# Patient Record
Sex: MALE | Race: WHITE | NOT HISPANIC OR LATINO | Employment: OTHER | URBAN - METROPOLITAN AREA
[De-identification: names, ages, dates, MRNs, and addresses within clinical notes are randomized per-mention and may not be internally consistent; named-entity substitution may affect disease eponyms.]

---

## 2017-08-16 ENCOUNTER — TRANSCRIBE ORDERS (OUTPATIENT)
Dept: ADMINISTRATIVE | Facility: HOSPITAL | Age: 67
End: 2017-08-16

## 2017-08-16 ENCOUNTER — HOSPITAL ENCOUNTER (OUTPATIENT)
Dept: RADIOLOGY | Facility: HOSPITAL | Age: 67
Discharge: HOME | End: 2017-08-16
Payer: MEDICARE

## 2017-08-16 DIAGNOSIS — M54.50 CHRONIC LOW BACK PAIN WITHOUT SCIATICA, UNSPECIFIED BACK PAIN LATERALITY: ICD-10-CM

## 2017-08-16 DIAGNOSIS — G89.29 CHRONIC LOW BACK PAIN WITHOUT SCIATICA, UNSPECIFIED BACK PAIN LATERALITY: Primary | ICD-10-CM

## 2017-08-16 DIAGNOSIS — M54.50 CHRONIC LOW BACK PAIN WITHOUT SCIATICA, UNSPECIFIED BACK PAIN LATERALITY: Primary | ICD-10-CM

## 2017-08-16 DIAGNOSIS — G89.29 CHRONIC LOW BACK PAIN WITHOUT SCIATICA, UNSPECIFIED BACK PAIN LATERALITY: ICD-10-CM

## 2017-08-16 PROCEDURE — 72110 X-RAY EXAM L-2 SPINE 4/>VWS: CPT

## 2018-10-17 ENCOUNTER — HOSPITAL ENCOUNTER (OUTPATIENT)
Facility: HOSPITAL | Age: 68
Setting detail: OBSERVATION
LOS: 1 days | Discharge: HOME | End: 2018-10-19
Attending: EMERGENCY MEDICINE | Admitting: INTERNAL MEDICINE
Payer: MEDICARE

## 2018-10-17 ENCOUNTER — APPOINTMENT (EMERGENCY)
Dept: RADIOLOGY | Facility: HOSPITAL | Age: 68
End: 2018-10-17
Payer: MEDICARE

## 2018-10-17 DIAGNOSIS — I48.92 ATRIAL FLUTTER (HCC): Primary | ICD-10-CM

## 2018-10-17 DIAGNOSIS — I48.3 TYPICAL ATRIAL FLUTTER (HCC): ICD-10-CM

## 2018-10-17 PROBLEM — I16.0 HYPERTENSIVE URGENCY: Status: ACTIVE | Noted: 2018-10-17

## 2018-10-17 LAB
ALBUMIN SERPL BCP-MCNC: 4.5 G/DL (ref 3.5–5)
ALP SERPL-CCNC: 77 U/L (ref 46–116)
ALT SERPL W P-5'-P-CCNC: 36 U/L (ref 12–78)
ANION GAP SERPL CALCULATED.3IONS-SCNC: 13 MMOL/L (ref 4–13)
APTT PPP: 29 SECONDS (ref 24–36)
AST SERPL W P-5'-P-CCNC: 20 U/L (ref 5–45)
BASOPHILS # BLD AUTO: 0.06 THOUSANDS/ΜL (ref 0–0.1)
BASOPHILS NFR BLD AUTO: 1 % (ref 0–1)
BILIRUB SERPL-MCNC: 0.6 MG/DL (ref 0.2–1)
BUN SERPL-MCNC: 20 MG/DL (ref 5–25)
CALCIUM SERPL-MCNC: 9.6 MG/DL (ref 8.3–10.1)
CHLORIDE SERPL-SCNC: 103 MMOL/L (ref 100–108)
CO2 SERPL-SCNC: 25 MMOL/L (ref 21–32)
CREAT SERPL-MCNC: 1.06 MG/DL (ref 0.6–1.3)
EOSINOPHIL # BLD AUTO: 0.28 THOUSAND/ΜL (ref 0–0.61)
EOSINOPHIL NFR BLD AUTO: 4 % (ref 0–6)
ERYTHROCYTE [DISTWIDTH] IN BLOOD BY AUTOMATED COUNT: 12.1 % (ref 11.6–15.1)
GFR SERPL CREATININE-BSD FRML MDRD: 72 ML/MIN/1.73SQ M
GLUCOSE SERPL-MCNC: 110 MG/DL (ref 65–140)
HCT VFR BLD AUTO: 51.6 % (ref 36.5–49.3)
HGB BLD-MCNC: 17.7 G/DL (ref 12–17)
IMM GRANULOCYTES # BLD AUTO: 0.02 THOUSAND/UL (ref 0–0.2)
IMM GRANULOCYTES NFR BLD AUTO: 0 % (ref 0–2)
INR PPP: 1.02 (ref 0.86–1.17)
LYMPHOCYTES # BLD AUTO: 2.74 THOUSANDS/ΜL (ref 0.6–4.47)
LYMPHOCYTES NFR BLD AUTO: 36 % (ref 14–44)
MAGNESIUM SERPL-MCNC: 2.2 MG/DL (ref 1.6–2.6)
MCH RBC QN AUTO: 30.7 PG (ref 26.8–34.3)
MCHC RBC AUTO-ENTMCNC: 34.3 G/DL (ref 31.4–37.4)
MCV RBC AUTO: 90 FL (ref 82–98)
MONOCYTES # BLD AUTO: 0.66 THOUSAND/ΜL (ref 0.17–1.22)
MONOCYTES NFR BLD AUTO: 9 % (ref 4–12)
NEUTROPHILS # BLD AUTO: 3.88 THOUSANDS/ΜL (ref 1.85–7.62)
NEUTS SEG NFR BLD AUTO: 50 % (ref 43–75)
NRBC BLD AUTO-RTO: 0 /100 WBCS
NT-PROBNP SERPL-MCNC: 319 PG/ML
PLATELET # BLD AUTO: 196 THOUSANDS/UL (ref 149–390)
PLATELET # BLD AUTO: 209 THOUSANDS/UL (ref 149–390)
PMV BLD AUTO: 9.1 FL (ref 8.9–12.7)
PMV BLD AUTO: 9.3 FL (ref 8.9–12.7)
POTASSIUM SERPL-SCNC: 4 MMOL/L (ref 3.5–5.3)
PROT SERPL-MCNC: 7.7 G/DL (ref 6.4–8.2)
PROTHROMBIN TIME: 13.1 SECONDS (ref 11.8–14.2)
RBC # BLD AUTO: 5.76 MILLION/UL (ref 3.88–5.62)
SODIUM SERPL-SCNC: 141 MMOL/L (ref 136–145)
TROPONIN I SERPL-MCNC: <0.02 NG/ML
TROPONIN I SERPL-MCNC: <0.02 NG/ML
TSH SERPL DL<=0.05 MIU/L-ACNC: 3.04 UIU/ML (ref 0.36–3.74)
WBC # BLD AUTO: 7.64 THOUSAND/UL (ref 4.31–10.16)

## 2018-10-17 PROCEDURE — 85049 AUTOMATED PLATELET COUNT: CPT | Performed by: PHYSICIAN ASSISTANT

## 2018-10-17 PROCEDURE — 85610 PROTHROMBIN TIME: CPT | Performed by: EMERGENCY MEDICINE

## 2018-10-17 PROCEDURE — 36415 COLL VENOUS BLD VENIPUNCTURE: CPT | Performed by: EMERGENCY MEDICINE

## 2018-10-17 PROCEDURE — 85025 COMPLETE CBC W/AUTO DIFF WBC: CPT | Performed by: EMERGENCY MEDICINE

## 2018-10-17 PROCEDURE — 93005 ELECTROCARDIOGRAM TRACING: CPT

## 2018-10-17 PROCEDURE — 80053 COMPREHEN METABOLIC PANEL: CPT | Performed by: EMERGENCY MEDICINE

## 2018-10-17 PROCEDURE — 71045 X-RAY EXAM CHEST 1 VIEW: CPT

## 2018-10-17 PROCEDURE — 83735 ASSAY OF MAGNESIUM: CPT | Performed by: EMERGENCY MEDICINE

## 2018-10-17 PROCEDURE — 99285 EMERGENCY DEPT VISIT HI MDM: CPT

## 2018-10-17 PROCEDURE — 83880 ASSAY OF NATRIURETIC PEPTIDE: CPT | Performed by: EMERGENCY MEDICINE

## 2018-10-17 PROCEDURE — 84443 ASSAY THYROID STIM HORMONE: CPT | Performed by: PHYSICIAN ASSISTANT

## 2018-10-17 PROCEDURE — 96374 THER/PROPH/DIAG INJ IV PUSH: CPT

## 2018-10-17 PROCEDURE — 85730 THROMBOPLASTIN TIME PARTIAL: CPT | Performed by: EMERGENCY MEDICINE

## 2018-10-17 PROCEDURE — 84484 ASSAY OF TROPONIN QUANT: CPT | Performed by: PHYSICIAN ASSISTANT

## 2018-10-17 PROCEDURE — 84484 ASSAY OF TROPONIN QUANT: CPT | Performed by: EMERGENCY MEDICINE

## 2018-10-17 PROCEDURE — 99220 PR INITIAL OBSERVATION CARE/DAY 70 MINUTES: CPT | Performed by: PHYSICIAN ASSISTANT

## 2018-10-17 RX ORDER — DILTIAZEM HYDROCHLORIDE 5 MG/ML
15 INJECTION INTRAVENOUS EVERY 6 HOURS PRN
Status: DISCONTINUED | OUTPATIENT
Start: 2018-10-17 | End: 2018-10-17

## 2018-10-17 RX ORDER — DILTIAZEM HYDROCHLORIDE 5 MG/ML
20 INJECTION INTRAVENOUS ONCE
Status: COMPLETED | OUTPATIENT
Start: 2018-10-17 | End: 2018-10-17

## 2018-10-17 RX ORDER — ECHINACEA 400 MG
1300 CAPSULE ORAL 2 TIMES DAILY
COMMUNITY
End: 2020-01-16 | Stop reason: HOSPADM

## 2018-10-17 RX ORDER — ONDANSETRON 2 MG/ML
4 INJECTION INTRAMUSCULAR; INTRAVENOUS EVERY 6 HOURS PRN
Status: DISCONTINUED | OUTPATIENT
Start: 2018-10-17 | End: 2018-10-19 | Stop reason: HOSPADM

## 2018-10-17 RX ORDER — ACETAMINOPHEN 325 MG/1
650 TABLET ORAL EVERY 6 HOURS PRN
Status: DISCONTINUED | OUTPATIENT
Start: 2018-10-17 | End: 2018-10-19 | Stop reason: HOSPADM

## 2018-10-17 RX ORDER — ASPIRIN 81 MG/1
162 TABLET, CHEWABLE ORAL DAILY
Status: DISCONTINUED | OUTPATIENT
Start: 2018-10-18 | End: 2018-10-18

## 2018-10-17 RX ORDER — TADALAFIL 20 MG/1
20 TABLET ORAL EVERY 24 HOURS
COMMUNITY
End: 2018-10-19 | Stop reason: HOSPADM

## 2018-10-17 RX ADMIN — DILTIAZEM HYDROCHLORIDE 20 MG: 5 INJECTION INTRAVENOUS at 18:40

## 2018-10-17 RX ADMIN — METOPROLOL TARTRATE 25 MG: 25 TABLET, FILM COATED ORAL at 20:53

## 2018-10-17 RX ADMIN — DILTIAZEM HYDROCHLORIDE 15 MG/HR: 5 INJECTION INTRAVENOUS at 22:30

## 2018-10-17 RX ADMIN — DILTIAZEM HYDROCHLORIDE 15 MG: 5 INJECTION INTRAVENOUS at 20:52

## 2018-10-17 NOTE — ED PROVIDER NOTES
History  Chief Complaint   Patient presents with    Abnormal ECG     pt sent by doctor for abnormal EKG- Pt current , /114  Pt denies any signs and symptoms  EKG given by pt shows 5-6 beat run of Vtach     61year-old gentleman comes in for abnormal EKG  Patient was going for his annual checkup at his [de-identified] office he had no complaints they were doing a routine EKG when they found his heart rate to be in the 150s to 160s  And he was also hypertension at 190/114  Patient was brought in via EMS patient denies any chest pain shortness of breath nausea vomiting fever chills he states that he does not feel like his heart is racing and has never experienced any palpitations  History provided by:  Patient   used: No    Rapid Heart Rate   Palpitations quality:  Fast  Onset quality:  Sudden  Timing:  Constant  Progression:  Unchanged  Chronicity:  New  Context: not anxiety, not dehydration and not stimulant use    Ineffective treatments:  None tried  Associated symptoms: no back pain, no chest pain, no chest pressure, no cough, no dizziness, no lower extremity edema, no nausea, no syncope and no weakness    Risk factors: no diabetes mellitus, no hx of thyroid disease and no OTC sinus medications        Prior to Admission Medications   Prescriptions Last Dose Informant Patient Reported? Taking? Flaxseed, Linseed, (FLAXSEED OIL) 1000 MG CAPS   Yes No   Sig: Take 1,300 mg by mouth 2 (two) times a day   Testosterone Cypionate 200 MG/ML KIT   Yes Yes   Sig: Inject 200 mg/mL into a muscle every 30 (thirty) days   aspirin 81 MG tablet   Yes No   Sig: Take 81 mg by mouth 2 (two) times a day   tadalafil (CIALIS) 20 MG tablet   Yes No   Si mg every 24 hours      Facility-Administered Medications: None       History reviewed  No pertinent past medical history  History reviewed  No pertinent surgical history  History reviewed  No pertinent family history    I have reviewed and agree with the history as documented  Social History   Substance Use Topics    Smoking status: Former Smoker    Smokeless tobacco: Never Used    Alcohol use No        Review of Systems   Constitutional: Negative for activity change and appetite change  HENT: Negative for congestion and facial swelling  Eyes: Negative for discharge and redness  Respiratory: Negative for cough and wheezing  Cardiovascular: Positive for palpitations  Negative for chest pain, leg swelling and syncope  Gastrointestinal: Negative for abdominal distention, abdominal pain, blood in stool and nausea  Endocrine: Negative for cold intolerance and polydipsia  Genitourinary: Negative for difficulty urinating and hematuria  Musculoskeletal: Negative for arthralgias, back pain and gait problem  Skin: Negative for color change and rash  Allergic/Immunologic: Negative for food allergies and immunocompromised state  Neurological: Negative for dizziness, seizures, weakness and headaches  Hematological: Negative for adenopathy  Does not bruise/bleed easily  Psychiatric/Behavioral: Negative for agitation, confusion and decreased concentration  All other systems reviewed and are negative  Physical Exam  Physical Exam   Constitutional: He is oriented to person, place, and time  He appears well-developed and well-nourished  Non-toxic appearance  HENT:   Head: Normocephalic and atraumatic  Right Ear: Tympanic membrane normal    Left Ear: Tympanic membrane normal    Nose: Nose normal    Mouth/Throat: Oropharynx is clear and moist    Eyes: Pupils are equal, round, and reactive to light  Conjunctivae, EOM and lids are normal    Neck: Trachea normal and normal range of motion  Neck supple  No JVD present  Carotid bruit is not present  Cardiovascular: Normal heart sounds and intact distal pulses  An irregularly irregular rhythm present  No extrasystoles are present  Tachycardia present      Pulmonary/Chest: Effort normal  He has no decreased breath sounds  He has no wheezes  He has no rhonchi  He has no rales  He exhibits no tenderness and no deformity  Abdominal: Soft  Bowel sounds are normal  There is no tenderness  There is no rebound, no guarding and no CVA tenderness  Musculoskeletal:        Right shoulder: He exhibits normal range of motion, no tenderness, no swelling and no deformity  Cervical back: Normal  He exhibits normal range of motion, no tenderness, no bony tenderness and no deformity  Lymphadenopathy:     He has no cervical adenopathy  He has no axillary adenopathy  Neurological: He is alert and oriented to person, place, and time  He has normal strength and normal reflexes  No cranial nerve deficit or sensory deficit  He displays a negative Romberg sign  Skin: Skin is warm and dry  Psychiatric: He has a normal mood and affect  His speech is normal and behavior is normal  Judgment and thought content normal  Cognition and memory are normal    Nursing note and vitals reviewed        Vital Signs  ED Triage Vitals   Temperature Pulse Respirations Blood Pressure SpO2   10/17/18 1827 10/17/18 1827 10/17/18 1827 10/17/18 1827 10/17/18 1827   99 °F (37 2 °C) (!) 150 18 (!) 190/114 99 %      Temp Source Heart Rate Source Patient Position - Orthostatic VS BP Location FiO2 (%)   10/17/18 2050 10/17/18 1827 10/17/18 1827 10/17/18 1827 --   Oral Monitor Sitting Right arm       Pain Score       10/17/18 1827       No Pain           Vitals:    10/17/18 1845 10/17/18 2017 10/17/18 2050 10/17/18 2215   BP: (!) 178/90 (!) 163/112 (!) 195/83 158/82   Pulse: 101 (!) 120 (!) 122 102   Patient Position - Orthostatic VS:  Lying Sitting Sitting       Visual Acuity      ED Medications  Medications   ondansetron (ZOFRAN) injection 4 mg (not administered)   enoxaparin (LOVENOX) subcutaneous injection 40 mg (not administered)   acetaminophen (TYLENOL) tablet 650 mg (not administered)   metoprolol tartrate (LOPRESSOR) tablet 25 mg (25 mg Oral Given 10/17/18 2053)   aspirin chewable tablet 162 mg (not administered)   diltiazem (CARDIZEM) 125 mg in sodium chloride 0 9 % 125 mL infusion (15 mg/hr Intravenous New Bag 10/17/18 2230)   diltiazem (CARDIZEM) injection 20 mg (20 mg Intravenous Given 10/17/18 1840)       Diagnostic Studies  Results Reviewed     Procedure Component Value Units Date/Time    TSH, 3rd generation [[id number]]  (Normal) Collected:  10/17/18 1842    Lab Status:  Final result Specimen:  Blood from Arm, Right Updated:  10/17/18 2115     TSH 3RD GENERATON 3 037 uIU/mL     Narrative:         Patients undergoing fluorescein dye angiography may retain small amounts of fluorescein in the body for 48-72 hours post procedure  Samples containing fluorescein can produce falsely depressed TSH values  If the patient had this procedure,a specimen should be resubmitted post fluorescein clearance      Magnesium [[id number]]  (Normal) Collected:  10/17/18 1842    Lab Status:  Final result Specimen:  Blood from Arm, Right Updated:  10/17/18 1929     Magnesium 2 2 mg/dL     B-type natriuretic peptide [[id number]]  (Abnormal) Collected:  10/17/18 1842    Lab Status:  Final result Specimen:  Blood from Arm, Right Updated:  10/17/18 1929     NT-proBNP 319 (H) pg/mL     Comprehensive metabolic panel [[id number]] Collected:  10/17/18 1842    Lab Status:  Final result Specimen:  Blood from Arm, Right Updated:  10/17/18 1922     Sodium 141 mmol/L      Potassium 4 0 mmol/L      Chloride 103 mmol/L      CO2 25 mmol/L      ANION GAP 13 mmol/L      BUN 20 mg/dL      Creatinine 1 06 mg/dL      Glucose 110 mg/dL      Calcium 9 6 mg/dL      AST 20 U/L      ALT 36 U/L      Alkaline Phosphatase 77 U/L      Total Protein 7 7 g/dL      Albumin 4 5 g/dL      Total Bilirubin 0 60 mg/dL      eGFR 72 ml/min/1 73sq m     Narrative:         National Kidney Disease Education Program recommendations are as follows:  GFR calculation is accurate only with a steady state creatinine  Chronic Kidney disease less than 60 ml/min/1 73 sq  meters  Kidney failure less than 15 ml/min/1 73 sq  meters      Troponin I [[id number]]  (Normal) Collected:  10/17/18 1842    Lab Status:  Final result Specimen:  Blood from Arm, Right Updated:  10/17/18 1912     Troponin I <0 02 ng/mL     Protime-INR [[id number]]  (Normal) Collected:  10/17/18 1842    Lab Status:  Final result Specimen:  Blood from Arm, Right Updated:  10/17/18 1905     Protime 13 1 seconds      INR 1 02    APTT [[id number]]  (Normal) Collected:  10/17/18 1842    Lab Status:  Final result Specimen:  Blood from Arm, Right Updated:  10/17/18 1905     PTT 29 seconds     CBC and differential [[id number]]  (Abnormal) Collected:  10/17/18 1842    Lab Status:  Final result Specimen:  Blood from Arm, Right Updated:  10/17/18 1848     WBC 7 64 Thousand/uL      RBC 5 76 (H) Million/uL      Hemoglobin 17 7 (H) g/dL      Hematocrit 51 6 (H) %      MCV 90 fL      MCH 30 7 pg      MCHC 34 3 g/dL      RDW 12 1 %      MPV 9 1 fL      Platelets 248 Thousands/uL      nRBC 0 /100 WBCs      Neutrophils Relative 50 %      Immat GRANS % 0 %      Lymphocytes Relative 36 %      Monocytes Relative 9 %      Eosinophils Relative 4 %      Basophils Relative 1 %      Neutrophils Absolute 3 88 Thousands/µL      Immature Grans Absolute 0 02 Thousand/uL      Lymphocytes Absolute 2 74 Thousands/µL      Monocytes Absolute 0 66 Thousand/µL      Eosinophils Absolute 0 28 Thousand/µL      Basophils Absolute 0 06 Thousands/µL                  XR chest 1 view portable   ED Interpretation by Julien Mccrary DO (10/17 1931)                 Procedures  ECG 12 Lead Documentation  Date/Time: 10/17/2018 6:34 PM  Performed by: Ada Alarcon by: Dimitris VU     Patient location:  ED  Rate:     ECG rate:  124  Rhythm:     Rhythm: atrial flutter      ECG 12 Lead Documentation  Date/Time: 10/17/2018 11:27 PM  Performed by: Ada Alarcon by: DIANN QUIROZ     Rate:     ECG rate:  100  Rhythm:     Rhythm: atrial flutter             Phone Contacts  ED Phone Contact    ED Course                               MDM  Number of Diagnoses or Management Options  Atrial flutter ([facility] ): new and requires workup     Amount and/or Complexity of Data Reviewed  Clinical lab tests: ordered and reviewed  Tests in the radiology section of CPT®: ordered and reviewed  Tests in the medicine section of CPT®: ordered and reviewed  Review and summarize past medical records: yes  Discuss the patient with other providers: yes  Independent visualization of images, tracings, or specimens: yes    Risk of Complications, Morbidity, and/or Mortality  General comments: Patient remains in the asymptomatic discussed diagnosis with him he understands the need for admission to be seen cardiology  Patient Progress  Patient progress: stable    CritCare Time    Disposition  Final diagnoses:   Atrial flutter ([facility] )     Time reflects when diagnosis was documented in both MDM as applicable and the Disposition within this note     Time User Action Codes Description Comment    10/17/2018  7:40 PM Aristeo VU Add [I48 92] Atrial flutter ([facility] )     10/17/2018  8:15 PM Candelaria Eleonora Add [I48 3] Typical atrial flutter ([facility] )     10/17/2018  8:15 PM Candelaria Crews Modify [I48 3] Typical atrial flutter [facility])       ED Disposition     ED Disposition Condition Comment    Admit  Case was discussed with and the patient's admission status was agreed to be Admission Status: inpatient status to the service of Dr Mychal Tai           Follow-up Information    None         Current Discharge Medication List      CONTINUE these medications which have NOT CHANGED    Details   Testosterone Cypionate 200 MG/ML KIT Inject 200 mg/mL into a muscle every 30 (thirty) days      aspirin 81 MG tablet Take 81 mg by mouth 2 (two) times a day      Flaxseed, Linseed, (FLAXSEED OIL) 1000 MG CAPS Take 1,300 mg by mouth 2 (two) times a day      tadalafil (CIALIS) 20 MG tablet 20 mg every 24 hours           No discharge procedures on file      ED Provider  Electronically Signed by           Arely Hagen DO  10/17/18 5276

## 2018-10-18 ENCOUNTER — APPOINTMENT (OUTPATIENT)
Dept: NON INVASIVE DIAGNOSTICS | Facility: HOSPITAL | Age: 68
End: 2018-10-18
Payer: MEDICARE

## 2018-10-18 LAB
ANION GAP SERPL CALCULATED.3IONS-SCNC: 12 MMOL/L (ref 4–13)
ATRIAL RATE: 147 BPM
ATRIAL RATE: 312 BPM
BUN SERPL-MCNC: 17 MG/DL (ref 5–25)
CALCIUM SERPL-MCNC: 8.9 MG/DL (ref 8.3–10.1)
CHLORIDE SERPL-SCNC: 104 MMOL/L (ref 100–108)
CO2 SERPL-SCNC: 24 MMOL/L (ref 21–32)
CREAT SERPL-MCNC: 1.1 MG/DL (ref 0.6–1.3)
GFR SERPL CREATININE-BSD FRML MDRD: 69 ML/MIN/1.73SQ M
GLUCOSE P FAST SERPL-MCNC: 117 MG/DL (ref 65–99)
GLUCOSE SERPL-MCNC: 117 MG/DL (ref 65–140)
MAGNESIUM SERPL-MCNC: 2.1 MG/DL (ref 1.6–2.6)
P AXIS: -79 DEGREES
P AXIS: 255 DEGREES
POTASSIUM SERPL-SCNC: 4.2 MMOL/L (ref 3.5–5.3)
QRS AXIS: 82 DEGREES
QRS AXIS: 83 DEGREES
QRSD INTERVAL: 144 MS
QRSD INTERVAL: 146 MS
QT INTERVAL: 316 MS
QT INTERVAL: 370 MS
QTC INTERVAL: 453 MS
QTC INTERVAL: 477 MS
SODIUM SERPL-SCNC: 140 MMOL/L (ref 136–145)
T WAVE AXIS: 0 DEGREES
T WAVE AXIS: 21 DEGREES
TROPONIN I SERPL-MCNC: <0.02 NG/ML
VENTRICULAR RATE: 100 BPM
VENTRICULAR RATE: 124 BPM

## 2018-10-18 PROCEDURE — 80048 BASIC METABOLIC PNL TOTAL CA: CPT | Performed by: PHYSICIAN ASSISTANT

## 2018-10-18 PROCEDURE — 99214 OFFICE O/P EST MOD 30 MIN: CPT | Performed by: INTERNAL MEDICINE

## 2018-10-18 PROCEDURE — 83735 ASSAY OF MAGNESIUM: CPT | Performed by: PHYSICIAN ASSISTANT

## 2018-10-18 PROCEDURE — 93306 TTE W/DOPPLER COMPLETE: CPT

## 2018-10-18 PROCEDURE — 93010 ELECTROCARDIOGRAM REPORT: CPT | Performed by: INTERNAL MEDICINE

## 2018-10-18 PROCEDURE — 99225 PR SBSQ OBSERVATION CARE/DAY 25 MINUTES: CPT | Performed by: INTERNAL MEDICINE

## 2018-10-18 PROCEDURE — 84484 ASSAY OF TROPONIN QUANT: CPT | Performed by: PHYSICIAN ASSISTANT

## 2018-10-18 PROCEDURE — 93306 TTE W/DOPPLER COMPLETE: CPT | Performed by: INTERNAL MEDICINE

## 2018-10-18 RX ORDER — DILTIAZEM HYDROCHLORIDE 120 MG/1
120 CAPSULE, COATED, EXTENDED RELEASE ORAL DAILY
Status: DISCONTINUED | OUTPATIENT
Start: 2018-10-18 | End: 2018-10-19

## 2018-10-18 RX ORDER — METOPROLOL SUCCINATE 50 MG/1
50 TABLET, EXTENDED RELEASE ORAL DAILY
Status: DISCONTINUED | OUTPATIENT
Start: 2018-10-19 | End: 2018-10-19

## 2018-10-18 RX ORDER — DILTIAZEM HYDROCHLORIDE 5 MG/ML
15 INJECTION INTRAVENOUS EVERY 6 HOURS PRN
Status: DISCONTINUED | OUTPATIENT
Start: 2018-10-18 | End: 2018-10-19 | Stop reason: HOSPADM

## 2018-10-18 RX ORDER — METOPROLOL TARTRATE 50 MG/1
50 TABLET, FILM COATED ORAL EVERY 12 HOURS SCHEDULED
Status: DISCONTINUED | OUTPATIENT
Start: 2018-10-18 | End: 2018-10-18

## 2018-10-18 RX ORDER — METOPROLOL SUCCINATE 25 MG/1
25 TABLET, EXTENDED RELEASE ORAL ONCE
Status: COMPLETED | OUTPATIENT
Start: 2018-10-18 | End: 2018-10-18

## 2018-10-18 RX ADMIN — APIXABAN 5 MG: 5 TABLET, FILM COATED ORAL at 18:27

## 2018-10-18 RX ADMIN — DILTIAZEM HYDROCHLORIDE 15 MG: 5 INJECTION INTRAVENOUS at 18:27

## 2018-10-18 RX ADMIN — ENOXAPARIN SODIUM 40 MG: 40 INJECTION SUBCUTANEOUS at 09:54

## 2018-10-18 RX ADMIN — ASPIRIN 81 MG 162 MG: 81 TABLET ORAL at 08:00

## 2018-10-18 RX ADMIN — METOPROLOL TARTRATE 25 MG: 25 TABLET, FILM COATED ORAL at 08:00

## 2018-10-18 RX ADMIN — METOPROLOL TARTRATE 25 MG: 25 TABLET ORAL at 09:54

## 2018-10-18 RX ADMIN — METOPROLOL SUCCINATE 25 MG: 25 TABLET, EXTENDED RELEASE ORAL at 11:33

## 2018-10-18 RX ADMIN — DILTIAZEM HYDROCHLORIDE 120 MG: 120 CAPSULE, COATED, EXTENDED RELEASE ORAL at 15:24

## 2018-10-18 NOTE — PROGRESS NOTES
[address] Internal Medicine Progress Note  Patient: Rashard Crockett 76 y o  male   MRN: [id number]  PCP: Jose Ramon Parker  Unit/Bed#: -01 Encounter: [id number]  Date Of Visit: 10/18/18    Assessment:    Principal Problem:    Typical atrial flutter ([address] )  Active Problems:    Hypertensive urgency      Plan:    · Typical Atrial Flutter -   · Rate / Rhythm Control -   · Cardizem drip - wean as tolerated  · Increase metoprolol to 50 mg bid  · Evaluation by cardiology for possible cardioversion  · Anticoagulation - to be determined based on cardiology plan of care  · Troponins negative, TSH negative  · Echocardiogram   · Cardiology consultation - determination for medical rate control vs  Cardioversion vs  ablation  · Elevated Blood Pressure - doing better this morning, but has been on cardizem drip overnight  Monitor blood pressures  VTE Pharmacologic Prophylaxis:   Pharmacologic: Enoxaparin (Lovenox)  Mechanical VTE Prophylaxis in Place: Yes    Patient Centered Rounds: I have performed bedside rounds with nursing staff today  Discussions with Specialists or Other Care Team Provider: Janice Gonzalez with cardiology team     Education and Discussions with Family / Patient: Patient only  Time Spent for Care: 30 minutes  More than 50% of total time spent on counseling and coordination of care as described above  Current Length of Stay: 1 day(s)    Current Patient Status: Observation   Certification Statement: currently observation and dependent on response to treatment and plan by cardiology may either be discharged today or possibly will require another 1-2 days in hospital     Discharge Plan / Estimated Discharge Date: To be determined  Code Status: Level 1 - Full Code      Subjective: The patient feels fine currently  He has no shortness of breath, palpitations, dizziness or chest pain    The patient was asymptomatic even when he was diagnosed with the atrial flutter in his primary care physician's office  The patient has no nausea or abdominal symptoms  Objective:     Vitals:   Temp (24hrs), Av 8 °F (37 1 °C), Min:98 7 °F (37 1 °C), Max:99 °F (37 2 °C)    Temp:  [98 7 °F (37 1 °C)-99 °F (37 2 °C)] 98 7 °F (37 1 °C)  HR:  [101-150] 103  Resp:  [16-20] 18  BP: (125-195)/() 125/83  SpO2:  [96 %-99 %] 96 %  Body mass index is 27 39 kg/m²  Input and Output Summary (last 24 hours):     No intake or output data in the 24 hours ending 10/18/18 0817    Physical Exam:     Physical Exam   Constitutional: He is oriented to person, place, and time  No distress  HENT:   Mouth/Throat: Oropharynx is clear and moist  No oropharyngeal exudate  Eyes: Pupils are equal, round, and reactive to light  Cardiovascular: Regular rhythm and intact distal pulses  No murmur heard  Mildly tachycardic rate   Pulmonary/Chest: Effort normal and breath sounds normal  He has no wheezes  He has no rales  Abdominal: Soft  Bowel sounds are normal  He exhibits no distension  There is no tenderness  Musculoskeletal: He exhibits no edema or tenderness  Neurological: He is alert and oriented to person, place, and time  Skin: No rash noted  Vitals reviewed  Additional Data:     Labs:      Results from last 7 days  Lab Units 10/17/18  2147 10/17/18  1842   WBC Thousand/uL  --  7 64   HEMOGLOBIN g/dL  --  17 7*   HEMATOCRIT %  --  51 6*   PLATELETS Thousands/uL 196 209   NEUTROS PCT %  --  50   LYMPHS PCT %  --  36   MONOS PCT %  --  9   EOS PCT %  --  4       Results from last 7 days  Lab Units 10/18/18  0520 10/17/18  1842   SODIUM mmol/L 140 141   POTASSIUM mmol/L 4 2 4 0   CHLORIDE mmol/L 104 103   CO2 mmol/L 24 25   BUN mg/dL 17 20   CREATININE mg/dL 1 10 1 06   CALCIUM mg/dL 8 9 9 6   ALK PHOS U/L  --  77   ALT U/L  --  36   AST U/L  --  20       Results from last 7 days  Lab Units 10/17/18  1842   INR  1 02       * I Have Reviewed All Lab Data Listed Above    * Additional Pertinent Lab Tests Reviewed: [address] Admission Reviewed    Imaging:    Imaging Reports Reviewed Today Include: chest xray  Imaging Personally Reviewed by Myself Includes:  None    Recent Cultures (last 7 days):           Last 24 Hours Medication List:     Current Facility-Administered Medications:  acetaminophen 650 mg Oral Q6H PRN Spencer Akins PA-C    aspirin 162 mg Oral Daily Spencer Alas PA-C    diltiazem 1-15 mg/hr Intravenous Titrated Kristine Weber PA-C Last Rate: 2 5 mg/hr (10/18/18 0100)   enoxaparin 40 mg Subcutaneous Daily Spencer Alas PA-C    metoprolol tartrate 25 mg Oral Q12H [address] Spencer Alas PA-C    ondansetron 4 mg Intravenous Q6H PRN Darcy Sotelo PA-C         Today, Patient Was Seen By: Demetrius Jackson DO    ** Please Note: This note has been constructed using a voice recognition system   **

## 2018-10-18 NOTE — UTILIZATION REVIEW
Thank you,  Preeti Sandsn  Utilization Review Department  Phone: [phone]; Fax [phone]  ATTENTION: Please call with any questions or concerns to [phone]  and carefully follow the prompts so that you are directed to the right person  Send all requests for admission clinical reviews, approved or denied determinations and any other requests to fax [phone]  All voicemails are confidential    Initial Clinical Review    Admission: Date/Time/Statement: OBSERVATION ADMISSION 10/17/18 1959    Orders Placed This Encounter   Procedures    Place in Observation (expected length of stay for this patient is less than two midnights)     Standing Status:   Standing     Number of Occurrences:   1     Order Specific Question:   Admitting Physician     Answer:   [place] Other     Order Specific Question:   Level of Care     Answer:   Med Surg [16]     ED: Date/Time/Mode of Arrival:   ED Arrival Information     Expected Arrival Acuity Means of Arrival Escorted By Service Admission Type    - 10/17/2018 18:15 Emergent Walk-In Self General Medicine Emergency    Arrival Complaint    ABNORMAL EKG          Chief Complaint:   Chief Complaint   Patient presents with    Abnormal ECG     pt sent by doctor for abnormal EKG- Pt current , /114  Pt denies any signs and symptoms  EKG given by pt shows 5-6 beat run of Vtach       History of Illness: 70-year-old gentleman with no significant medical history comes in for abnormal EKG  Patient was going for his annual checkup at his [de-identified] office he had no complaints they were doing a routine EKG when they found his heart rate to be in the 150s to 160s  And he was also hypertension at 190/114  Patient was brought in via EMS      ED Vital Signs:   ED Triage Vitals   Temperature Pulse Respirations Blood Pressure SpO2   10/17/18 1827 10/17/18 1827 10/17/18 1827 10/17/18 1827 10/17/18 1827   99 °F (37 2 °C) (!) 150 18 (!) 190/114 99 % Temp Source Heart Rate Source Patient Position - Orthostatic VS BP Location FiO2 (%)   10/17/18 2050 10/17/18 1827 10/17/18 1827 10/17/18 1827 --   Oral Monitor Sitting Right arm       Pain Score       10/17/18 1827       No Pain        Wt Readings from Last 1 Encounters:   10/17/18 81 7 kg (180 lb 1 9 oz)       Vital Signs (abnormal):   /18 2050  122    195/83    10/17/18 2017  120    163/112    10/17/18 1845 101    178/90    10/17/18 1827  150    190/114      Abnormal Labs/Diagnostic Test Results: 10/17/2018 NT-proBNP 319,   RBC 3 88 - 5 62 Million/uL 5 76     Hemoglobin 12 0 - 17 0 g/dL 17 7     Hematocrit 36 5 - 49 3 % 51 6     EKG: ECG rate:  100  Rhythm:     Rhythm: atrial flutter      10/18 glucose 117      ED Treatment:   Medication Administration from 10/17/2018 1815 to 10/17/2018 2027       Date/Time Order Dose Route Action Action by Comments     10/17/2018 1840 diltiazem (CARDIZEM) injection 20 mg 20 mg Intravenous Given Jacklyn Kidd RN           Past Medical/Surgical History: Active Ambulatory Problems     Diagnosis Date Noted    No Active Ambulatory Problems       No Additional Past Medical History       Admitting Diagnosis: Atrial flutter ([address] ) [I48 92]  Abnormal EKG [R94 31]  Typical atrial flutter ([facility]) [I48 3]    Age/Sex: 76 y o  male    Assessment/Plan: 76 y o  Male asymptomatic  found to be in atrial flutter  During well visit  [id number] = 1  Admit under telemetry, check TSH, troponin, ECHO  Make NPO for candidate for CAM with conversion  Start Metoprolol, Cardizem IV prn      Admission Orders:  Scheduled Meds:   Current Facility-Administered Medications:  acetaminophen 650 mg Oral Q6H PRN    aspirin 162 mg Oral Daily    diltiazem 1-15 mg/hr Intravenous Titrated Last Rate: 2 5 mg/hr (10/18/18 0100)   enoxaparin 40 mg Subcutaneous Daily    metoprolol tartrate 25 mg Oral Q12H CROW    ondansetron 4 mg Intravenous Q6H PRN      Continuous Infusions:   diltiazem 1-15 mg/hr Last Rate: 2 5 mg/hr (10/18/18 0100)     PRN Meds:   Peripheral IV  48 hr telemetry  Vitals routine  NPO

## 2018-10-18 NOTE — PROGRESS NOTES
Rates more elevated since stopping diltiazem drip, despite increased Toprol administration this a m  Heart rate currently 120s  Recommend starting diltiazem  mg daily

## 2018-10-18 NOTE — PLAN OF CARE
CARDIOVASCULAR - ADULT     Maintains optimal cardiac output and hemodynamic stability Progressing     Absence of cardiac dysrhythmias or at baseline rhythm Progressing        Knowledge Deficit     Patient/family/caregiver demonstrates understanding of disease process, treatment plan, medications, and discharge instructions Progressing        SAFETY ADULT     Maintain or return to baseline ADL function Progressing

## 2018-10-18 NOTE — SOCIAL WORK
LOS 1   Not a bundle; Not a readmission  Cm received a script for Eliquis in order to find cost through insurance  CM sent script to [facility] in order to run script and provide first 30 days free  CM will continue to follow up with pharmacy in order to assist with DCP

## 2018-10-18 NOTE — H&P
[address] Internal [facility]  H&P- Chitra Burnham 1950, 76 y o  male MRN: [id number]    Unit/Bed#: -01 Encounter: [id number]    Primary Care Provider: Ana Laura Romano   Date and time admitted to hospital: 10/17/2018  6:24 PM        * Typical atrial flutter [facility])   Assessment & Plan    · POA, patient was asymptomatic and was found incidentally at his well visit today  No personal or family history of cardiac history  He had full cardiac work up 10-12 years ago due to a syncopal episode which was negative  This was thought to be from a vaso-vagal event  Currently the patient is stable in the 120s on the monitor  [id number] = 1   · Admit patient to med/surg under observation status with telemetry monitoring   · Consult cardiology   · Check TSH  · Trend troponin   · Metoprolol 25 mg PO Q12  · Cardizem 10 mg IV Q6 PRN tachycardia   · Check Echo  · Will make NPO as patient could be candidate for CAM with cardioversion      Hypertensive urgency   Assessment & Plan    · POA, BP slightly improved but still 130s/100s on exam  · Start Metoprolol 25 mg PO BID   · Cardizem IV PRN        VTE Prophylaxis: Enoxaparin (Lovenox)  / reason for no mechanical VTE prophylaxis None needed as per protocol    Code Status: Full Code   POLST: POLST form is not discussed and not completed at this time  Discussion with family: Discussed with wife at bedside     Anticipated Length of Stay:  Patient will be admitted on an Observation basis with an anticipated length of stay of  Less than 2 midnights  Justification for Hospital Stay: A  Flutter needing further cardiac work up     Total Time for Visit, including Counseling / Coordination of Care: 1 hour  Greater than 50% of this total time spent on direct patient counseling and coordination of care  Chief Complaint:   "I was sent in by my doctor"    History of Present Illness:    Chitra Burnham is a 76 y o  male with no significant medical history who presents with an abnormal EKG  The patient reports that he was at a routine well visit today with his primary care doctor  He states that on exam his physician noted that his heart was beating quicker than it should be so he sent him in for further evaluation  The patient reports that he was asymptomatic now as well as for the last few days  He denies chest pain, pressure, or palpitations  He reports that he was walking 4 miles today as part of his normal exercise routine and he had no decreased exercise tolerance or shortness of breath  He denies prior cardiac history or taking any medications besides for 2 baby aspirin daily  He denies family history of cardiac problems  Denies recent surgeries or blood clots  Denies fevers or chills  The patient reports that he received a full cardiac work up that was uneventful 10-12 years ago due to a syncopal episode that was attributed to leg cramps  Review of Systems:    Review of Systems   Constitutional: Negative for appetite change, chills, diaphoresis, fatigue and fever  HENT: Negative for congestion, rhinorrhea and sore throat  Eyes: Negative for visual disturbance  Respiratory: Negative for cough, chest tightness, shortness of breath and wheezing  Cardiovascular: Negative for chest pain, palpitations and leg swelling  Gastrointestinal: Negative for abdominal pain, constipation, diarrhea, nausea and vomiting  Genitourinary: Negative for dysuria  Musculoskeletal: Negative for arthralgias and myalgias  Neurological: Negative for dizziness, syncope, weakness, light-headedness, numbness and headaches  All other systems reviewed and are negative  Past Medical and Surgical History:     No past medical history on file  No past surgical history on file  Meds/Allergies:    Prior to Admission medications    Medication Sig Start Date End Date Taking?  Authorizing Provider   Testosterone Cypionate 200 MG/ML KIT Inject 200 mg/mL into a muscle every 30 (thirty) days 6/7/18  Yes Historical Provider, MD   aspirin 81 MG tablet Take 81 mg by mouth 2 (two) times a day    Historical Provider, MD   Flaxseed, Linseed, (FLAXSEED OIL) 1000 MG CAPS Take 1,300 mg by mouth 2 (two) times a day    Historical Provider, MD   tadalafil (CIALIS) 20 MG tablet 20 mg every 24 hours    Historical Provider, MD     I have reviewed home medications with patient personally  Allergies: No Known Allergies    Social History:     Marital Status: /Civil Union   Occupation: Noncontributory   Patient Pre-hospital Living Situation: Home with wife  Patient Pre-hospital Level of Mobility: Full  Patient Pre-hospital Diet Restrictions: None  Substance Use History:   History   Alcohol Use No     History   Smoking Status    Former Smoker   Smokeless Tobacco    Never Used     History   Drug Use No       Family History:    No family history on file  Physical Exam:     Vitals:   Blood Pressure: (!) 163/112 (10/17/18 2017)  Pulse: (!) 120 (10/17/18 2017)  Temperature: 99 °F (37 2 °C) (10/17/18 1827)  Respirations: 20 (10/17/18 2017)  Weight - Scale: 81 2 kg (179 lb 0 2 oz) (10/17/18 1827)  SpO2: 96 % (10/17/18 2017)    Physical Exam   Constitutional: He is oriented to person, place, and time  He appears well-developed and well-nourished  Non-toxic appearance  No distress  HENT:   Head: Normocephalic and atraumatic  Mouth/Throat: Mucous membranes are not dry  Eyes: Conjunctivae and EOM are normal  No scleral icterus  Right pupil is round and reactive  Left pupil is round and reactive  Pupils are equal    Neck: Neck supple  Cardiovascular: S1 normal, S2 normal, normal heart sounds and intact distal pulses  An irregular rhythm present  Tachycardia present  Exam reveals no S3 and no S4  No murmur heard  Pulmonary/Chest: Effort normal and breath sounds normal  No accessory muscle usage  No respiratory distress  He has no decreased breath sounds  He has no wheezes  He has no rhonchi  He has no rales   He exhibits no tenderness  Abdominal: Soft  Bowel sounds are normal  He exhibits no distension and no mass  There is no tenderness  There is no rigidity, no rebound and no guarding  Neurological: He is alert and oriented to person, place, and time  He is not disoriented  GCS eye subscore is 4  GCS verbal subscore is 5  GCS motor subscore is 6  Skin: Skin is warm and dry  Additional Data:     Lab Results: I have personally reviewed pertinent reports  Results from last 7 days  Lab Units 10/17/18  1842   WBC Thousand/uL 7 64   HEMOGLOBIN g/dL 17 7*   HEMATOCRIT % 51 6*   PLATELETS Thousands/uL 209   NEUTROS ABS Thousands/µL 3 88   NEUTROS PCT % 50   LYMPHS PCT % 36   MONOS PCT % 9   EOS PCT % 4       Results from last 7 days  Lab Units 10/17/18  1842   SODIUM mmol/L 141   POTASSIUM mmol/L 4 0   CHLORIDE mmol/L 103   CO2 mmol/L 25   BUN mg/dL 20   CREATININE mg/dL 1 06   ANION GAP mmol/L 13   CALCIUM mg/dL 9 6   ALBUMIN g/dL 4 5   TOTAL BILIRUBIN mg/dL 0 60   ALK PHOS U/L 77   ALT U/L 36   AST U/L 20       Results from last 7 days  Lab Units 10/17/18  1842   INR  1 02                   Imaging: I have personally reviewed pertinent reports  XR chest 1 view portable   ED Interpretation by Linard Leventhal, DO (10/17 1931)          EKG, Pathology, and Other Studies Reviewed on Admission:   · EKG: A  Flutter, 120s BPM    Allscripts / Epic Records Reviewed: Yes     ** Please Note: This note has been constructed using a voice recognition system   **

## 2018-10-18 NOTE — ED NOTES
Med list updated but locked out of chart cannot amisha as reviewed at this time       Mariela Rogers, CE  10/17/18 7015 [facility], RN  10/17/18 2610

## 2018-10-18 NOTE — PHYSICIAN ADVISOR
Current patient class: Observation  The patient is currently on Hospital Day: 2 at [facility]        The patient was admitted to the hospital  on 10/17/18 at [address] [place] [address] for the following diagnosis:  Atrial flutter ([address] ) [I48 92]  Abnormal EKG [R94 31]  Typical atrial flutter ([address] ) [I48 3]     After review of the relevant documentation, labs, vital signs and test results, the patient is most appropriate for OBSERVATION STATUS  Rationale is as follows: The patient is a 76 yrs   Male who presented to the ED at 10/17/2018  6:24 PM with a chief complaint of Abnormal ECG (pt sent by doctor for abnormal EKG- Pt current , /114  Pt denies any signs and symptoms  EKG given by pt shows 5-6 beat run of Vtach)   The patient was admitted for asymptomatic aflutter  Cardiology consult noted  Initially on cardizem gtt but was discontinued  Currently receiving po toprol, diltiazem and eliquis  Monitoring HR on tele  Regular diet  No further testing at this time  Given his low intensity of service he is appropriate for observation LOC despite the expectation that he will require >2 midnight stay  The patients vitals on arrival were ED Triage Vitals   Temperature Pulse Respirations Blood Pressure SpO2   10/17/18 1827 10/17/18 1827 10/17/18 1827 10/17/18 1827 10/17/18 1827   99 °F (37 2 °C) (!) 150 18 (!) 190/114 99 %      Temp Source Heart Rate Source Patient Position - Orthostatic VS BP Location FiO2 (%)   10/17/18 2050 10/17/18 1827 10/17/18 1827 10/17/18 1827 --   Oral Monitor Sitting Right arm       Pain Score       10/17/18 1827       No Pain           History reviewed  No pertinent past medical history  History reviewed  No pertinent surgical history          Consults have been placed to:   IP CONSULT TO CARDIOLOGY    Vitals:    10/17/18 2050 10/17/18 2215 10/18/18 0744 10/18/18 1500   BP: (!) 195/83 158/82 125/83 137/86   BP Location: Left arm Left arm Left arm Left arm   Pulse: (!) 122 102 103 90   Resp: 20 18 18 18   Temp: 98 7 °F (37 1 °C) 98 8 °F (37 1 °C) 98 7 °F (37 1 °C) 98 6 °F (37 °C)   TempSrc: Oral Oral Oral Oral   SpO2: 97% 96% 96% 96%   Weight: 81 7 kg (180 lb 1 9 oz)      Height: 5' 8" (1 727 m)          Most recent labs:    Recent Labs      10/17/18   1842  10/17/18   2147  10/18/18   0033  10/18/18   0520   WBC  7 64   --    --    --    HGB  17 7*   --    --    --    HCT  51 6*   --    --    --    PLT  209  196   --    --    K  4 0   --    --   4 2   NA  141   --    --   140   CALCIUM  9 6   --    --   8 9   BUN  20   --    --   17   CREATININE  1 06   --    --   1 10   INR  1 02   --    --    --    TROPONINI  <0 02  <0 02  <0 02   --    AST  20   --    --    --    ALT  36   --    --    --    ALKPHOS  77   --    --    --        Scheduled Meds:  Current Facility-Administered Medications:  acetaminophen 650 mg Oral Q6H PRN Spencer Alas PA-C   apixaban 5 mg Oral BID Odette Muñoz DO   diltiazem 120 mg Oral Daily Chelsea Jara MD   [START ON 10/19/2018] metoprolol succinate 50 mg Oral Daily Chelsea Jara MD   ondansetron 4 mg Intravenous Q6H PRN Spencer Alas PA-C     Continuous Infusions:   PRN Meds:   acetaminophen    ondansetron    Surgical procedures (if appropriate):

## 2018-10-18 NOTE — CONSULTS
Consultation - General Cardiology   Lia Funez 76 y o  male MRN: [id number]  Unit/Bed#: -01 Encounter: [id number]    Inpatient consult to Cardiology  Consult performed by: Cecily Sood ordered by: Lizz Palma        Chief Complaint   Patient presents with    Abnormal ECG       pt sent by doctor for abnormal EKG- Pt current , /114  Pt denies any signs and symptoms  EKG given by pt shows 5-6 beat run of Vtach         Physician Requesting Consult: Reza Toth,   Reason for Consult / Principal Problem: typical atrial flutter      Assessment/ Plan  atrial flutter with RVR, asymptomatic, duration unknown  Rates are 70to 130 currently  · Currently on metoprolol tartrate 25 mg q 12 hours, an IV Cardizem drip  Metoprolol recently titrated up by the primary team   Will wean off IV Cardizem  · Start anticoagulation with Eliquis 5 mg b i d  The   Will consult case management for insurance coverage  · Since he is asymptomatic, will rate control and follow him up closely with EP as an outpatient for consideration of ablation  · An echocardiogram is pending  Hypertensive urgency  /94   As an outpatient, on no antihypertensives  Currently on metoprolol and Cardizem as above  He will be on metoprolol tartrate 50 mg p o  B i d   Will up titrate meds p r n  Hypogonadism-on testosterone and cialis as an outpatient  Hyperlipidemia-on Flax seed oil  as an outpatient        History of Present Illness   HPI: Lia Funez is a 76y o  year old male who has a history of hyperlipidemia and hypogonadism, treated with testosterone  He does not have known hypertension, arrhythmia,  CAD  Presented to his PCPs office yesterday for a routine annual visit  He is found to have asymptomatic tachycardia and subsequent atrial flutter  He was referred to [facility] by EMS  He was placed on oral metoprolol tartrate, 25 mg b i d , given IV Cardizem 20 mg last evening    An IV Cardizem drip was started for rapid rates  Currently he has variable rates ranging from the 70s to 130s    He is completely asymptomatic  He walks 3 to 4 miles a day and did so yesterday morning  He denies any dyspnea, chest pain ,palpitations, presyncope or syncope  He denies any change in his exercise tolerance  Approximately 10 years ago he underwent a cardiac evaluation including a stress test and an echocardiogram for syncope, [facility]   This was in the context of severe pain from an injury  He reports his testing was normal and was not advised to follow with Cardiology  Family history:  No family history of sudden cardiac death or CAD      EKG reviewed personally:  Atrial flutter  RBBB      Telemetry reviewed personally:  Atrial flutter rates ranging 110 to 130      Review of Systems   Constitution: Negative for chills and weakness  Cardiovascular: Negative for chest pain, claudication, cyanosis, dyspnea on exertion, irregular heartbeat, leg swelling, near-syncope, orthopnea, palpitations, paroxysmal nocturnal dyspnea and syncope  Respiratory: Negative for cough and shortness of breath  Gastrointestinal: Negative for heartburn and nausea  Neurological: Negative for dizziness, focal weakness, headaches and light-headedness  All other systems reviewed and are negative  Historical Information   History reviewed  No pertinent past medical history  History reviewed  No pertinent surgical history  History   Alcohol Use No     History   Drug Use No     History   Smoking Status    Former Smoker   Smokeless Tobacco    Never Used     Family History: History reviewed  No pertinent family history      Meds/Allergies   all current active meds have been reviewed, current meds:   Current Facility-Administered Medications   Medication Dose Route Frequency    acetaminophen (TYLENOL) tablet 650 mg  650 mg Oral Q6H PRN    aspirin chewable tablet 162 mg  162 mg Oral Daily    diltiazem (CARDIZEM) 125 mg in sodium chloride 0 9 % 125 mL infusion  1-15 mg/hr Intravenous Titrated    enoxaparin (LOVENOX) subcutaneous injection 40 mg  40 mg Subcutaneous Daily    metoprolol tartrate (LOPRESSOR) tablet 25 mg  25 mg Oral Q12H [address]    ondansetron (ZOFRAN) injection 4 mg  4 mg Intravenous Q6H PRN    and PTA meds:   Prior to Admission Medications   Prescriptions Last Dose Informant Patient Reported? Taking? Flaxseed, Linseed, (FLAXSEED OIL) 1000 MG CAPS   Yes No   Sig: Take 1,300 mg by mouth 2 (two) times a day   Testosterone Cypionate 200 MG/ML KIT   Yes Yes   Sig: Inject 200 mg/mL into a muscle every 30 (thirty) days   aspirin 81 MG tablet   Yes No   Sig: Take 81 mg by mouth 2 (two) times a day   tadalafil (CIALIS) 20 MG tablet   Yes No   Si mg every 24 hours      Facility-Administered Medications: None       diltiazem 1-15 mg/hr Last Rate: 2 5 mg/hr (10/18/18 0100)       No Known Allergies    Objective   Vitals: Blood pressure 125/83, pulse 103, temperature 98 7 °F (37 1 °C), temperature source Oral, resp  rate 18, height 5' 8" (1 727 m), weight 81 7 kg (180 lb 1 9 oz), SpO2 96 %  ,     Body mass index is 27 39 kg/m²  ,     Systolic (56PXV), UQ , Min:125 , CRUZITO:356     Diastolic (23MLF), DDI:00, Min:82, Max:114          No intake or output data in the 24 hours ending 10/18/18 0748  Weight (last 2 days)     Date/Time   Weight    10/17/18 2050  81 7 (180 12)    10/17/18 1827  81 2 (179 01)            Invasive Devices     Peripheral Intravenous Line            Peripheral IV 10/17/18 Right Antecubital less than 1 day                  Physical Exam   Constitutional: He is oriented to person, place, and time  No distress  HENT:   Head: Normocephalic and atraumatic  Eyes: Pupils are equal, round, and reactive to light  Neck: Normal range of motion  Neck supple  Cardiovascular: Normal rate, regular rhythm, normal heart sounds and intact distal pulses      Pulmonary/Chest: Effort normal and breath sounds normal    Abdominal: Soft  Bowel sounds are normal    Neurological: He is alert and oriented to person, place, and time  Skin: Skin is warm and dry  He is not diaphoretic  Psychiatric: He has a normal mood and affect  Nursing note and vitals reviewed  LABORATORY RESULTS:    Results from last 7 days  Lab Units 10/18/18  0033 10/17/18  2147 10/17/18  1842   TROPONIN I ng/mL <0 02 <0 02 <0 02     CBC with diff:   Results from last 7 days  Lab Units 10/17/18  2147 10/17/18  1842   WBC Thousand/uL  --  7 64   HEMOGLOBIN g/dL  --  17 7*   HEMATOCRIT %  --  51 6*   MCV fL  --  90   PLATELETS Thousands/uL 196 209   MCH pg  --  30 7   MCHC g/dL  --  34 3   RDW %  --  12 1   MPV fL 9 3 9 1   NRBC AUTO /100 WBCs  --  0       CMP:  Results from last 7 days  Lab Units 10/18/18  0520 10/17/18  1842   SODIUM mmol/L 140 141   POTASSIUM mmol/L 4 2 4 0   CHLORIDE mmol/L 104 103   CO2 mmol/L 24 25   BUN mg/dL 17 20   CREATININE mg/dL 1 10 1 06   CALCIUM mg/dL 8 9 9 6   AST U/L  --  20   ALT U/L  --  36   ALK PHOS U/L  --  77   EGFR ml/min/1 73sq m 69 72       BMP:  Results from last 7 days  Lab Units 10/18/18  0520 10/17/18  1842   SODIUM mmol/L 140 141   POTASSIUM mmol/L 4 2 4 0   CHLORIDE mmol/L 104 103   CO2 mmol/L 24 25   BUN mg/dL 17 20   CREATININE mg/dL 1 10 1 06   CALCIUM mg/dL 8 9 9 6          Lab Results   Component Value Date    NTBNP 319 (H) 10/17/2018            Results from last 7 days  Lab Units 10/18/18  0520 10/17/18  1842   MAGNESIUM mg/dL 2 1 2 2                 Results from last 7 days  Lab Units 10/17/18  1842   TSH 3RD GENERATON uIU/mL 3 037         Results from last 7 days  Lab Units 10/17/18  1842   INR  1 02           Imaging: I have personally reviewed pertinent reports  and I have personally reviewed pertinent films in PACS  Xr Chest 1 View Portable  Result Date: 10/18/2018  Narrative: CHEST INDICATION:   sob   COMPARISON:  None EXAM PERFORMED/VIEWS:  XR CHEST PORTABLE FINDINGS: Cardiomediastinal silhouette appears unremarkable  The lungs are clear  No pneumothorax or pleural effusion  Osseous structures appear within normal limits for patient age  Impression: No acute cardiopulmonary disease  Workstation performed: [id number]         Assessment  Principal Problem:    Typical atrial flutter (HCC)  Active Problems:    Hypertensive urgency          Thank you for allowing us to participate in this patient's care  Counseling / Coordination of Care  Total floor / unit time spent today 45 minutes  Greater than 50% of total time was spent with the patient and / or family counseling and / or coordination of care  A description of the counseling / coordination of care: Review of history, current assessment, development of a plan  Code Status: Level 1 - Full Code    ** Please Note: Dragon 360 Dictation voice to text software may have been used in the creation of this document   **

## 2018-10-18 NOTE — ASSESSMENT & PLAN NOTE
· POA, BP slightly improved but still 130s/100s on exam  · Start Metoprolol 25 mg PO BID   · Cardizem IV PRN

## 2018-10-18 NOTE — SOCIAL WORK
Pt's OOP cost for Eliquis is $42 per month  Pt and wife have been called by pharmacy and they would like to  the medication upon leaving the hospital   Pt is able to afford the medication

## 2018-10-18 NOTE — PLAN OF CARE
Problem: DISCHARGE PLANNING - CARE MANAGEMENT  Goal: Discharge to post-acute care or home with appropriate resources  INTERVENTIONS:  - Conduct assessment to determine patient/family and health care team treatment goals, and need for post-acute services based on payer coverage, community resources, and patient preferences, and barriers to discharge  - Address psychosocial, clinical, and financial barriers to discharge as identified in assessment in conjunction with the patient/family and health care team  - Arrange appropriate level of post-acute services according to patient's   needs and preference and payer coverage in collaboration with the physician and health care team  - Communicate with and update the patient/family, physician, and health care team regarding progress on the discharge plan  - Arrange appropriate transportation to post-acute venues   Outcome: Completed Date Met: 10/18/18  Pt's OOP cost for Eliquis is $42 per month  Pt and wife have been called by pharmacy and they would like to  the medication upon leaving the hospital   Pt is able to afford the medication

## 2018-10-18 NOTE — PLAN OF CARE
CARDIOVASCULAR - ADULT     Maintains optimal cardiac output and hemodynamic stability Progressing        SAFETY ADULT     Maintain or return to baseline ADL function Progressing

## 2018-10-18 NOTE — ASSESSMENT & PLAN NOTE
· POA, patient was asymptomatic and was found incidentally at his well visit today  No personal or family history of cardiac history  He had full cardiac work up 10-12 years ago due to a syncopal episode which was negative  This was thought to be from a vaso-vagal event  Currently the patient is stable in the 120s on the monitor   [id number] = 1   · Admit patient to med/surg under observation status with telemetry monitoring   · Consult cardiology   · Check TSH  · Trend troponin   · Metoprolol 25 mg PO Q12  · Cardizem 10 mg IV Q6 PRN tachycardia   · Check Echo  · Will make NPO as patient could be candidate for CAM with cardioversion

## 2018-10-19 VITALS
TEMPERATURE: 97.9 F | HEART RATE: 116 BPM | DIASTOLIC BLOOD PRESSURE: 95 MMHG | OXYGEN SATURATION: 97 % | WEIGHT: 180.12 LBS | SYSTOLIC BLOOD PRESSURE: 136 MMHG | HEIGHT: 68 IN | BODY MASS INDEX: 27.3 KG/M2 | RESPIRATION RATE: 18 BRPM

## 2018-10-19 PROCEDURE — 99217 PR OBSERVATION CARE DISCHARGE MANAGEMENT: CPT | Performed by: INTERNAL MEDICINE

## 2018-10-19 PROCEDURE — 99213 OFFICE O/P EST LOW 20 MIN: CPT | Performed by: INTERNAL MEDICINE

## 2018-10-19 RX ORDER — DILTIAZEM HYDROCHLORIDE 240 MG/1
240 CAPSULE, COATED, EXTENDED RELEASE ORAL DAILY
Status: DISCONTINUED | OUTPATIENT
Start: 2018-10-19 | End: 2018-10-19 | Stop reason: HOSPADM

## 2018-10-19 RX ORDER — METOPROLOL SUCCINATE 50 MG/1
50 TABLET, EXTENDED RELEASE ORAL ONCE
Status: COMPLETED | OUTPATIENT
Start: 2018-10-19 | End: 2018-10-19

## 2018-10-19 RX ORDER — METOPROLOL SUCCINATE 100 MG/1
100 TABLET, EXTENDED RELEASE ORAL DAILY
Qty: 30 TABLET | Refills: 0 | Status: SHIPPED | OUTPATIENT
Start: 2018-10-20 | End: 2018-11-16 | Stop reason: SDUPTHER

## 2018-10-19 RX ORDER — METOPROLOL SUCCINATE 100 MG/1
100 TABLET, EXTENDED RELEASE ORAL DAILY
Status: DISCONTINUED | OUTPATIENT
Start: 2018-10-20 | End: 2018-10-19 | Stop reason: HOSPADM

## 2018-10-19 RX ORDER — DILTIAZEM HYDROCHLORIDE 240 MG/1
240 CAPSULE, COATED, EXTENDED RELEASE ORAL DAILY
Qty: 30 CAPSULE | Refills: 0 | Status: SHIPPED | OUTPATIENT
Start: 2018-10-20 | End: 2018-11-08 | Stop reason: HOSPADM

## 2018-10-19 RX ORDER — METOPROLOL SUCCINATE 50 MG/1
50 TABLET, EXTENDED RELEASE ORAL DAILY
Status: DISCONTINUED | OUTPATIENT
Start: 2018-10-19 | End: 2018-10-19

## 2018-10-19 RX ADMIN — APIXABAN 5 MG: 5 TABLET, FILM COATED ORAL at 08:42

## 2018-10-19 RX ADMIN — DILTIAZEM HYDROCHLORIDE 15 MG: 5 INJECTION INTRAVENOUS at 00:28

## 2018-10-19 RX ADMIN — DILTIAZEM HYDROCHLORIDE 15 MG: 5 INJECTION INTRAVENOUS at 07:31

## 2018-10-19 RX ADMIN — METOPROLOL SUCCINATE 50 MG: 50 TABLET, EXTENDED RELEASE ORAL at 11:11

## 2018-10-19 RX ADMIN — DILTIAZEM HYDROCHLORIDE 240 MG: 240 CAPSULE, COATED, EXTENDED RELEASE ORAL at 08:42

## 2018-10-19 RX ADMIN — METOPROLOL SUCCINATE 50 MG: 50 TABLET, EXTENDED RELEASE ORAL at 08:40

## 2018-10-19 NOTE — PROGRESS NOTES
[address] Internal Medicine Progress Note  Patient: Rashard Crockett 76 y o  male   MRN: [id number]  PCP: Jose Ramon Parker  Unit/Bed#: -01 Encounter: [id number]  Date Of Visit: 10/19/18    Assessment:    Principal Problem:    Typical atrial flutter ([address] )  Active Problems:    Hypertensive urgency      Plan:    · Typical Atrial Flutter -   · Rate / Rhythm Control -   · Increased cardizem from 120 mg daily to 240 mg daily  Also continues on metoprolol 50 mg bid  Follow telemetry and determine if this decreases heart rate enough  If not, then additional medication adjustment vs  Procedure such as ablation could be considered sooner rather than post hospital   · Cardiology follow up  · Anticoagulation - Eliquis 5 mg PO bid  · Troponins negative, TSH negative  · Echocardiogram reviewed  EF 53% but otherwise looked ok  · Elevated Blood Pressure - continue to monitor with current medication adjustments above  Monitor blood pressures  VTE Pharmacologic Prophylaxis:   Pharmacologic: Enoxaparin (Lovenox)  Mechanical VTE Prophylaxis in Place: Yes    Patient Centered Rounds: I have performed bedside rounds with nursing staff today  Discussions with Specialists or Other Care Team Provider: Janice Gonzalez with cardiology team     Education and Discussions with Family / Patient: Patient only  Time Spent for Care: 30 minutes  More than 50% of total time spent on counseling and coordination of care as described above  Current Length of Stay: 1 day(s)    Current Patient Status: Observation   Certification Statement: If we can control heart rate then will discharge, but, if not, then the patient will require > 2 midnights stay to control atrial flutter  Discharge Plan / Estimated Discharge Date: To be determined  Code Status: Level 1 - Full Code      Subjective: The patient continues to remain asymptomatic  He denies any palpitations, chest pain, dyspnea, dizziness, or lightheadedness  No nausea  No vision changes  No headaches  He feels well overall  However, heart rate has remained over 120 for large portions of the last 24 hours since the the Cardizem drip was stopped  Objective:     Vitals:   Temp (24hrs), Av 2 °F (36 8 °C), Min:97 9 °F (36 6 °C), Max:98 6 °F (37 °C)    Temp:  [97 9 °F (36 6 °C)-98 6 °F (37 °C)] 97 9 °F (36 6 °C)  HR:  [] 120  Resp:  [18] 18  BP: (136-145)/(62-97) 136/62  SpO2:  [96 %-97 %] 97 %  Body mass index is 27 39 kg/m²  Input and Output Summary (last 24 hours): Intake/Output Summary (Last 24 hours) at 10/19/18 0941  Last data filed at 10/19/18 0101   Gross per 24 hour   Intake              900 ml   Output                0 ml   Net              900 ml       Physical Exam:     Physical Exam   Constitutional: He is oriented to person, place, and time  HENT:   Mouth/Throat: Oropharynx is clear and moist  No oropharyngeal exudate  Cardiovascular: Regular rhythm and intact distal pulses  No murmur heard  Tachycardic rate   Pulmonary/Chest: Effort normal and breath sounds normal  He has no wheezes  He has no rales  Abdominal: Soft  Bowel sounds are normal  He exhibits no distension  There is no tenderness  Musculoskeletal: He exhibits no edema or tenderness  Neurological: He is alert and oriented to person, place, and time  Skin: Skin is warm and dry  He is not diaphoretic  Vitals reviewed      Additional Data:     Labs:      Results from last 7 days  Lab Units 10/17/18  2147 10/17/18  1842   WBC Thousand/uL  --  7 64   HEMOGLOBIN g/dL  --  17 7*   HEMATOCRIT %  --  51 6*   PLATELETS Thousands/uL 196 209   NEUTROS PCT %  --  50   LYMPHS PCT %  --  36   MONOS PCT %  --  9   EOS PCT %  --  4       Results from last 7 days  Lab Units 10/18/18  0520 10/17/18  1842   SODIUM mmol/L 140 141   POTASSIUM mmol/L 4 2 4 0   CHLORIDE mmol/L 104 103   CO2 mmol/L 24 25   BUN mg/dL 17 20   CREATININE mg/dL 1 10 1 06   CALCIUM mg/dL 8 9 9 6   ALK PHOS U/L  -- 77   ALT U/L  --  36   AST U/L  --  20       Results from last 7 days  Lab Units 10/17/18  1842   INR  1 02       * I Have Reviewed All Lab Data Listed Above  * Additional Pertinent Lab Tests Reviewed: All Labs Within Last 24 Hours Reviewed    Imaging:    Imaging Reports Reviewed Today Include: Echocardiogram   Imaging Personally Reviewed by Myself Includes:  None    Recent Cultures (last 7 days):           Last 24 Hours Medication List:     Current Facility-Administered Medications:  acetaminophen 650 mg Oral Q6H PRN Spencer Barron PA-C   apixaban 5 mg Oral BID Abram Duncan DO   diltiazem 240 mg Oral Daily Abram Duncan DO   diltiazem 15 mg Intravenous Q6H PRN Spencer Barron PA-C   metoprolol succinate 50 mg Oral Daily Jair Kang MD   ondansetron 4 mg Intravenous Q6H PRN Ruy Rojo PA-C        Today, Patient Was Seen By: Abram Duncan DO    ** Please Note: This note has been constructed using a voice recognition system   **

## 2018-10-19 NOTE — PLAN OF CARE
CARDIOVASCULAR - ADULT     Maintains optimal cardiac output and hemodynamic stability Adequate for Discharge     Absence of cardiac dysrhythmias or at baseline rhythm Adequate for Discharge        Knowledge Deficit     Patient/family/caregiver demonstrates understanding of disease process, treatment plan, medications, and discharge instructions Adequate for Discharge        SAFETY ADULT     Maintain or return to baseline ADL function Adequate for Discharge

## 2018-10-19 NOTE — DISCHARGE INSTRUCTIONS
Dear Nilam Salinas,     It was our pleasure to care for you here at [facility]   It is our hope that we were always able to meet and exceed the expected standards for your care during your stay  You were hospitalized due to Rapid Atrial Flutter (an abnormal heart rhythm)  You were cared for on the 3rd floor under the service of Kristen Workman, DO with the [facility] who covers for your primary care physician (PCP), Mark Keita, while you were hospitalized  If you have any questions or concerns related to this hospitalization, you may contact us at [phone]  For follow up, we recommend that you follow up with your primary care physician  Please review this entire discharge summary as additional general instructions may be provided later as well  However, at this time we provide for you here, the most important instructions / recommendations at discharge:     · To control heart rate: Continue to take metoprolol at a dose of 100 mg daily  Continue taking diltiazem at a dose of 240 mg daily  · To reduce risk of clots / stroke: take eliquis 5 mg twice daily  Contact your PCP if you have any unusual bleeding (bleeding in stools, urine or other similar locations)  It is normal on this medication to bruise more easily if you bump yourself or to have to hold pressure longer to stop bleeding if you cut yourself or bleeding in gums if you brush too hard  · Follow up with heart doctors as recommended  · Contact cardiologist office or return to hospital if you develop any shortness of breath, palpitations, chest pain / pressure, or if you have any severe lightheadedness or dizziness, especially if associated with a rapid or irregular pulse  · Hold off on taking aspirin at this time until after seen by the heart doctor in the office    · While you can go about everyday activity as usual, we recommend avoiding strenuous activity until you have more time with the current medications first   · In terms of diet, the only current restriction is to avoid caffeine which can make heart rate increase further  · See also below for additional general information regarding you condition and treatments  Sincerely,     Clifford Osuna, DO     Atrial Flutter   WHAT YOU NEED TO KNOW:   Atrial flutter is an irregular heartbeat  It reduces your heart's ability to pump blood, which means you do not get enough oxygen  An irregular heartbeat could lead to a life-threatening blood clot or stroke  DISCHARGE INSTRUCTIONS:   Call 911 for any of the following:   · You have any of the following signs of a stroke:      ¨ Numbness or drooping on one side of your face     ¨ Weakness in an arm or leg    ¨ Confusion or difficulty speaking    ¨ Dizziness, a severe headache, or vision loss    · You have any of the following signs of a heart attack:      ¨ Squeezing, pressure, or pain in your chest that lasts longer than 5 minutes or returns    ¨ Discomfort or pain in your back, neck, jaw, stomach, or arm     ¨ Trouble breathing    ¨ Nausea or vomiting    ¨ Lightheadedness or a sudden cold sweat, especially with chest pain or trouble breathing  Seek care immediately if:  You have any of the following signs of a blood clot:  · You feel lightheaded, are short of breath, and have chest pain  · You cough up blood  · You have swelling, redness, pain, or warmth in your arm or leg  Contact your cardiologist or healthcare provider if:   · Your heart rate is higher or lower than your cardiologist says it should be  · You are bruising and bleeding more easily  · You have questions or concerns about your condition or care  Medicines: You may need any of the following:  · Heart medicines  help control your heart rate and rhythm  · Blood thinners    help prevent blood clots  Examples of blood thinners include heparin and warfarin  Clots can cause strokes, heart attacks, and death   The following are general safety guidelines to follow while you are taking a blood thinner:    ¨ Watch for bleeding and bruising while you take blood thinners  Watch for bleeding from your gums or nose  Watch for blood in your urine and bowel movements  Use a soft washcloth on your skin, and a soft toothbrush to brush your teeth  This can keep your skin and gums from bleeding  If you shave, use an electric shaver  Do not play contact sports  ¨ Tell your dentist and other healthcare providers that you take anticoagulants  Wear a bracelet or necklace that says you take this medicine  ¨ Do not start or stop any medicines unless your healthcare provider tells you to  Many medicines cannot be used with blood thinners  ¨ Tell your healthcare provider right away if you forget to take the medicine, or if you take too much  ¨ Warfarin  is a blood thinner that you may need to take  The following are things you should be aware of if you take warfarin  § Foods and medicines can affect the amount of warfarin in your blood  Do not make major changes to your diet while you take warfarin  Warfarin works best when you eat about the same amount of vitamin K every day  Vitamin K is found in green leafy vegetables and certain other foods  Ask for more information about what to eat when you are taking warfarin  § You will need to see your healthcare provider for follow-up visits when you are on warfarin  You will need regular blood tests  These tests are used to decide how much medicine you need  · Take your medicine as directed  Contact your healthcare provider if you think your medicine is not helping or if you have side effects  Tell him or her if you are allergic to any medicine  Keep a list of the medicines, vitamins, and herbs you take  Include the amounts, and when and why you take them  Bring the list or the pill bottles to follow-up visits  Carry your medicine list with you in case of an emergency    Follow up with your cardiologist as directed: You may need ongoing tests or treatment  Write down your questions so you remember to ask them during your visits  Manage atrial flutter:   · Know your target heart rate  Learn how to take your pulse and monitor your heart rate  · Control your blood pressure  Take your blood pressure medicine as directed  · Do not smoke  Nicotine and other chemicals in cigarettes and cigars can cause heart and lung damage  Ask your healthcare provider for information if you currently smoke and need help to quit  E-cigarettes or smokeless tobacco still contain nicotine  Talk to your healthcare provider before you use these products  · Limit alcohol  Women should limit alcohol to 1 drink a day  Men should limit alcohol to 2 drinks a day  A drink of alcohol is 12 ounces of beer, 5 ounces of wine, or 1½ ounces of liquor  · Eat heart-healthy foods  Heart-healthy foods include fruits, vegetables, whole-grain breads, low-fat dairy products, beans, lean meats, and fish  Replace butter and margarine with heart-healthy oils such as olive oil and canola oil  · Maintain a healthy weight  Ask your healthcare provider how much you should weigh  Ask him to help you create a weight loss plan if you are overweight  © 2017 [address]  Information is for End User's use only and may not be sold, redistributed or otherwise used for commercial purposes  All illustrations and images included in CareNotes® are the copyrighted property of [employer]  or [employer]cho[employer]  The above information is an  only  It is not intended as medical advice for individual conditions or treatments  Talk to your doctor, nurse or pharmacist before following any medical regimen to see if it is safe and effective for you  Diltiazem (By mouth)   Diltiazem ([id number])  Treats high blood pressure and angina (chest pain)  This medicine is a calcium channel blocker  Brand Name(s): Cardizem, Cardizem CD, Cardizem LA, Cartia XT, Dilt-XR, Matzim LA, Taztia XT, Tiazac   There may be other brand names for this medicine  When This Medicine Should Not Be Used: This medicine is not right for everyone  Do not use it if you had an allergic reaction to diltiazem or similar medicines  How to Use This Medicine:   Long Acting Capsule, 12 Hour Capsule, 24 Hour Capsule, Tablet, Long Acting Tablet  · Take your medicine as directed  Your dose may need to be changed several times to find what works best for you  · It is best to take this medicine on an empty stomach  · Swallow this medicine whole  Do not crush, break, chew, or open the capsule or tablet  · Missed dose: Take a dose as soon as you remember  If it is almost time for your next dose, wait until then and take a regular dose  Do not take extra medicine to make up for a missed dose  · Store the medicine in a closed container at room temperature, away from heat, moisture, and direct light  Drugs and Foods to Avoid:   Ask your doctor or pharmacist before using any other medicine, including over-the-counter medicines, vitamins, and herbal products  · Some medicines can affect how diltiazem works  Tell your doctor if you are using the following:  ¨ Buspirone, carbamazepine, cimetidine, clonidine, cyclosporine, digoxin, ivabradine, lovastatin, midazolam, quinidine, rifampin, simvastatin, triazolam  ¨ Other blood pressure medicine, including a beta-blocker    Warnings While Using This Medicine:   · Tell your doctor if you are pregnant or breastfeeding, or if you have kidney disease, liver disease, or digestion problems  Tell your doctor about all heart problems that you have, including heart failure or rhythm problems    · This medicine may cause the following problems:  ¨ Slow heartbeat  ¨ Worsening of heart failure  ¨ Serious skin reactions  · This medicine could lower your blood pressure too much, especially when you first use it or if you are dehydrated  Stand or sit up slowly if you feel lightheaded or dizzy  Do not drive or do anything else that could be dangerous until you know how this medicine affects you  · Do not stop using this medicine without asking your doctor, even if you feel well  This medicine will not cure high blood pressure, but it will help keep it in normal range  You may have to take blood pressure medicine for the rest of your life  · Your doctor will do lab tests at regular visits to check on the effects of this medicine  Keep all appointments  · Keep all medicine out of the reach of children  Never share your medicine with anyone  Possible Side Effects While Using This Medicine:   Call your doctor right away if you notice any of these side effects:  · Allergic reaction: Itching or hives, swelling in your face or hands, swelling or tingling in your mouth or throat, chest tightness, trouble breathing  · Blistering, peeling, red skin rash  · Dark urine or pale stools, nausea, vomiting, loss of appetite, stomach pain, yellow skin or eyes  · Fast, slow, uneven, or pounding heartbeat  · Rapid weight gain, swelling in your hands, ankles, or feet  If you notice other side effects that you think are caused by this medicine, tell your doctor  Call your doctor for medical advice about side effects  You may report side effects to FDA at 0-000-FDA-9491  © 2017 [address] [employer] Information is for End User's use only and may not be sold, redistributed or otherwise used for commercial purposes  The above information is an  only  It is not intended as medical advice for individual conditions or treatments  Talk to your doctor, nurse or pharmacist before following any medical regimen to see if it is safe and effective for you  Metoprolol (By mouth)   Metoprolol (met-oh-PROE-lol)  Treats high blood pressure, angina (chest pain), and heart failure  May lower the risk of death after a heart attack  This medicine is a beta-blocker  Brand Name(s): Lopressor, Toprol XL   There may be other brand names for this medicine  When This Medicine Should Not Be Used: This medicine is not right for everyone  Do not use if you had an allergic reaction to metoprolol or similar medicines  Do not use this medicine if you have certain blood circulation or heart problems  Ask your doctor about these problems  How to Use This Medicine:   Tablet, Long Acting Tablet  · Take your medicine as directed  Your dose may need to be changed several times to find what works best for you  · Take this medicine with a meal or right after a meal  Take this medicine the same way every day, at the same time  · Swallow the tablet whole with a glass of water  You may break the extended-release tablet in half, but do not chew or crush it  · Missed dose: Take a dose as soon as you remember  If it is almost time for your next dose, wait until then and take a regular dose  Do not take extra medicine to make up for a missed dose  · Store the medicine in a closed container at room temperature, away from heat, moisture, and direct light  Drugs and Foods to Avoid:   Ask your doctor or pharmacist before using any other medicine, including over-the-counter medicines, vitamins, and herbal products  · Some medicines can affect how metoprolol works   Tell your doctor if you are taking any of the following:   ¨ Digoxin, dipyridamole, hydralazine, hydroxychloroquine, methyldopa, quinidine  ¨ Medicine to treat depression (such as bupropion, clomipramine, desipramine, fluoxetine, fluvoxamine, paroxetine, sertraline), medicine to treat mental illness (such as chlorpromazine, fluphenazine, haloperidol, thioridazine), medicine for heart rhythm problems (such as propafenone), HIV/AIDS medicine (such as ritonavir), medicine to treat a fungus infection (such as terbinafine), a monoamine oxidase inhibitor (MAOI), an ergot medicine for headaches, a calcium channel blocker (such as amlodipine, diltiazem, verapamil), or an alpha blocker (such as clonidine, prazosin, reserpine, guanethidine)  Warnings While Using This Medicine:   · Tell your doctor if you are pregnant or breastfeeding, or if you have blood vessel, heart, or circulation problems (such as heart failure, rhythm problems, or slow heartbeat)  Tell your doctor if you have kidney disease, liver disease, diabetes, lung disease (such as asthma), an overactive thyroid, or a history of allergies  · This medicine may cause worse symptoms of heart failure while the dose is being adjusted  · Do not stop using this medicine suddenly  Your doctor will need to slowly decrease your dose before you stop it completely  · Tell any doctor or dentist who treats you that you are using this medicine  You may need to stop using this medicine several days before you have surgery or medical tests  · This medicine could lower your blood pressure too much, especially when you first use it or if you are dehydrated  Stand or sit up slowly if you feel lightheaded or dizzy  · This medicine may make you dizzy or drowsy  Do not drive or do anything else that could be dangerous until you know how this medicine affects you  · Your doctor will check your progress and the effects of this medicine at regular visits  Keep all appointments  · Keep all medicine out of the reach of children  Never share your medicine with anyone    Possible Side Effects While Using This Medicine:   Call your doctor right away if you notice any of these side effects:  · Allergic reaction: Itching or hives, swelling in your face or hands, swelling or tingling in your mouth or throat, chest tightness, trouble breathing  · Lightheadedness, dizziness, or fainting  · Slow heartbeat  · Swelling in your hands, ankles, or feet, trouble breathing, tiredness  · Worsening chest pain  If you notice these less serious side effects, talk with your doctor: · Diarrhea  · Mild dizziness or tiredness  If you notice other side effects that you think are caused by this medicine, tell your doctor  Call your doctor for medical advice about side effects  You may report side effects to FDA at 5-710-TRJ-3514  © 2017 [address] [employer] [employer] Information is for End User's use only and may not be sold, redistributed or otherwise used for commercial purposes  The above information is an  only  It is not intended as medical advice for individual conditions or treatments  Talk to your doctor, nurse or pharmacist before following any medical regimen to see if it is safe and effective for you  Apixaban (By mouth)   Apixaban (a-PIX-a-ban)  Treats and prevents blood clots  This medicine is a blood thinner  Brand Name(s): Eliquis   There may be other brand names for this medicine  When This Medicine Should Not Be Used: This medicine is not right for everyone  Do not use it if you had an allergic reaction to apixaban or you have active bleeding  How to Use This Medicine:   Tablet  · Your doctor will tell you how much medicine to use  Do not use more than directed  · If you are not able to swallow the tablets whole, they may be crushed and mixed in water, 5% dextrose in water (D5W), apple juice, or applesauce  The crushed tablets may be mixed with 60 mL of water or D5W dose and given through a nasogastric tube (NGT)  · This medicine should come with a Medication Guide  Ask your pharmacist for a copy if you do not have one  · Missed dose: Take a dose as soon as you remember  If it is almost time for your next dose, wait until then and take a regular dose  Do not take extra medicine to make up for a missed dose  · Store the medicine in a closed container at room temperature, away from heat, moisture, and direct light    Drugs and Foods to Avoid:   Ask your doctor or pharmacist before using any other medicine, including over-the-counter medicines, vitamins, and herbal products  · Some medicines can affect how apixaban works  Tell your doctor if you are using any of the following:   ¨ Carbamazepine, clarithromycin, itraconazole, ketoconazole, phenytoin, rifampin, ritonavir, Jacki's wort  ¨ Blood thinner (including clopidogrel, heparin, prasugrel, warfarin)  ¨ Medicine to treat depression  ¨ NSAID pain or arthritis medicine (including aspirin, celecoxib, diclofenac, ibuprofen, naproxen)  Warnings While Using This Medicine:   · Tell your doctor if you are pregnant or breastfeeding, or if you have kidney disease, liver disease, bleeding problems, or an artificial heart valve  · Do not stop using this medicine suddenly without asking your doctor  You might have a higher risk of stroke for a short time after you stop using this medicine  · This medicine increases your risk for bleeding that can become serious if not controlled  You may also bruise easily, and it may take longer than usual for bleeding to stop  · This medicine may increase your risk for blood clots in your spine or back if you undergo an epidural or spinal puncture  This could lead to paralysis  Tell your doctor if you ever had spine problems or back surgery  · Tell any doctor or dentist who treats you that you are using this medicine  With your doctor's supervision, you may need to stop using this medicine several days before you have surgery or medical tests  · Your doctor will do lab tests at regular visits to check on the effects of this medicine  Keep all appointments  · Keep all medicine out of the reach of children  Never share your medicine with anyone    Possible Side Effects While Using This Medicine:   Call your doctor right away if you notice any of these side effects:  · Allergic reaction: Itching or hives, swelling in your face or hands, swelling or tingling in your mouth or throat, chest tightness, trouble breathing  · Change in how much or how often you urinate, red or pink urine  · Chest pain, trouble breathing  · Coughing up blood, vomiting blood or material that looks like coffee grounds  · Numbness, tingling, or muscle weakness in your legs or feet  · Red or black, tarry stools  · Unusual bleeding, bruising, or weakness  If you notice other side effects that you think are caused by this medicine, tell your doctor  Call your doctor for medical advice about side effects  You may report side effects to FDA at 4-802-FDA-1113  © 2017 [address] [employer] Information is for End User's use only and may not be sold, redistributed or otherwise used for commercial purposes  The above information is an  only  It is not intended as medical advice for individual conditions or treatments  Talk to your doctor, nurse or pharmacist before following any medical regimen to see if it is safe and effective for you

## 2018-10-19 NOTE — DISCHARGE SUMMARY
Discharge Summary - [facility] Internal [facility]    Patient Information: Kriss Salguero 76 y o  male MRN: [id number]  Unit/Bed#: -01 Encounter: [id number]    Discharging Physician / Practitioner: Madelaine Odom DO  PCP: Graham Steven  Admission Date: 10/17/2018  Discharge Date: 10/19/18    Disposition:     Home     Reason for Admission: Asymptomatic Tachycardia with atrial flutter  on Routine Exam    Discharge Diagnoses:     Principal Problem:    Typical atrial flutter ([address] )  Active Problems:    Hypertensive urgency  Resolved Problems:    * No resolved hospital problems  *      Consultations During Hospital Stay:  · Cardiology - Dr Houston Lawrence    Procedures Performed:     · None    Significant Findings / Test Results:     · Chest x-ray 10/18/2018 - no acute cardiopulmonary disease  · Basic metabolic profiles grossly normal during this hospitalization  · Troponin less than 0 02 x 3   · NT proBNP 319  · CBC grossly normal except for the fact that the hemoglobin was 17 7 with hematocrit of 51 6  · TSH is 3 037  · EKG on admission showed atrial flutter with a variable AV block and right bundle branch block  Rate was 124 beats per minute  Incidental Findings:   · None    Test Results Pending at Discharge (will require follow up): · None     Outpatient Tests Requested:  · None  Complications: None    Hospital Course:     Kriss Salguero is a 76 y o  male patient who originally presented to the hospital on 10/17/2018 due to tachycardia noted on exam by his primary care physician on the day of admission  The patient reports that he walks 4 miles per day as part of his normal exercise and has had no decreased exercise tolerance, no shortness of breath, no palpitations or chest pain  No chest pressure  No dizziness or lightheadedness  Nonetheless, when the patient presents to the hospital he has rapid atrial flutter  The patient is admitted into the hospital and troponins were trended    All troponins are negative  Additionally TSH is negative  The patient is initially placed on metoprolol 25 milligrams every 12 hours as well as Cardizem 10 milligrams IV q 6 hours as needed  The patient was then seen by Cardiology  Recommendation from Cardiology was for the patient to have rate control during the hospitalization had and then he would discharge from the hospital where he would then see electrophysiology for potential ablation procedure  However, no urgent cardioversion or ablation was felt necessary during this hospitalization  The patient would be placed on Cardizem at a dose of 120 milligrams daily  Then on 10/19/2018 the dose would be increased up to 240 milligrams daily  The patient's metoprolol would gradually be increased up to 50 milligrams twice daily  In terms of anticoagulation, recommendation was made for Eliquis 5 milligrams p o  b i d     The patient would have an echocardiogram done which showed an ejection fraction of 53 percent but otherwise looked okay  Today, the patient was felt to be stable for discharge as his heart rate did improve significantly down into the 70s with this regimen  Patient was cleared by Cardiology for discharge  As stated above, patient will follow up with electrophysiology in the outpatient setting for additional evaluations and treatment  Condition at Discharge: stable     Discharge Day Visit / Exam:     * Please refer to separate progress note for these details *    Discussion with Family: patient only  Discharge instructions/Information to patient and family:   See after visit summary for information provided to patient and family  Provisions for Follow-Up Care:  See after visit summary for information related to follow-up care and any pertinent home health orders  Planned Readmission: None    Discharge Statement:  I spent 38 minutes discharging the patient  This time was spent on the day of discharge   I had direct contact with the patient on the day of discharge  Greater than 50% of the total time was spent examining patient, answering all patient questions, arranging and discussing plan of care with patient as well as directly providing post-discharge instructions  Additional time then spent on discharge activities  Discharge Medications:  See after visit summary for reconciled discharge medications provided to patient and family  ** Please Note: This note has been constructed using a voice recognition system   **

## 2018-10-19 NOTE — PHYSICIAN ADVISOR
The case was reviewed again since significant events occurred after the last physician advisor note  Heart rate increased to 128 at 10:07 p m  and has remained elevated (116 to 128)  He received two doses of intravenous Cardizem to allow for better rate control  This morning, his oral Cardizem dose was increased  The patient also received an extra dose of Toprol-XL 50 mg  The primary diagnosis is atrial flutter with rapid ventricular response  I contacted the provider via messaging  If the patient requires ongoing hospitalization for improved rate control and is not appropriate for discharge today then I recommend changing status to inpatient level care

## 2018-10-19 NOTE — TREATMENT PLAN
He remains in atrial flutter with rapid rates  This morning, his Cardizem dose was increased  He has been receiving p r n  Doses of IV Cardizem, earlier  He is on Toprol XL 50 mg daily, and did receive this this morning as well  His current systolic blood pressure is  120  Will give an extra dose of Toprol-XL 50 mg x1 now    Monitor telemetry

## 2018-10-19 NOTE — PROGRESS NOTES
General Cardiology   Progress Note -     Faheem Mancuso 76 y o  male MRN: [id number]    Unit/Bed#: -[place] Encounter: [id number]    Assessment/ Plan  atrial flutter with RVR, asymptomatic, duration unknown   · Currently on Toprol  mg total dose, Cardizem  mg daily     · Started on anticoagulation with Eliquis 5 mg b i d    · Since he is asymptomatic, will rate control and follow him up closely with EP as an outpatient for consideration of ablation  · Echocardiogram-EF 53%  Right atrium mildly dilated  Hypertensive urgency  /80, currently  On admission-190/114  As an outpatient, on no antihypertensives  Currently on Toprol- mg total dose, Cardizem CD 2 40 mg daily  Monitor      One satisfactory rate controlled obtain he may be discharged, from a cardiac standpoint to follow up with EPS on the     Subjective  Review of Systems   Constitution: Negative for chills and weakness  Cardiovascular: Negative for chest pain, claudication, cyanosis, dyspnea on exertion, irregular heartbeat, leg swelling, near-syncope, orthopnea, palpitations, paroxysmal nocturnal dyspnea and syncope  Respiratory: Negative for cough and shortness of breath  Gastrointestinal: Negative for heartburn and nausea  Neurological: Negative for dizziness, focal weakness, headaches and light-headedness  All other systems reviewed and are negative  Objective:   Vitals: Blood pressure 118/78, pulse (!) 120, temperature 97 9 °F (36 6 °C), temperature source Oral, resp  rate 18, height 5' 8" (1 727 m), weight 81 7 kg (180 lb 1 9 oz), SpO2 97 %  ,       Body mass index is 27 39 kg/m²  ,     Systolic (27OUD), GID:998 , Min:118 , DKK:023     Diastolic (98DTI), :05, Min:62, Max:97          Intake/Output Summary (Last 24 hours) at 10/19/18 1039  Last data filed at 10/19/18 0101   Gross per 24 hour   Intake              900 ml   Output                0 ml   Net              900 ml     Weight (last 2 days)     Date/Time   Weight    10/17/18 2050  81 7 (180 12)    10/17/18 1827  81 2 (179 01)                 Telemetry Review: a flutter, 130s earlier, now 100        Physical Exam   Constitutional: He is oriented to person, place, and time  No distress  HENT:   Head: Normocephalic and atraumatic  Eyes: Pupils are equal, round, and reactive to light  Neck: Normal range of motion  Neck supple  Cardiovascular: Regular rhythm and intact distal pulses  Tachycardia present  Exam reveals distant heart sounds  Pulmonary/Chest: Effort normal and breath sounds normal    Abdominal: Soft  Bowel sounds are normal    Neurological: He is alert and oriented to person, place, and time  Skin: Skin is warm and dry  He is not diaphoretic  Psychiatric: He has a normal mood and affect  Nursing note and vitals reviewed        LABORATORY RESULTS    Results from last 7 days  Lab Units 10/18/18  0033 10/17/18  2147 10/17/18  1842   TROPONIN I ng/mL <0 02 <0 02 <0 02     CBC with diff:   Results from last 7 days  Lab Units 10/17/18  2147 10/17/18  1842   WBC Thousand/uL  --  7 64   HEMOGLOBIN g/dL  --  17 7*   HEMATOCRIT %  --  51 6*   MCV fL  --  90   PLATELETS Thousands/uL 196 209   MCH pg  --  30 7   MCHC g/dL  --  34 3   RDW %  --  12 1   MPV fL 9 3 9 1   NRBC AUTO /100 WBCs  --  0       CMP:  Results from last 7 days  Lab Units 10/18/18  0520 10/17/18  1842   SODIUM mmol/L 140 141   POTASSIUM mmol/L 4 2 4 0   CHLORIDE mmol/L 104 103   CO2 mmol/L 24 25   BUN mg/dL 17 20   CREATININE mg/dL 1 10 1 06   CALCIUM mg/dL 8 9 9 6   AST U/L  --  20   ALT U/L  --  36   ALK PHOS U/L  --  77   EGFR ml/min/1 73sq m 69 72       BMP:  Results from last 7 days  Lab Units 10/18/18  0520 10/17/18  1842   SODIUM mmol/L 140 141   POTASSIUM mmol/L 4 2 4 0   CHLORIDE mmol/L 104 103   CO2 mmol/L 24 25   BUN mg/dL 17 20   CREATININE mg/dL 1 10 1 06   CALCIUM mg/dL 8 9 9 6       Lab Results   Component Value Date    NTBNP 319 (H) 10/17/2018 Results from last 7 days  Lab Units 10/18/18  0520 10/17/18  1842   MAGNESIUM mg/dL 2 1 2 2                 Results from last 7 days  Lab Units 10/17/18  1842   TSH 3RD GENERATON uIU/mL 3 037         Results from last 7 days  Lab Units 10/17/18  1842   INR  1 02       Lipid Profile:   No results found for: CHOL  No results found for: HDL  No results found for: LDLCALC  No results found for: TRIG    Cardiac testing:   Results for orders placed during the hospital encounter of 10/17/18   Echo complete with contrast if indicated    Narrative [address] [facility]  [phone]    Transthoracic Echocardiogram  2D, M-mode, Doppler, and Color Doppler    Study date:  18-Oct-2018    Patient: Arabella Masters  MR number: [id number]  Account number: [de-identified]  : 1950  Age: 76 years  Gender: Male  Status: Outpatient  Location: Bedside  Height: 68 in  Weight: 179 5 lb  BP: 125/ 83 mmHg    Indications: Atrial Flutter    Diagnoses: I48 1 - Atrial flutter    Sonographer:  Kay Hernandez RDCS  Primary Physician:  Maggie Perkins MD  Referring Physician:  Fariba Alonso PA-C  Group:  [facility]  Interpreting Physician:  Bradley Morrell MD    SUMMARY    LEFT VENTRICLE:  Systolic function was at the lower limits of normal  Ejection fraction was estimated to be 53 %  There were no regional wall motion abnormalities  RIGHT VENTRICLE:  The ventricle was mildly dilated  Systolic function was normal     RIGHT ATRIUM:  The atrium was mildly dilated  HISTORY: PRIOR HISTORY: hypertension    PROCEDURE: The procedure was performed at the bedside  This was a routine study  The transthoracic approach was used  The study included complete 2D imaging, M-mode, complete spectral Doppler, and color Doppler  The heart rate was 116 bpm,  at the start of the study  Images were obtained from the parasternal, apical, subcostal, and suprasternal notch acoustic windows  Image quality was adequate  LEFT VENTRICLE: Size was normal  Systolic function was at the lower limits of normal  Ejection fraction was estimated to be 53 %  There were no regional wall motion abnormalities  Wall thickness was normal  DOPPLER: Transmitral flow  pattern: atrial fibrillation  RIGHT VENTRICLE: The ventricle was mildly dilated  Systolic function was normal  Wall thickness was normal     LEFT ATRIUM: Size was normal     RIGHT ATRIUM: The atrium was mildly dilated  MITRAL VALVE: Valve structure was normal  There was normal leaflet separation  DOPPLER: The transmitral velocity was within the normal range  There was no evidence for stenosis  There was no significant regurgitation  AORTIC VALVE: The valve was trileaflet  Leaflets exhibited normal thickness and normal cuspal separation  DOPPLER: Transaortic velocity was within the normal range  There was no evidence for stenosis  There was no significant  regurgitation  TRICUSPID VALVE: The valve structure was normal  There was normal leaflet separation  DOPPLER: The transtricuspid velocity was within the normal range  There was no evidence for stenosis  There was no significant regurgitation  PULMONIC VALVE: Leaflets exhibited normal thickness, no calcification, and normal cuspal separation  DOPPLER: The transpulmonic velocity was within the normal range  There was no significant regurgitation  PERICARDIUM: There was no pericardial effusion  The pericardium was normal in appearance  AORTA: The root exhibited normal size  SYSTEMIC VEINS: IVC: The inferior vena cava was normal in size      SYSTEM MEASUREMENT TABLES    2D  %FS: 28 57 %  AV Diam: 3 14 cm  EDV(Teich): 82 2 ml  EF(Teich): 55 38 %  ESV(Teich): 36 68 ml  IVSd: 0 89 cm  LA Area: 17 04 cm2  LA Diam: 3 83 cm  LVEDV MOD A4C: 72 9 ml  LVEF MOD A4C: 62 23 %  LVESV MOD A4C: 27 53 ml  LVIDd: 4 28 cm  LVIDs: 3 06 cm  LVLd A4C: 7 12 cm  LVLs A4C: 5 71 cm  LVPWd: 0 9 cm  RA Area: 21 54 cm2  RVIDd: 4 02 cm  SV MOD A4C: 45 37 ml  SV(Teich): 45 53 ml    CW  TR MaxP 56 mmHg  TR Vmax: 2 15 m/s    MM  TAPSE: 2 08 cm    [facility]    Prepared and electronically signed by    Loni Gupta MD  Signed 18-Oct-2018 15:40:38         Meds/Allergies   all current active meds have been reviewed and current meds:   Current Facility-Administered Medications   Medication Dose Route Frequency    acetaminophen (TYLENOL) tablet 650 mg  650 mg Oral Q6H PRN    apixaban (ELIQUIS) tablet 5 mg  5 mg Oral BID    diltiazem (CARDIZEM CD) 24 hr capsule 240 mg  240 mg Oral Daily    diltiazem (CARDIZEM) injection 15 mg  15 mg Intravenous Q6H PRN    metoprolol succinate (TOPROL-XL) 24 hr tablet 50 mg  50 mg Oral Daily    metoprolol succinate (TOPROL-XL) 24 hr tablet 50 mg  50 mg Oral Daily    ondansetron (ZOFRAN) injection 4 mg  4 mg Intravenous Q6H PRN     Prescriptions Prior to Admission   Medication    Testosterone Cypionate 200 MG/ML KIT    aspirin 81 MG tablet    Flaxseed, Linseed, (FLAXSEED OIL) 1000 MG CAPS    tadalafil (CIALIS) 20 MG tablet            Assessment:  Principal Problem:    Typical atrial flutter (HCC)  Active Problems:    Hypertensive urgency        Counseling / Coordination of Care  Total floor / unit time spent today 20 minutes  Greater than 50% of total time was spent with the patient and / or family counseling and / or coordination of care  ** Please Note: Dragon 360 Dictation voice to text software may have been used in the creation of this document   **

## 2018-10-25 ENCOUNTER — OFFICE VISIT (OUTPATIENT)
Dept: CARDIOLOGY CLINIC | Facility: CLINIC | Age: 68
End: 2018-10-25
Payer: MEDICARE

## 2018-10-25 VITALS
BODY MASS INDEX: 27.34 KG/M2 | DIASTOLIC BLOOD PRESSURE: 68 MMHG | HEART RATE: 97 BPM | WEIGHT: 180.4 LBS | SYSTOLIC BLOOD PRESSURE: 106 MMHG | HEIGHT: 68 IN

## 2018-10-25 DIAGNOSIS — I48.3 TYPICAL ATRIAL FLUTTER (HCC): ICD-10-CM

## 2018-10-25 DIAGNOSIS — I48.91 ATRIAL FIBRILLATION, UNSPECIFIED TYPE (HCC): Primary | ICD-10-CM

## 2018-10-25 DIAGNOSIS — I48.92 ATRIAL FLUTTER, UNSPECIFIED TYPE (HCC): Primary | ICD-10-CM

## 2018-10-25 DIAGNOSIS — I45.10 RBBB: ICD-10-CM

## 2018-10-25 PROBLEM — I16.0 HYPERTENSIVE URGENCY: Status: RESOLVED | Noted: 2018-10-17 | Resolved: 2018-10-25

## 2018-10-25 PROCEDURE — 93000 ELECTROCARDIOGRAM COMPLETE: CPT | Performed by: INTERNAL MEDICINE

## 2018-10-25 PROCEDURE — 99215 OFFICE O/P EST HI 40 MIN: CPT | Performed by: INTERNAL MEDICINE

## 2018-10-25 NOTE — LETTER
October 25, 2018     [facility] [address]    Patient: Gabo Quintana   YOB: 1950   Date of Visit: 10/25/2018       Dear Dr Smith Ear:    Thank you for referring Desean Henriquez to me for evaluation  Below are my notes for this consultation  If you have questions, please do not hesitate to call me  I look forward to following your patient along with you  Sincerely,        Mony Suresh MD        CC: No Recipients  Mony Suresh MD  10/25/2018  9:08 AM  Sign at close encounter  [address] [facility]    Outpatient New Consult   Today's Date: 10/25/18        Patient name: Gabo Quintana  YOB: 1950  Sex: male         Chief Complaint: Referral from DR Smith Ear for Atrial flutter    ASSESSMENT:  Problem List Items Addressed This Visit     Typical atrial flutter (HCC)    RBBB      Other Visit Diagnoses     Atrial fibrillation, unspecified type [facility])    -  Primary    Relevant Orders    POCT ECG          75 yo male  1) Typical aflutter w RVR found incidentally in routine physical at PCP  HR 120s, was asymptomatic, actually able to walk about  4 miles recently  No edemta, no cp, no palpitations  EF remains low normal 53%  Mild RV dilation and LUBA  No valve disease  Started on TOprol XL 100mg and ELiquis  [id number]=8 (Age and HTN)  2) HTN-urgency had /114 on admission  3) He does snore but no daytime fatigue or other symptoms of PRUDENCIO  Denies heavy ETOH use  PLAN:  1  Recommend aflutter ablation for typical flutter  CAM since not anticoagulated for 3 weeks yet  2  Continue Eliquis for at least one month post op  3  Recommend >4 week cardiac monitoring to guide anticoagualtion and cardiac medications given high probability of afib or other arrhythmias in patients who have flutter  4   Will consider PRUDENCIO screening in future      Risks, benefits and alternatives to ablation of Supraventricular Tachycardia (and/or atrial flutter) were discussed  Alternative options of medical therapy and observation discussed  Treatment success is estimated 95% but may vary depending on the exact mechanism of arrhythmia that cannot be determined until during procedure and repeat ablation maybe required  Risks include but are not limited to heart and vascular injury, bleeding, bleeding around the heart, need for a pacemakerand rarely risk of open heart surgery/stroke/death  Orders Placed This Encounter   Procedures    POCT ECG     There are no discontinued medications    HPI/Subjective:       75 yo male  1) Typical aflutter w RVR found incidentally in routine physical at PCP  HR 120s, was asymptomatic, actually able to walk about  4 miles recently  No edemta, no cp, no palpitations  EF remains low normal 53%  Mild RV dilation and LUBA  No valve disease  Started on TOprol XL 100mg and ELiquis  [id number]=5 (Age and HTN)  2) HTN-urgency had /114 on admission  3) He does snore but no daytime fatigue or other symptoms of PRUDENCIO  Denies heavy ETOH use  He is doing well overall  DOes not like taking medications, all of which are new since dx of flutter  Completely asymptomatic and able to walk long distance without issue  Please note HPI is listed by problem with with update following it, it is copied again in the assessment above and reflects medical decision making as well  Complete 12 point ROS reviewed and otherwise non pertinent or negative except as per HPI pertinent positives in Cardiovascular and Respiratory emphasized  Please see paper chart for outpatient clinic patients where the patient completed the 12 point ROS survey  No past medical history on file  No Known Allergies  I reviewed the Home Medication list and Allergies in the chart     Scheduled Meds:  Current Outpatient Prescriptions   Medication Sig Dispense Refill    apixaban (ELIQUIS) 5 mg Take 1 tablet (5 mg total) by mouth 2 (two) times a day 60 tablet 0    diltiazem (CARDIZEM CD) 240 mg 24 hr capsule Take 1 capsule (240 mg total) by mouth daily 30 capsule 0    Flaxseed, Linseed, (FLAXSEED OIL) 1000 MG CAPS Take 1,300 mg by mouth 2 (two) times a day      metoprolol succinate (TOPROL-XL) 100 mg 24 hr tablet Take 1 tablet (100 mg total) by mouth daily 30 tablet 0    Testosterone Cypionate 200 MG/ML KIT Inject 200 mg/mL into a muscle every 30 (thirty) days       No current facility-administered medications for this visit  PRN Meds:  No family history on file  Social History     Social History    Marital status: /Civil Union     Spouse name: N/A    Number of children: N/A    Years of education: N/A     Occupational History    Not on file  Social History Main Topics    Smoking status: Former Smoker    Smokeless tobacco: Never Used    Alcohol use No    Drug use: No    Sexual activity: Not on file     Other Topics Concern    Not on file     Social History Narrative    No narrative on file         OBJECTIVE:    /68 (BP Location: Right arm, Patient Position: Sitting, Cuff Size: Standard)   Pulse 97   Ht 5' 8" (1 727 m)   Wt 81 8 kg (180 lb 6 4 oz)   BMI 27 43 kg/m²    Vitals:    10/25/18 0832   Weight: 81 8 kg (180 lb 6 4 oz)     GEN: No acute distress, Alert and oriented, well appearing  HEENT:Head, neck, ears, oral pharynx: Mucus membranes moist, oral pharynx clear, nares clear   External ears normal  EYES: Pupils equal, sclera anicteric, midline, normal conjuctiva  NECK: No JVD, supple, no obvious masses or thryomegaly or goiter  CARDIOVASCULAR: tachycardic RRR, No murmur, rub, gallops S1,S2  LUNGS: Clear To auscultation bilaterally, normal effort, no rales, rhonchi, crackles  ABDOMEN:  nondistended,  without obvious organomegaly or ascites  EXTREMITIES/VASCULAR:  No edema  Radial pulses intact, pedal pulses difficult to palpate, warm an well perfused  PSYCH: Normal Affect, no overt suicidal ideation, linear speech pattern without evidence of psychosis  NEURO: Grossly intact, moving all extremiteis equal, face symmetric, alert and responsive, no obvious focal defecits  GAIT:  Ambulates normally without difficulty  HEME: No bleeding, bruising, petechia, purpura  SKIN: No significant rashes, warm, no diaphoresis or pallor  Lab Results:       LABS:      Chemistry        Component Value Date/Time     10/18/2018 0520    K 4 2 10/18/2018 0520     10/18/2018 0520    CO2 24 10/18/2018 0520    BUN 17 10/18/2018 05    CREATININE 1 10 10/18/2018 0520        Component Value Date/Time    CALCIUM 8 9 10/18/2018 0520    ALKPHOS 77 10/17/2018 1842    AST 20 10/17/2018 1842    ALT 36 10/17/2018 1842            No results found for: CHOL  No results found for: HDL  No results found for: LDLCALC  No results found for: TRIG  No components found for: CHOLHDL    IMAGING: Xr Chest 1 View Portable    Result Date: 10/18/2018  Narrative: CHEST INDICATION:   sob  COMPARISON:  None EXAM PERFORMED/VIEWS:  XR CHEST PORTABLE FINDINGS: Cardiomediastinal silhouette appears unremarkable  The lungs are clear  No pneumothorax or pleural effusion  Osseous structures appear within normal limits for patient age  Impression: No acute cardiopulmonary disease   Workstation performed: [id number]        Cardiac testing:   Results for orders placed during the hospital encounter of 10/17/18   Echo complete with contrast if indicated    Narrative [place] [address] [address] [place] [address]  [phone]    Transthoracic Echocardiogram  2D, M-mode, Doppler, and Color Doppler    Study date:  18-Oct-2018    Patient: Beronica Bravo  MR number: [id number]  Account number: [de-identified]  : 1950  Age: 76 years  Gender: Male  Status: Outpatient  Location: Bedside  Height: 68 in  Weight: 179 5 lb  BP: 125/ 83 mmHg    Indications: Atrial Flutter    Diagnoses: I48 1 - Atrial flutter    Sonographer:  Virgil Gunter RDCS  Primary Physician:  Yue Rich MD  Referring Physician:  Francoise Whitney PA-C  Group:  [facility]  Interpreting Physician:  Ирина Park MD    SUMMARY    LEFT VENTRICLE:  Systolic function was at the lower limits of normal  Ejection fraction was estimated to be 53 %  There were no regional wall motion abnormalities  RIGHT VENTRICLE:  The ventricle was mildly dilated  Systolic function was normal     RIGHT ATRIUM:  The atrium was mildly dilated  HISTORY: PRIOR HISTORY: hypertension    PROCEDURE: The procedure was performed at the bedside  This was a routine study  The transthoracic approach was used  The study included complete 2D imaging, M-mode, complete spectral Doppler, and color Doppler  The heart rate was 116 bpm,  at the start of the study  Images were obtained from the parasternal, apical, subcostal, and suprasternal notch acoustic windows  Image quality was adequate  LEFT VENTRICLE: Size was normal  Systolic function was at the lower limits of normal  Ejection fraction was estimated to be 53 %  There were no regional wall motion abnormalities  Wall thickness was normal  DOPPLER: Transmitral flow  pattern: atrial fibrillation  RIGHT VENTRICLE: The ventricle was mildly dilated  Systolic function was normal  Wall thickness was normal     LEFT ATRIUM: Size was normal     RIGHT ATRIUM: The atrium was mildly dilated  MITRAL VALVE: Valve structure was normal  There was normal leaflet separation  DOPPLER: The transmitral velocity was within the normal range  There was no evidence for stenosis  There was no significant regurgitation  AORTIC VALVE: The valve was trileaflet  Leaflets exhibited normal thickness and normal cuspal separation  DOPPLER: Transaortic velocity was within the normal range  There was no evidence for stenosis  There was no significant  regurgitation  TRICUSPID VALVE: The valve structure was normal  There was normal leaflet separation  DOPPLER: The transtricuspid velocity was within the normal range  There was no evidence for stenosis  There was no significant regurgitation  PULMONIC VALVE: Leaflets exhibited normal thickness, no calcification, and normal cuspal separation  DOPPLER: The transpulmonic velocity was within the normal range  There was no significant regurgitation  PERICARDIUM: There was no pericardial effusion  The pericardium was normal in appearance  AORTA: The root exhibited normal size  SYSTEMIC VEINS: IVC: The inferior vena cava was normal in size  SYSTEM MEASUREMENT TABLES    2D  %FS: 28 57 %  AV Diam: 3 14 cm  EDV(Teich): 82 2 ml  EF(Teich): 55 38 %  ESV(Teich): 36 68 ml  IVSd: 0 89 cm  LA Area: 17 04 cm2  LA Diam: 3 83 cm  LVEDV MOD A4C: 72 9 ml  LVEF MOD A4C: 62 23 %  LVESV MOD A4C: 27 53 ml  LVIDd: 4 28 cm  LVIDs: 3 06 cm  LVLd A4C: 7 12 cm  LVLs A4C: 5 71 cm  LVPWd: 0 9 cm  RA Area: 21 54 cm2  RVIDd: 4 02 cm  SV MOD A4C: 45 37 ml  SV(Teich): 45 53 ml    CW  TR MaxP 56 mmHg  TR Vmax: 2 15 m/s    MM  TAPSE: 2 08 cm    [facility]redited [facility]ch[facility]    Prepared and electronically signed by    Jf Knott MD  Signed 18-Oct-2018 15:40:38       No results found for this or any previous visit  No results found for this or any previous visit  No results found for this or any previous visit          I reviewed and interpreted the following LABS/EKG/TELE/IMAGING and below is summary of my interpretation (if data available):    LABS:normal renal function    Current EKG and Rhythm Strip:typical flutter w rvr  rbbb    Past EKGs and RHYTHM strip: Flutter w rvr 10/17/18 and rbbb

## 2018-10-25 NOTE — PROGRESS NOTES
HEART AND VASCULAR  CARDIAC [address]    Outpatient New Consult   Today's Date: 10/25/18        Patient name: Ana Berger  YOB: 1950  Sex: male         Chief Complaint: Referral from DR Randall Strickland for Atrial flutter    ASSESSMENT:  Problem List Items Addressed This Visit     Typical atrial flutter (HCC)    RBBB      Other Visit Diagnoses     Atrial fibrillation, unspecified type [facility])    -  Primary    Relevant Orders    POCT ECG          77 yo male  1) Typical aflutter w RVR found incidentally in routine physical at PCP  HR 120s, was asymptomatic, actually able to walk about  4 miles recently  No edemta, no cp, no palpitations  EF remains low normal 53%  Mild RV dilation and LUBA  No valve disease  Started on TOprol XL 100mg and ELiquis  [id number]=0 (Age and HTN)  2) HTN-urgency had /114 on admission  3) He does snore but no daytime fatigue or other symptoms of PRUDENCIO  Denies heavy ETOH use  PLAN:  1  Recommend aflutter ablation for typical flutter  CAM since not anticoagulated for 3 weeks yet  2  Continue Eliquis for at least one month post op  3  Recommend >4 week cardiac monitoring to guide anticoagualtion and cardiac medications given high probability of afib or other arrhythmias in patients who have flutter  4  Will consider PRUDENCIO screening in future      Risks, benefits and alternatives to ablation of Supraventricular Tachycardia (and/or atrial flutter) were discussed  Alternative options of medical therapy and observation discussed  Treatment success is estimated 95% but may vary depending on the exact mechanism of arrhythmia that cannot be determined until during procedure and repeat ablation maybe required  Risks include but are not limited to heart and vascular injury, bleeding, bleeding around the heart, need for a pacemakerand rarely risk of open heart surgery/stroke/death        Orders Placed This Encounter   Procedures  POCT ECG     There are no discontinued medications    HPI/Subjective:       77 yo male  1) Typical aflutter w RVR found incidentally in routine physical at PCP  HR 120s, was asymptomatic, actually able to walk about  4 miles recently  No edemta, no cp, no palpitations  EF remains low normal 53%  Mild RV dilation and LUBA  No valve disease  Started on TOprol XL 100mg and ELiquis  [id number]=6 (Age and HTN)  2) HTN-urgency had /114 on admission  3) He does snore but no daytime fatigue or other symptoms of PRUDENCIO  Denies heavy ETOH use  He is doing well overall  DOes not like taking medications, all of which are new since dx of flutter  Completely asymptomatic and able to walk long distance without issue  Please note HPI is listed by problem with with update following it, it is copied again in the assessment above and reflects medical decision making as well  Complete 12 point ROS reviewed and otherwise non pertinent or negative except as per HPI pertinent positives in Cardiovascular and Respiratory emphasized  Please see paper chart for outpatient clinic patients where the patient completed the 12 point ROS survey  No past medical history on file  No Known Allergies  I reviewed the Home Medication list and Allergies in the chart     Scheduled Meds:  Current Outpatient Prescriptions   Medication Sig Dispense Refill    apixaban (ELIQUIS) 5 mg Take 1 tablet (5 mg total) by mouth 2 (two) times a day 60 tablet 0    diltiazem (CARDIZEM CD) 240 mg 24 hr capsule Take 1 capsule (240 mg total) by mouth daily 30 capsule 0    Flaxseed, Linseed, (FLAXSEED OIL) 1000 MG CAPS Take 1,300 mg by mouth 2 (two) times a day      metoprolol succinate (TOPROL-XL) 100 mg 24 hr tablet Take 1 tablet (100 mg total) by mouth daily 30 tablet 0    Testosterone Cypionate 200 MG/ML KIT Inject 200 mg/mL into a muscle every 30 (thirty) days       No current facility-administered medications for this visit  PRN Meds:  No family history on file  Social History     Social History    Marital status: /Civil Union     Spouse name: N/A    Number of children: N/A    Years of education: N/A     Occupational History    Not on file  Social History Main Topics    Smoking status: Former Smoker    Smokeless tobacco: Never Used    Alcohol use No    Drug use: No    Sexual activity: Not on file     Other Topics Concern    Not on file     Social History Narrative    No narrative on file         OBJECTIVE:    /68 (BP Location: Right arm, Patient Position: Sitting, Cuff Size: Standard)   Pulse 97   Ht 5' 8" (1 727 m)   Wt 81 8 kg (180 lb 6 4 oz)   BMI 27 43 kg/m²   Vitals:    10/25/18 0832   Weight: 81 8 kg (180 lb 6 4 oz)     GEN: No acute distress, Alert and oriented, well appearing  HEENT:Head, neck, ears, oral pharynx: Mucus membranes moist, oral pharynx clear, nares clear  External ears normal  EYES: Pupils equal, sclera anicteric, midline, normal conjuctiva  NECK: No JVD, supple, no obvious masses or thryomegaly or goiter  CARDIOVASCULAR: tachycardic RRR, No murmur, rub, gallops S1,S2  LUNGS: Clear To auscultation bilaterally, normal effort, no rales, rhonchi, crackles  ABDOMEN:  nondistended,  without obvious organomegaly or ascites  EXTREMITIES/VASCULAR:  No edema  Radial pulses intact, pedal pulses difficult to palpate, warm an well perfused  PSYCH: Normal Affect, no overt suicidal ideation, linear speech pattern without evidence of psychosis  NEURO: Grossly intact, moving all extremiteis equal, face symmetric, alert and responsive, no obvious focal defecits  GAIT:  Ambulates normally without difficulty  HEME: No bleeding, bruising, petechia, purpura  SKIN: No significant rashes, warm, no diaphoresis or pallor       Lab Results:       LABS:      Chemistry        Component Value Date/Time     10/18/2018 0520    K 4 2 10/18/2018 0520     10/18/2018 0520    CO2 24 10/18/2018 0520    BUN 17 10/18/2018 0520    CREATININE 1 10 10/18/2018 0520        Component Value Date/Time    CALCIUM 8 9 10/18/2018 0520    ALKPHOS 77 10/17/2018 1842    AST 20 10/17/2018 184    ALT 36 10/17/2018 184            No results found for: CHOL  No results found for: HDL  No results found for: LDLCALC  No results found for: TRIG  No components found for: CHOLHDL    IMAGING: Xr Chest 1 View Portable    Result Date: 10/18/2018  Narrative: CHEST INDICATION:   sob  COMPARISON:  None EXAM PERFORMED/VIEWS:  XR CHEST PORTABLE FINDINGS: Cardiomediastinal silhouette appears unremarkable  The lungs are clear  No pneumothorax or pleural effusion  Osseous structures appear within normal limits for patient age  Impression: No acute cardiopulmonary disease  Workstation performed: [id number]        Cardiac testing:   Results for orders placed during the hospital encounter of 10/17/18   Echo complete with contrast if indicated    Narrative [place] [address], [address] [place] [address]  [phone]    Transthoracic Echocardiogram  2D, M-mode, Doppler, and Color Doppler    Study date:  18-Oct-2018    Patient: Yancy Smith  MR number: [id number]  Account number: [de-identified]  : 1950  Age: 76 years  Gender: Male  Status: Outpatient  Location: Bedside  Height: 68 in  Weight: 179 5 lb  BP: 125/ 83 mmHg    Indications: Atrial Flutter    Diagnoses: I48 1 - Atrial flutter    Sonographer:  Neyda Hernandez RDCS  Primary Physician:  Omid Javier MD  Referring Physician:  Natalee Ryan PA-C  Group:  [facility]  Interpreting Physician:  Chelsea Jara MD    SUMMARY    LEFT VENTRICLE:  Systolic function was at the lower limits of normal  Ejection fraction was estimated to be 53 %  There were no regional wall motion abnormalities      RIGHT VENTRICLE:  The ventricle was mildly dilated  Systolic function was normal     RIGHT ATRIUM:  The atrium was mildly dilated  HISTORY: PRIOR HISTORY: hypertension    PROCEDURE: The procedure was performed at the bedside  This was a routine study  The transthoracic approach was used  The study included complete 2D imaging, M-mode, complete spectral Doppler, and color Doppler  The heart rate was 116 bpm,  at the start of the study  Images were obtained from the parasternal, apical, subcostal, and suprasternal notch acoustic windows  Image quality was adequate  LEFT VENTRICLE: Size was normal  Systolic function was at the lower limits of normal  Ejection fraction was estimated to be 53 %  There were no regional wall motion abnormalities  Wall thickness was normal  DOPPLER: Transmitral flow  pattern: atrial fibrillation  RIGHT VENTRICLE: The ventricle was mildly dilated  Systolic function was normal  Wall thickness was normal     LEFT ATRIUM: Size was normal     RIGHT ATRIUM: The atrium was mildly dilated  MITRAL VALVE: Valve structure was normal  There was normal leaflet separation  DOPPLER: The transmitral velocity was within the normal range  There was no evidence for stenosis  There was no significant regurgitation  AORTIC VALVE: The valve was trileaflet  Leaflets exhibited normal thickness and normal cuspal separation  DOPPLER: Transaortic velocity was within the normal range  There was no evidence for stenosis  There was no significant  regurgitation  TRICUSPID VALVE: The valve structure was normal  There was normal leaflet separation  DOPPLER: The transtricuspid velocity was within the normal range  There was no evidence for stenosis  There was no significant regurgitation  PULMONIC VALVE: Leaflets exhibited normal thickness, no calcification, and normal cuspal separation  DOPPLER: The transpulmonic velocity was within the normal range  There was no significant regurgitation  PERICARDIUM: There was no pericardial effusion  The pericardium was normal in appearance  AORTA: The root exhibited normal size  SYSTEMIC VEINS: IVC: The inferior vena cava was normal in size  SYSTEM MEASUREMENT TABLES    2D  %FS: 28 57 %  AV Diam: 3 14 cm  EDV(Teich): 82 2 ml  EF(Teich): 55 38 %  ESV(Teich): 36 68 ml  IVSd: 0 89 cm  LA Area: 17 04 cm2  LA Diam: 3 83 cm  LVEDV MOD A4C: 72 9 ml  LVEF MOD A4C: 62 23 %  LVESV MOD A4C: 27 53 ml  LVIDd: 4 28 cm  LVIDs: 3 06 cm  LVLd A4C: 7 12 cm  LVLs A4C: 5 71 cm  LVPWd: 0 9 cm  RA Area: 21 54 cm2  RVIDd: 4 02 cm  SV MOD A4C: 45 37 ml  SV(Teich): 45 53 ml    CW  TR MaxP 56 mmHg  TR Vmax: 2 15 m/s    MM  TAPSE: 2 08 cm    [facility]    Prepared and electronically signed by    Torrey Boles MD  Signed 18-Oct-2018 15:40:38       No results found for this or any previous visit  No results found for this or any previous visit  No results found for this or any previous visit          I reviewed and interpreted the following LABS/EKG/TELE/IMAGING and below is summary of my interpretation (if data available):    LABS:normal renal function    Current EKG and Rhythm Strip:typical flutter w rvr  rbbb    Past EKGs and RHYTHM strip: Flutter w rvr 10/17/18 and rbbb

## 2018-11-07 ENCOUNTER — TELEPHONE (OUTPATIENT)
Dept: SURGERY | Facility: HOSPITAL | Age: 68
End: 2018-11-07

## 2018-11-08 ENCOUNTER — ANESTHESIA EVENT (OUTPATIENT)
Dept: NON INVASIVE DIAGNOSTICS | Facility: HOSPITAL | Age: 68
End: 2018-11-08
Payer: MEDICARE

## 2018-11-08 ENCOUNTER — HOSPITAL ENCOUNTER (OUTPATIENT)
Dept: NON INVASIVE DIAGNOSTICS | Facility: HOSPITAL | Age: 68
Discharge: HOME | End: 2018-11-08
Attending: INTERNAL MEDICINE | Admitting: INTERNAL MEDICINE
Payer: MEDICARE

## 2018-11-08 ENCOUNTER — APPOINTMENT (OUTPATIENT)
Dept: NON INVASIVE DIAGNOSTICS | Facility: HOSPITAL | Age: 68
End: 2018-11-08
Attending: INTERNAL MEDICINE
Payer: MEDICARE

## 2018-11-08 ENCOUNTER — ANESTHESIA (OUTPATIENT)
Dept: NON INVASIVE DIAGNOSTICS | Facility: HOSPITAL | Age: 68
End: 2018-11-08
Payer: MEDICARE

## 2018-11-08 VITALS
SYSTOLIC BLOOD PRESSURE: 104 MMHG | RESPIRATION RATE: 16 BRPM | DIASTOLIC BLOOD PRESSURE: 61 MMHG | TEMPERATURE: 98.6 F | HEART RATE: 115 BPM | HEIGHT: 68 IN | WEIGHT: 180.34 LBS | BODY MASS INDEX: 27.33 KG/M2 | OXYGEN SATURATION: 93 %

## 2018-11-08 DIAGNOSIS — I48.92 ATRIAL FLUTTER, UNSPECIFIED TYPE (HCC): ICD-10-CM

## 2018-11-08 LAB
ANION GAP SERPL CALCULATED.3IONS-SCNC: 7 MMOL/L (ref 4–13)
ATRIAL RATE: 102 BPM
ATRIAL RATE: 294 BPM
BASOPHILS # BLD AUTO: 0.04 THOUSANDS/ΜL (ref 0–0.1)
BASOPHILS NFR BLD AUTO: 1 % (ref 0–1)
BUN SERPL-MCNC: 15 MG/DL (ref 5–25)
CALCIUM SERPL-MCNC: 9 MG/DL (ref 8.3–10.1)
CHLORIDE SERPL-SCNC: 107 MMOL/L (ref 100–108)
CO2 SERPL-SCNC: 25 MMOL/L (ref 21–32)
CREAT SERPL-MCNC: 1.19 MG/DL (ref 0.6–1.3)
EOSINOPHIL # BLD AUTO: 0.22 THOUSAND/ΜL (ref 0–0.61)
EOSINOPHIL NFR BLD AUTO: 3 % (ref 0–6)
ERYTHROCYTE [DISTWIDTH] IN BLOOD BY AUTOMATED COUNT: 12.5 % (ref 11.6–15.1)
GFR SERPL CREATININE-BSD FRML MDRD: 62 ML/MIN/1.73SQ M
GLUCOSE P FAST SERPL-MCNC: 119 MG/DL (ref 65–99)
GLUCOSE SERPL-MCNC: 119 MG/DL (ref 65–140)
HCT VFR BLD AUTO: 52.2 % (ref 36.5–49.3)
HGB BLD-MCNC: 17.6 G/DL (ref 12–17)
IMM GRANULOCYTES # BLD AUTO: 0.03 THOUSAND/UL (ref 0–0.2)
IMM GRANULOCYTES NFR BLD AUTO: 0 % (ref 0–2)
INR PPP: 1.08 (ref 0.86–1.17)
LYMPHOCYTES # BLD AUTO: 1.86 THOUSANDS/ΜL (ref 0.6–4.47)
LYMPHOCYTES NFR BLD AUTO: 25 % (ref 14–44)
MCH RBC QN AUTO: 30.6 PG (ref 26.8–34.3)
MCHC RBC AUTO-ENTMCNC: 33.7 G/DL (ref 31.4–37.4)
MCV RBC AUTO: 91 FL (ref 82–98)
MONOCYTES # BLD AUTO: 0.71 THOUSAND/ΜL (ref 0.17–1.22)
MONOCYTES NFR BLD AUTO: 9 % (ref 4–12)
NEUTROPHILS # BLD AUTO: 4.66 THOUSANDS/ΜL (ref 1.85–7.62)
NEUTS SEG NFR BLD AUTO: 62 % (ref 43–75)
NRBC BLD AUTO-RTO: 0 /100 WBCS
P AXIS: 256 DEGREES
P AXIS: 69 DEGREES
PLATELET # BLD AUTO: 194 THOUSANDS/UL (ref 149–390)
PMV BLD AUTO: 9.5 FL (ref 8.9–12.7)
POTASSIUM SERPL-SCNC: 3.8 MMOL/L (ref 3.5–5.3)
PR INTERVAL: 163 MS
PROTHROMBIN TIME: 14.1 SECONDS (ref 11.8–14.2)
QRS AXIS: 78 DEGREES
QRS AXIS: 78 DEGREES
QRSD INTERVAL: 144 MS
QRSD INTERVAL: 146 MS
QT INTERVAL: 342 MS
QT INTERVAL: 370 MS
QTC INTERVAL: 446 MS
QTC INTERVAL: 493 MS
RBC # BLD AUTO: 5.75 MILLION/UL (ref 3.88–5.62)
SODIUM SERPL-SCNC: 139 MMOL/L (ref 136–145)
T WAVE AXIS: -10 DEGREES
T WAVE AXIS: 39 DEGREES
VENTRICULAR RATE: 102 BPM
VENTRICULAR RATE: 107 BPM
WBC # BLD AUTO: 7.52 THOUSAND/UL (ref 4.31–10.16)

## 2018-11-08 PROCEDURE — C1764 EVENT RECORDER, CARDIAC: HCPCS

## 2018-11-08 PROCEDURE — C1731 CATH, EP, 20 OR MORE ELEC: HCPCS | Performed by: INTERNAL MEDICINE

## 2018-11-08 PROCEDURE — 93653 COMPRE EP EVAL TX SVT: CPT | Performed by: INTERNAL MEDICINE

## 2018-11-08 PROCEDURE — 93312 ECHO TRANSESOPHAGEAL: CPT

## 2018-11-08 PROCEDURE — 93010 ELECTROCARDIOGRAM REPORT: CPT | Performed by: INTERNAL MEDICINE

## 2018-11-08 PROCEDURE — 93005 ELECTROCARDIOGRAM TRACING: CPT

## 2018-11-08 PROCEDURE — 93621 COMP EP EVL L PAC&REC C SINS: CPT | Performed by: INTERNAL MEDICINE

## 2018-11-08 PROCEDURE — C1894 INTRO/SHEATH, NON-LASER: HCPCS | Performed by: INTERNAL MEDICINE

## 2018-11-08 PROCEDURE — 93325 DOPPLER ECHO COLOR FLOW MAPG: CPT | Performed by: INTERNAL MEDICINE

## 2018-11-08 PROCEDURE — C2630 CATH, EP, COOL-TIP: HCPCS | Performed by: INTERNAL MEDICINE

## 2018-11-08 PROCEDURE — 93613 INTRACARDIAC EPHYS 3D MAPG: CPT | Performed by: INTERNAL MEDICINE

## 2018-11-08 PROCEDURE — 33282 HB IMPLANT PAT-ACTIVE HT RECORD: CPT | Performed by: INTERNAL MEDICINE

## 2018-11-08 PROCEDURE — 93312 ECHO TRANSESOPHAGEAL: CPT | Performed by: INTERNAL MEDICINE

## 2018-11-08 PROCEDURE — 85025 COMPLETE CBC W/AUTO DIFF WBC: CPT | Performed by: PHYSICIAN ASSISTANT

## 2018-11-08 PROCEDURE — 1123F ACP DISCUSS/DSCN MKR DOCD: CPT | Performed by: INTERNAL MEDICINE

## 2018-11-08 PROCEDURE — 85610 PROTHROMBIN TIME: CPT | Performed by: PHYSICIAN ASSISTANT

## 2018-11-08 PROCEDURE — 33282 PR IMPLANTATION PT-ACTIVATED CARDIAC EVENT RECORDER: CPT | Performed by: INTERNAL MEDICINE

## 2018-11-08 PROCEDURE — C1730 CATH, EP, 19 OR FEW ELECT: HCPCS | Performed by: INTERNAL MEDICINE

## 2018-11-08 PROCEDURE — 93321 DOPPLER ECHO F-UP/LMTD STD: CPT | Performed by: INTERNAL MEDICINE

## 2018-11-08 PROCEDURE — C1893 INTRO/SHEATH, FIXED,NON-PEEL: HCPCS | Performed by: INTERNAL MEDICINE

## 2018-11-08 PROCEDURE — 80048 BASIC METABOLIC PNL TOTAL CA: CPT | Performed by: PHYSICIAN ASSISTANT

## 2018-11-08 RX ORDER — FENTANYL CITRATE/PF 50 MCG/ML
25 SYRINGE (ML) INJECTION
Status: DISCONTINUED | OUTPATIENT
Start: 2018-11-08 | End: 2018-11-08 | Stop reason: HOSPADM

## 2018-11-08 RX ORDER — GLYCOPYRROLATE 0.2 MG/ML
INJECTION INTRAMUSCULAR; INTRAVENOUS AS NEEDED
Status: DISCONTINUED | OUTPATIENT
Start: 2018-11-08 | End: 2018-11-08 | Stop reason: SURG

## 2018-11-08 RX ORDER — SODIUM CHLORIDE 9 MG/ML
INJECTION, SOLUTION INTRAVENOUS CONTINUOUS PRN
Status: DISCONTINUED | OUTPATIENT
Start: 2018-11-08 | End: 2018-11-08 | Stop reason: SURG

## 2018-11-08 RX ORDER — LABETALOL HYDROCHLORIDE 5 MG/ML
INJECTION, SOLUTION INTRAVENOUS AS NEEDED
Status: DISCONTINUED | OUTPATIENT
Start: 2018-11-08 | End: 2018-11-08 | Stop reason: SURG

## 2018-11-08 RX ORDER — SODIUM CHLORIDE 9 MG/ML
75 INJECTION, SOLUTION INTRAVENOUS CONTINUOUS
Status: DISCONTINUED | OUTPATIENT
Start: 2018-11-08 | End: 2018-11-08 | Stop reason: HOSPADM

## 2018-11-08 RX ORDER — ACETAMINOPHEN 325 MG/1
650 TABLET ORAL EVERY 4 HOURS PRN
Status: DISCONTINUED | OUTPATIENT
Start: 2018-11-08 | End: 2018-11-08 | Stop reason: HOSPADM

## 2018-11-08 RX ORDER — FENTANYL CITRATE 50 UG/ML
INJECTION, SOLUTION INTRAMUSCULAR; INTRAVENOUS AS NEEDED
Status: DISCONTINUED | OUTPATIENT
Start: 2018-11-08 | End: 2018-11-08 | Stop reason: SURG

## 2018-11-08 RX ORDER — MIDAZOLAM HYDROCHLORIDE 1 MG/ML
INJECTION INTRAMUSCULAR; INTRAVENOUS AS NEEDED
Status: DISCONTINUED | OUTPATIENT
Start: 2018-11-08 | End: 2018-11-08 | Stop reason: SURG

## 2018-11-08 RX ORDER — ONDANSETRON 2 MG/ML
4 INJECTION INTRAMUSCULAR; INTRAVENOUS ONCE AS NEEDED
Status: DISCONTINUED | OUTPATIENT
Start: 2018-11-08 | End: 2018-11-08 | Stop reason: HOSPADM

## 2018-11-08 RX ORDER — ESMOLOL HYDROCHLORIDE 10 MG/ML
INJECTION INTRAVENOUS AS NEEDED
Status: DISCONTINUED | OUTPATIENT
Start: 2018-11-08 | End: 2018-11-08 | Stop reason: SURG

## 2018-11-08 RX ORDER — ROCURONIUM BROMIDE 10 MG/ML
INJECTION, SOLUTION INTRAVENOUS AS NEEDED
Status: DISCONTINUED | OUTPATIENT
Start: 2018-11-08 | End: 2018-11-08 | Stop reason: SURG

## 2018-11-08 RX ORDER — LIDOCAINE HYDROCHLORIDE AND EPINEPHRINE 10; 10 MG/ML; UG/ML
INJECTION, SOLUTION INFILTRATION; PERINEURAL CODE/TRAUMA/SEDATION MEDICATION
Status: COMPLETED | OUTPATIENT
Start: 2018-11-08 | End: 2018-11-08

## 2018-11-08 RX ADMIN — LABETALOL HYDROCHLORIDE 5 MG: 5 INJECTION, SOLUTION INTRAVENOUS at 08:18

## 2018-11-08 RX ADMIN — FENTANYL CITRATE 50 MCG: 50 INJECTION, SOLUTION INTRAMUSCULAR; INTRAVENOUS at 09:35

## 2018-11-08 RX ADMIN — ESMOLOL HYDROCHLORIDE 100 MG: 100 INJECTION, SOLUTION INTRAVENOUS at 08:13

## 2018-11-08 RX ADMIN — Medication 2 UNITS: at 08:51

## 2018-11-08 RX ADMIN — MIDAZOLAM 2 MG: 1 INJECTION INTRAMUSCULAR; INTRAVENOUS at 08:05

## 2018-11-08 RX ADMIN — Medication 2 UNITS: at 10:06

## 2018-11-08 RX ADMIN — NEOSTIGMINE METHYLSULFATE 3 MG: 1 INJECTION, SOLUTION INTRAMUSCULAR; INTRAVENOUS; SUBCUTANEOUS at 09:25

## 2018-11-08 RX ADMIN — Medication 2 UNITS: at 09:55

## 2018-11-08 RX ADMIN — Medication 2 UNITS: at 08:56

## 2018-11-08 RX ADMIN — SODIUM CHLORIDE: 0.9 INJECTION, SOLUTION INTRAVENOUS at 08:05

## 2018-11-08 RX ADMIN — SODIUM CHLORIDE: 0.9 INJECTION, SOLUTION INTRAVENOUS at 09:25

## 2018-11-08 RX ADMIN — ROCURONIUM BROMIDE 40 MG: 10 INJECTION INTRAVENOUS at 08:12

## 2018-11-08 RX ADMIN — GLYCOPYRROLATE 0.4 MG: 0.2 INJECTION, SOLUTION INTRAMUSCULAR; INTRAVENOUS at 09:25

## 2018-11-08 RX ADMIN — PHENYLEPHRINE HYDROCHLORIDE 100 MCG/MIN: 10 INJECTION INTRAVENOUS at 09:09

## 2018-11-08 RX ADMIN — FENTANYL CITRATE 50 MCG: 50 INJECTION, SOLUTION INTRAMUSCULAR; INTRAVENOUS at 08:41

## 2018-11-08 RX ADMIN — LABETALOL HYDROCHLORIDE 10 MG: 5 INJECTION, SOLUTION INTRAVENOUS at 08:19

## 2018-11-08 RX ADMIN — Medication 2 UNITS: at 09:05

## 2018-11-08 RX ADMIN — LABETALOL HYDROCHLORIDE 5 MG: 5 INJECTION, SOLUTION INTRAVENOUS at 08:24

## 2018-11-08 RX ADMIN — LIDOCAINE HYDROCHLORIDE AND EPINEPHRINE 15 ML: 10; 10 INJECTION, SOLUTION INFILTRATION; PERINEURAL at 10:04

## 2018-11-08 NOTE — DISCHARGE INSTRUCTIONS
Please discontinue diltiazem, and continue Toprol XL as previously instructed  Please monitor your blood pressure at home, and contact your family doctor or our office if it is consistently elevated OR is running too low  No heavy lifting or strenuous activity for one week  No soaking in a bath tub/hot tub/swimming pool for one week or until groin heals  If you notice ongoing bleeding, swelling, or firm lumps in groin near ablation incision, please contact Dr Donell Graves office - [phone]  Keep loop recorder incision dry for one week  Do not use lotions/powders/creams on incision  Remove outer bandage 48 hours after procedure - if present, leave underlying steri-strips in place, they will either fall off on their own or will be removed at 2 week follow up appointment  Please call the office [phone] if you notice redness, swelling, bleeding, or drainage from incision or if you develop fevers  Cardiac Loop Recorder Insertion      WHAT YOU SHOULD KNOW:    A cardiac loop recorder is a device used to diagnose heart rhythm problems, such as a fast or irregular heartbeat  It is implanted in your left chest, just under the skin  The device records a pattern of your heart's rhythm, called an EKG  Your device records automatic EKGs, depending on how your caregiver programs it  You may also receive a handheld controller  You press a button on the controller when you have symptoms, such as dizziness, lightheadedness, or palpitations  The device will record an EKG at that moment  The recording can help your caregiver see if your symptoms may be caused by heart rhythm problems  Your caregiver will remove the device after it has collected enough data  You may need the device for up to 3 years  The procedure to remove the device is similar to the procedure used to implant it       AFTER YOU LEAVE:    Follow up with your cardiologist as directed: You will need to return in 1 to 2 weeks   Your cardiologist will check your incision  He may also program your device settings again  He will retrieve data from the device every 1 to 3 months with a monitor held over your skin  You may be able to transmit data from your device from home as well  You will do this by calling a number provided by your cardiologist, or as they have instructed you  Ask for information about this process  Write down your questions so you remember to ask them during your visits       Wound care: Keep loop recorder incision dry for one week  Do not use lotions/powders/creams on incision  Remove outer bandage 48 hours after procedure - leave underlying steri-strips in place, they will either fall off on their own or will be removed at 2 week follow up appointment  Please call the office if you notice redness, swelling, bleeding, or drainage from incision or if you develop fevers  After that first week, carefully wash your incision with soap and water  Keep the area clean and dry until it heals       Return to activity: If you received anesthesia, you will not be able to drive for 24 hours  Otherwise, most people can return to normal activities soon after the procedure  Your cardiologist may want to know if your work involves electrical current or high-voltage equipment  Ask about other electrical items that could interfere with your cardiac loop recorder       Contact your cardiologist if:   · You have a fever or chills  · Your wound is red, swollen, or draining pus  · You have questions or concerns about your condition or care  Seek care immediately or call 911 if:   · You feel weak, dizzy, or faint  · You lose consciousness  © 2014 [employer] is for End User's use only and may not be sold, redistributed or otherwise used for commercial purposes  All illustrations and images included in CareNotes® are the copyrighted property of [employer]  or Anjel Ochoa      The above information is an  only  It is not intended as medical advice for individual conditions or treatments  Talk to your doctor, nurse or pharmacist before following any medical regimen to see if it is safe and effective for you

## 2018-11-08 NOTE — H&P (VIEW-ONLY)
HEART AND VASCULAR  CARDIAC [address]    Outpatient New Consult   Today's Date: 10/25/18        Patient name: Gurpreet Scales  YOB: 1950  Sex: male         Chief Complaint: Referral from DR Nevaeh Green for Atrial flutter    ASSESSMENT:  Problem List Items Addressed This Visit     Typical atrial flutter (HCC)    RBBB      Other Visit Diagnoses     Atrial fibrillation, unspecified type [facility])    -  Primary    Relevant Orders    POCT ECG          77 yo male  1) Typical aflutter w RVR found incidentally in routine physical at PCP  HR 120s, was asymptomatic, actually able to walk about  4 miles recently  No edemta, no cp, no palpitations  EF remains low normal 53%  Mild RV dilation and LUBA  No valve disease  Started on TOprol XL 100mg and ELiquis  [id number]=8 (Age and HTN)  2) HTN-urgency had /114 on admission  3) He does snore but no daytime fatigue or other symptoms of PRUDENCIO  Denies heavy ETOH use  PLAN:  1  Recommend aflutter ablation for typical flutter  CAM since not anticoagulated for 3 weeks yet  2  Continue Eliquis for at least one month post op  3  Recommend >4 week cardiac monitoring to guide anticoagualtion and cardiac medications given high probability of afib or other arrhythmias in patients who have flutter  4  Will consider PRUDENCIO screening in future      Risks, benefits and alternatives to ablation of Supraventricular Tachycardia (and/or atrial flutter) were discussed  Alternative options of medical therapy and observation discussed  Treatment success is estimated 95% but may vary depending on the exact mechanism of arrhythmia that cannot be determined until during procedure and repeat ablation maybe required  Risks include but are not limited to heart and vascular injury, bleeding, bleeding around the heart, need for a pacemakerand rarely risk of open heart surgery/stroke/death        Orders Placed This Encounter   Procedures  POCT ECG     There are no discontinued medications    HPI/Subjective:       77 yo male  1) Typical aflutter w RVR found incidentally in routine physical at PCP  HR 120s, was asymptomatic, actually able to walk about  4 miles recently  No edemta, no cp, no palpitations  EF remains low normal 53%  Mild RV dilation and LUBA  No valve disease  Started on TOprol XL 100mg and ELiquis  [id number]=5 (Age and HTN)  2) HTN-urgency had /114 on admission  3) He does snore but no daytime fatigue or other symptoms of PRUDENCIO  Denies heavy ETOH use  He is doing well overall  DOes not like taking medications, all of which are new since dx of flutter  Completely asymptomatic and able to walk long distance without issue  Please note HPI is listed by problem with with update following it, it is copied again in the assessment above and reflects medical decision making as well  Complete 12 point ROS reviewed and otherwise non pertinent or negative except as per HPI pertinent positives in Cardiovascular and Respiratory emphasized  Please see paper chart for outpatient clinic patients where the patient completed the 12 point ROS survey  No past medical history on file  No Known Allergies  I reviewed the Home Medication list and Allergies in the chart     Scheduled Meds:  Current Outpatient Prescriptions   Medication Sig Dispense Refill    apixaban (ELIQUIS) 5 mg Take 1 tablet (5 mg total) by mouth 2 (two) times a day 60 tablet 0    diltiazem (CARDIZEM CD) 240 mg 24 hr capsule Take 1 capsule (240 mg total) by mouth daily 30 capsule 0    Flaxseed, Linseed, (FLAXSEED OIL) 1000 MG CAPS Take 1,300 mg by mouth 2 (two) times a day      metoprolol succinate (TOPROL-XL) 100 mg 24 hr tablet Take 1 tablet (100 mg total) by mouth daily 30 tablet 0    Testosterone Cypionate 200 MG/ML KIT Inject 200 mg/mL into a muscle every 30 (thirty) days       No current facility-administered medications for this visit  PRN Meds:  No family history on file  Social History     Social History    Marital status: /Civil Union     Spouse name: N/A    Number of children: N/A    Years of education: N/A     Occupational History    Not on file  Social History Main Topics    Smoking status: Former Smoker    Smokeless tobacco: Never Used    Alcohol use No    Drug use: No    Sexual activity: Not on file     Other Topics Concern    Not on file     Social History Narrative    No narrative on file         OBJECTIVE:    /68 (BP Location: Right arm, Patient Position: Sitting, Cuff Size: Standard)   Pulse 97   Ht 5' 8" (1 727 m)   Wt 81 8 kg (180 lb 6 4 oz)   BMI 27 43 kg/m²   Vitals:    10/25/18 0832   Weight: 81 8 kg (180 lb 6 4 oz)     GEN: No acute distress, Alert and oriented, well appearing  HEENT:Head, neck, ears, oral pharynx: Mucus membranes moist, oral pharynx clear, nares clear  External ears normal  EYES: Pupils equal, sclera anicteric, midline, normal conjuctiva  NECK: No JVD, supple, no obvious masses or thryomegaly or goiter  CARDIOVASCULAR: tachycardic RRR, No murmur, rub, gallops S1,S2  LUNGS: Clear To auscultation bilaterally, normal effort, no rales, rhonchi, crackles  ABDOMEN:  nondistended,  without obvious organomegaly or ascites  EXTREMITIES/VASCULAR:  No edema  Radial pulses intact, pedal pulses difficult to palpate, warm an well perfused  PSYCH: Normal Affect, no overt suicidal ideation, linear speech pattern without evidence of psychosis  NEURO: Grossly intact, moving all extremiteis equal, face symmetric, alert and responsive, no obvious focal defecits  GAIT:  Ambulates normally without difficulty  HEME: No bleeding, bruising, petechia, purpura  SKIN: No significant rashes, warm, no diaphoresis or pallor       Lab Results:       LABS:      Chemistry        Component Value Date/Time     10/18/2018 0520    K 4 2 10/18/2018 0520     10/18/2018 0520    CO2 24 10/18/2018 0520    BUN 17 10/18/2018 0520    CREATININE 1 10 10/18/2018 0520        Component Value Date/Time    CALCIUM 8 9 10/18/2018 0520    ALKPHOS 77 10/17/2018 1842    AST 20 10/17/2018 184    ALT 36 10/17/2018 184            No results found for: CHOL  No results found for: HDL  No results found for: LDLCALC  No results found for: TRIG  No components found for: CHOLHDL    IMAGING: Xr Chest 1 View Portable    Result Date: 10/18/2018  Narrative: CHEST INDICATION:   sob  COMPARISON:  None EXAM PERFORMED/VIEWS:  XR CHEST PORTABLE FINDINGS: Cardiomediastinal silhouette appears unremarkable  The lungs are clear  No pneumothorax or pleural effusion  Osseous structures appear within normal limits for patient age  Impression: No acute cardiopulmonary disease  Workstation performed: [id number]        Cardiac testing:   Results for orders placed during the hospital encounter of 10/17/18   Echo complete with contrast if indicated    Narrative [place] [id number], [address] [place] [address]  [phone]    Transthoracic Echocardiogram  2D, M-mode, Doppler, and Color Doppler    Study date:  18-Oct-2018    Patient: Wendi Hyde  MR number: [id number]  Account number: [de-identified]  : 1950  Age: 76 years  Gender: Male  Status: Outpatient  Location: Bedside  Height: 68 in  Weight: 179 5 lb  BP: 125/ 83 mmHg    Indications: Atrial Flutter    Diagnoses: I48 1 - Atrial flutter    Sonographer:  Herminia Momin RDCS  Primary Physician:  Joy Cruz MD  Referring Physician:  Jacqueline Marc PA-C  Group:  [facility]  Interpreting Physician:  Keith Frank MD    SUMMARY    LEFT VENTRICLE:  Systolic function was at the lower limits of normal  Ejection fraction was estimated to be 53 %  There were no regional wall motion abnormalities      RIGHT VENTRICLE:  The ventricle was mildly dilated  Systolic function was normal     RIGHT ATRIUM:  The atrium was mildly dilated  HISTORY: PRIOR HISTORY: hypertension    PROCEDURE: The procedure was performed at the bedside  This was a routine study  The transthoracic approach was used  The study included complete 2D imaging, M-mode, complete spectral Doppler, and color Doppler  The heart rate was 116 bpm,  at the start of the study  Images were obtained from the parasternal, apical, subcostal, and suprasternal notch acoustic windows  Image quality was adequate  LEFT VENTRICLE: Size was normal  Systolic function was at the lower limits of normal  Ejection fraction was estimated to be 53 %  There were no regional wall motion abnormalities  Wall thickness was normal  DOPPLER: Transmitral flow  pattern: atrial fibrillation  RIGHT VENTRICLE: The ventricle was mildly dilated  Systolic function was normal  Wall thickness was normal     LEFT ATRIUM: Size was normal     RIGHT ATRIUM: The atrium was mildly dilated  MITRAL VALVE: Valve structure was normal  There was normal leaflet separation  DOPPLER: The transmitral velocity was within the normal range  There was no evidence for stenosis  There was no significant regurgitation  AORTIC VALVE: The valve was trileaflet  Leaflets exhibited normal thickness and normal cuspal separation  DOPPLER: Transaortic velocity was within the normal range  There was no evidence for stenosis  There was no significant  regurgitation  TRICUSPID VALVE: The valve structure was normal  There was normal leaflet separation  DOPPLER: The transtricuspid velocity was within the normal range  There was no evidence for stenosis  There was no significant regurgitation  PULMONIC VALVE: Leaflets exhibited normal thickness, no calcification, and normal cuspal separation  DOPPLER: The transpulmonic velocity was within the normal range  There was no significant regurgitation  PERICARDIUM: There was no pericardial effusion  The pericardium was normal in appearance  AORTA: The root exhibited normal size  SYSTEMIC VEINS: IVC: The inferior vena cava was normal in size  SYSTEM MEASUREMENT TABLES    2D  %FS: 28 57 %  AV Diam: 3 14 cm  EDV(Teich): 82 2 ml  EF(Teich): 55 38 %  ESV(Teich): 36 68 ml  IVSd: 0 89 cm  LA Area: 17 04 cm2  LA Diam: 3 83 cm  LVEDV MOD A4C: 72 9 ml  LVEF MOD A4C: 62 23 %  LVESV MOD A4C: 27 53 ml  LVIDd: 4 28 cm  LVIDs: 3 06 cm  LVLd A4C: 7 12 cm  LVLs A4C: 5 71 cm  LVPWd: 0 9 cm  RA Area: 21 54 cm2  RVIDd: 4 02 cm  SV MOD A4C: 45 37 ml  SV(Teich): 45 53 ml    CW  TR MaxP 56 mmHg  TR Vmax: 2 15 m/s    MM  TAPSE: 2 08 cm    [facility]    Prepared and electronically signed by    Jovani Reich MD  Signed 18-Oct-2018 15:40:38       No results found for this or any previous visit  No results found for this or any previous visit  No results found for this or any previous visit          I reviewed and interpreted the following LABS/EKG/TELE/IMAGING and below is summary of my interpretation (if data available):    LABS:normal renal function    Current EKG and Rhythm Strip:typical flutter w rvr  rbbb    Past EKGs and RHYTHM strip: Flutter w rvr 10/17/18 and rbbb

## 2018-11-08 NOTE — PROGRESS NOTES
Site to bilateral groins clean dry and intact no bleeding mid chest dressing clean dry and intact patient has no complaints

## 2018-11-08 NOTE — ANESTHESIA PREPROCEDURE EVALUATION
Review of Systems/Medical History  Patient summary reviewed        Cardiovascular  Dysrhythmias , atrial flutter,    Pulmonary       GI/Hepatic            Endo/Other     GYN       Hematology   Musculoskeletal       Neurology   Psychology           Physical Exam    Airway    Mallampati score: II  TM Distance: >3 FB  Neck ROM: full     Dental       Cardiovascular      Pulmonary      Other Findings        Anesthesia Plan  ASA Score- 2     Anesthesia Type- general with ASA Monitors  Additional Monitors:   Airway Plan: ETT  Plan Factors-    Induction- intravenous  Postoperative Plan-     Informed Consent- Anesthetic plan and risks discussed with patient  I personally reviewed this patient with the CRNA  Discussed and agreed on the Anesthesia Plan with the RACHELLE Amaya

## 2018-11-08 NOTE — ANESTHESIA POSTPROCEDURE EVALUATION
Post-Op Assessment Note      CV Status:  Stable    Mental Status:  Alert and awake    Hydration Status:  Euvolemic    PONV Controlled:  Controlled    Airway Patency:  Patent    Post Op Vitals Reviewed: Yes          Staff: Anesthesiologist, CRNA           BP   112/74         Pulse 105   Resp  20   SpO2   95

## 2018-11-08 NOTE — INTERVAL H&P NOTE
Please refer to Dr Gamaliel Negrete office note for full details  Briefly this patient is a 51-year-old male with a history of hypertension who was incidentally found to have atrial flutter at routine follow-up with his PCP  He has largely been asymptomatic  He was seen in consultation by Dr Siddharth Tinajero, who recommended an ablation for his typical atrial flutter  As he has been asymptomatic, a loop recorder was also recommended to rule out asymptomatic atrial fibrillation moving forward  EKG this morning shows he is still in typical atrial flutter with variable block  No significant changes since he was last seen, accept physical exam as above

## 2018-11-16 DIAGNOSIS — I48.3 TYPICAL ATRIAL FLUTTER (HCC): ICD-10-CM

## 2018-11-16 RX ORDER — TESTOSTERONE CYPIONATE 200 MG/ML
INJECTION INTRAMUSCULAR
Refills: 5 | COMMUNITY
Start: 2018-10-20 | End: 2022-03-22 | Stop reason: ALTCHOICE

## 2018-11-16 RX ORDER — METOPROLOL SUCCINATE 100 MG/1
100 TABLET, EXTENDED RELEASE ORAL DAILY
Qty: 30 TABLET | Refills: 11 | Status: SHIPPED | OUTPATIENT
Start: 2018-11-16 | End: 2019-11-11 | Stop reason: SDUPTHER

## 2018-11-16 RX ORDER — ALBUTEROL SULFATE 90 UG/1
2 AEROSOL, METERED RESPIRATORY (INHALATION) EVERY 6 HOURS PRN
COMMUNITY
Start: 2016-03-31

## 2018-11-16 RX ORDER — TADALAFIL 20 MG/1
TABLET ORAL
COMMUNITY
End: 2021-01-21 | Stop reason: SDUPTHER

## 2018-11-23 ENCOUNTER — IN-CLINIC DEVICE VISIT (OUTPATIENT)
Dept: CARDIOLOGY CLINIC | Facility: CLINIC | Age: 68
End: 2018-11-23

## 2018-11-23 DIAGNOSIS — Z95.818 PRESENCE OF OTHER CARDIAC IMPLANTS AND GRAFTS: ICD-10-CM

## 2018-11-23 DIAGNOSIS — I45.10 RBBB: ICD-10-CM

## 2018-11-23 DIAGNOSIS — I48.3 TYPICAL ATRIAL FLUTTER (HCC): Primary | ICD-10-CM

## 2018-11-23 PROCEDURE — 99024 POSTOP FOLLOW-UP VISIT: CPT | Performed by: INTERNAL MEDICINE

## 2018-11-23 NOTE — PROGRESS NOTES
Results for orders placed or performed in visit on 11/23/18   Cardiac EP device report    Narrative    MDT ILR  DEVICE INTERROGATED IN THE [place] OFFICE (LOOP)  WOUND CHECK: INCISION CLEAN AND DRY WITH EDGES APPROXIMATED; (1) SUTURE REMOVED; WOUND CARE AND RESTRICTIONS REVIEWED WITH PATIENT  BATTERY VOLTAGE "OK"  PRESENTING - NSR @ 68 BPM  R WVS MEASURE @ 0 15 mV  NO PATIENT ACTIVATED EPISODES  1 DEVICE "TACHY" EPISODE WITH ECG SHOWING "NOISE"  NO PROGRAMMING CHANGES MADE TO DEVICE PARAMETERS  NORMAL DEVICE FUNCTION    eb

## 2018-12-11 ENCOUNTER — APPOINTMENT (OUTPATIENT)
Dept: LAB | Facility: CLINIC | Age: 68
End: 2018-12-11
Payer: MEDICARE

## 2018-12-11 ENCOUNTER — TRANSCRIBE ORDERS (OUTPATIENT)
Dept: LAB | Facility: CLINIC | Age: 68
End: 2018-12-11

## 2018-12-11 DIAGNOSIS — M54.5 LOW BACK PAIN, UNSPECIFIED BACK PAIN LATERALITY, UNSPECIFIED CHRONICITY, WITH SCIATICA PRESENCE UNSPECIFIED: ICD-10-CM

## 2018-12-11 DIAGNOSIS — R00.0 TACHYCARDIA, UNSPECIFIED: ICD-10-CM

## 2018-12-11 DIAGNOSIS — E78.5 HYPERLIPIDEMIA, UNSPECIFIED HYPERLIPIDEMIA TYPE: Primary | ICD-10-CM

## 2018-12-11 DIAGNOSIS — G89.29 CHRONIC IDIOPATHIC ANAL PAIN: ICD-10-CM

## 2018-12-11 DIAGNOSIS — Z12.5 SPECIAL SCREENING FOR MALIGNANT NEOPLASM OF PROSTATE: ICD-10-CM

## 2018-12-11 DIAGNOSIS — K62.89 CHRONIC IDIOPATHIC ANAL PAIN: ICD-10-CM

## 2018-12-11 LAB
ALBUMIN SERPL BCP-MCNC: 4.1 G/DL (ref 3.5–5)
ALP SERPL-CCNC: 68 U/L (ref 46–116)
ALT SERPL W P-5'-P-CCNC: 33 U/L (ref 12–78)
ANION GAP SERPL CALCULATED.3IONS-SCNC: 4 MMOL/L (ref 4–13)
AST SERPL W P-5'-P-CCNC: 15 U/L (ref 5–45)
BASOPHILS # BLD AUTO: 0.07 THOUSANDS/ΜL (ref 0–0.1)
BASOPHILS NFR BLD AUTO: 1 % (ref 0–1)
BILIRUB SERPL-MCNC: 0.75 MG/DL (ref 0.2–1)
BILIRUB UR QL STRIP: NEGATIVE
BUN SERPL-MCNC: 15 MG/DL (ref 5–25)
CALCIUM SERPL-MCNC: 9.7 MG/DL (ref 8.3–10.1)
CHLORIDE SERPL-SCNC: 103 MMOL/L (ref 100–108)
CHOLEST SERPL-MCNC: 188 MG/DL (ref 50–200)
CLARITY UR: CLEAR
CO2 SERPL-SCNC: 29 MMOL/L (ref 21–32)
COLOR UR: YELLOW
CREAT SERPL-MCNC: 1.09 MG/DL (ref 0.6–1.3)
EOSINOPHIL # BLD AUTO: 0.26 THOUSAND/ΜL (ref 0–0.61)
EOSINOPHIL NFR BLD AUTO: 4 % (ref 0–6)
ERYTHROCYTE [DISTWIDTH] IN BLOOD BY AUTOMATED COUNT: 13 % (ref 11.6–15.1)
GFR SERPL CREATININE-BSD FRML MDRD: 69 ML/MIN/1.73SQ M
GLUCOSE P FAST SERPL-MCNC: 111 MG/DL (ref 65–99)
GLUCOSE UR STRIP-MCNC: NEGATIVE MG/DL
HCT VFR BLD AUTO: 49.8 % (ref 36.5–49.3)
HDLC SERPL-MCNC: 34 MG/DL (ref 40–60)
HGB BLD-MCNC: 16.3 G/DL (ref 12–17)
HGB UR QL STRIP.AUTO: NEGATIVE
IMM GRANULOCYTES # BLD AUTO: 0.02 THOUSAND/UL (ref 0–0.2)
IMM GRANULOCYTES NFR BLD AUTO: 0 % (ref 0–2)
KETONES UR STRIP-MCNC: NEGATIVE MG/DL
LDLC SERPL CALC-MCNC: 104 MG/DL (ref 0–100)
LEUKOCYTE ESTERASE UR QL STRIP: NEGATIVE
LYMPHOCYTES # BLD AUTO: 1.94 THOUSANDS/ΜL (ref 0.6–4.47)
LYMPHOCYTES NFR BLD AUTO: 29 % (ref 14–44)
MCH RBC QN AUTO: 30.5 PG (ref 26.8–34.3)
MCHC RBC AUTO-ENTMCNC: 32.7 G/DL (ref 31.4–37.4)
MCV RBC AUTO: 93 FL (ref 82–98)
MONOCYTES # BLD AUTO: 0.66 THOUSAND/ΜL (ref 0.17–1.22)
MONOCYTES NFR BLD AUTO: 10 % (ref 4–12)
NEUTROPHILS # BLD AUTO: 3.77 THOUSANDS/ΜL (ref 1.85–7.62)
NEUTS SEG NFR BLD AUTO: 56 % (ref 43–75)
NITRITE UR QL STRIP: NEGATIVE
NONHDLC SERPL-MCNC: 154 MG/DL
NRBC BLD AUTO-RTO: 0 /100 WBCS
PH UR STRIP.AUTO: 6 [PH] (ref 4.5–8)
PLATELET # BLD AUTO: 181 THOUSANDS/UL (ref 149–390)
PMV BLD AUTO: 9 FL (ref 8.9–12.7)
POTASSIUM SERPL-SCNC: 4.2 MMOL/L (ref 3.5–5.3)
PROT SERPL-MCNC: 7.5 G/DL (ref 6.4–8.2)
PROT UR STRIP-MCNC: NEGATIVE MG/DL
PSA SERPL-MCNC: 0.8 NG/ML (ref 0–4)
RBC # BLD AUTO: 5.34 MILLION/UL (ref 3.88–5.62)
SODIUM SERPL-SCNC: 136 MMOL/L (ref 136–145)
SP GR UR STRIP.AUTO: 1.01 (ref 1–1.03)
TRIGL SERPL-MCNC: 251 MG/DL
TSH SERPL DL<=0.05 MIU/L-ACNC: 1.83 UIU/ML (ref 0.36–3.74)
UROBILINOGEN UR QL STRIP.AUTO: 0.2 E.U./DL
WBC # BLD AUTO: 6.72 THOUSAND/UL (ref 4.31–10.16)

## 2018-12-11 PROCEDURE — 85025 COMPLETE CBC W/AUTO DIFF WBC: CPT

## 2018-12-11 PROCEDURE — G0103 PSA SCREENING: HCPCS

## 2018-12-11 PROCEDURE — 81003 URINALYSIS AUTO W/O SCOPE: CPT

## 2018-12-11 PROCEDURE — 36415 COLL VENOUS BLD VENIPUNCTURE: CPT

## 2018-12-11 PROCEDURE — 80053 COMPREHEN METABOLIC PANEL: CPT

## 2018-12-11 PROCEDURE — 84443 ASSAY THYROID STIM HORMONE: CPT

## 2018-12-11 PROCEDURE — 80061 LIPID PANEL: CPT

## 2018-12-13 ENCOUNTER — OFFICE VISIT (OUTPATIENT)
Dept: CARDIOLOGY CLINIC | Facility: CLINIC | Age: 68
End: 2018-12-13
Payer: MEDICARE

## 2018-12-13 VITALS
HEART RATE: 82 BPM | DIASTOLIC BLOOD PRESSURE: 80 MMHG | RESPIRATION RATE: 16 BRPM | BODY MASS INDEX: 27.38 KG/M2 | SYSTOLIC BLOOD PRESSURE: 132 MMHG | WEIGHT: 180.7 LBS | HEIGHT: 68 IN

## 2018-12-13 DIAGNOSIS — I48.3 TYPICAL ATRIAL FLUTTER (HCC): Primary | ICD-10-CM

## 2018-12-13 PROCEDURE — 99024 POSTOP FOLLOW-UP VISIT: CPT | Performed by: INTERNAL MEDICINE

## 2018-12-13 PROCEDURE — 93000 ELECTROCARDIOGRAM COMPLETE: CPT | Performed by: INTERNAL MEDICINE

## 2018-12-13 RX ORDER — FENOFIBRATE 160 MG/1
160 TABLET ORAL
COMMUNITY
Start: 2018-12-12 | End: 2020-01-16 | Stop reason: HOSPADM

## 2018-12-13 RX ORDER — LORATADINE 10 MG/1
10 TABLET ORAL AS NEEDED
COMMUNITY

## 2018-12-13 NOTE — PROGRESS NOTES
HEART AND VASCULAR  CARDIAC [address]    Outpatient New Consult   Today's Date: 12/13/18        Patient name: Yoel Elder  YOB: 1950  Sex: male         Chief Complaint: Referral from DR London Goel for Atrial flutter    ASSESSMENT:  Problem List Items Addressed This Visit     Typical atrial flutter [facility]) - Primary    Relevant Orders    POCT ECG          75 yo male  1) S/p successful ablation for Typical aflutter  He has zero afib/flutter on loop check today  He had aflutter  w RVR found incidentally in routine physical at PCP  HR 120s, was asymptomatic, actually able to walk about  4 miles recently  No edemta, no cp, no palpitations  EF remains low normal 53%  Mild RV dilation and LUBA  No valve disease  Started on TOprol XL 100mg and ELiquis  [id number]=4 (Age and HTN)  2) HTN-urgency had /114 on admission now well controlled  3) He does snore but no daytime fatigue or other symptoms of PRUDENCIO  Denies heavy ETOH use  PLAN:  1  D/c Eliquis  Resume if any afib found on future loop checks  2  COntinue metoprolol for HTN    Orders Placed This Encounter   Procedures    POCT ECG     There are no discontinued medications    HPI/Subjective:       75 yo male  1) S/p successful ablation for Typical aflutter  He has zero afib/flutter on loop check today  He had aflutter  w RVR found incidentally in routine physical at PCP  HR 120s, was asymptomatic, actually able to walk about  4 miles recently  No edemta, no cp, no palpitations  EF remains low normal 53%  Mild RV dilation and LUBA  No valve disease  Started on TOprol XL 100mg and ELiquis  [id number]=0 (Age and HTN)  2) HTN-urgency had /114 on admission now well controlled  3) He does snore but no daytime fatigue or other symptoms of PRUDENCIO  Denies heavy ETOH use      Please note HPI is listed by problem with with update following it, it is copied again in the assessment above and reflects medical decision making as well  Complete 12 point ROS reviewed and otherwise non pertinent or negative except as per HPI pertinent positives in Cardiovascular and Respiratory emphasized  Please see paper chart for outpatient clinic patients where the patient completed the 12 point ROS survey  No past medical history on file  No Known Allergies  I reviewed the Home Medication list and Allergies in the chart  Scheduled Meds:  Current Outpatient Prescriptions   Medication Sig Dispense Refill    albuterol (PROVENTIL HFA,VENTOLIN HFA) 90 mcg/act inhaler Inhale 2 puffs every 6 (six) hours as needed      apixaban (ELIQUIS) 5 mg Take 1 tablet (5 mg total) by mouth 2 (two) times a day 60 tablet 11    Flaxseed, Linseed, (FLAXSEED OIL) 1000 MG CAPS Take 1,300 mg by mouth 2 (two) times a day      loratadine (CLARITIN) 10 mg tablet Take 10 mg by mouth daily      metoprolol succinate (TOPROL-XL) 100 mg 24 hr tablet Take 1 tablet (100 mg total) by mouth daily 30 tablet 11    tadalafil (CIALIS) 20 MG tablet daily as needed   testosterone cypionate (DEPO-TESTOSTERONE) 200 mg/mL SOLN INJECT 1 5 ML EVERY 28 DAYS  5    Testosterone Cypionate 200 MG/ML KIT Inject 200 mg/mL into a muscle every 30 (thirty) days      fenofibrate (TRIGLIDE) 160 MG tablet Take 160 mg by mouth       No current facility-administered medications for this visit  PRN Meds:  No family history on file  Social History     Social History    Marital status: /Civil Union     Spouse name: N/A    Number of children: N/A    Years of education: N/A     Occupational History    Not on file       Social History Main Topics    Smoking status: Former Smoker    Smokeless tobacco: Never Used    Alcohol use No    Drug use: No    Sexual activity: Not on file     Other Topics Concern    Not on file     Social History Narrative    No narrative on file         OBJECTIVE:    /80 (BP Location: Left arm, Patient Position: Sitting, Cuff Size: Standard)   Pulse 82   Resp 16   Ht 5' 8" (1 727 m)   Wt 82 kg (180 lb 11 2 oz)   BMI 27 48 kg/m²   Vitals:    12/13/18 0852   Weight: 82 kg (180 lb 11 2 oz)     GEN: No acute distress, Alert and oriented, well appearing  HEENT:Head, neck, ears, oral pharynx: Mucus membranes moist, oral pharynx clear, nares clear  External ears normal  EYES: Pupils equal, sclera anicteric, midline, normal conjuctiva  NECK: No JVD, supple, no obvious masses or thryomegaly or goiter  CARDIOVASCULAR: RRR, No murmur, rub, gallops S1,S2  LUNGS: Clear To auscultation bilaterally, normal effort, no rales, rhonchi, crackles  ABDOMEN:  nondistended,  without obvious organomegaly or ascites  EXTREMITIES/VASCULAR:  No edema  Radial pulses intact, pedal pulses difficult to palpate, warm an well perfused  PSYCH: Normal Affect, no overt suicidal ideation, linear speech pattern without evidence of psychosis  NEURO: Grossly intact, moving all extremiteis equal, face symmetric, alert and responsive, no obvious focal defecits  GAIT:  Ambulates normally without difficulty  HEME: No bleeding, bruising, petechia, purpura  SKIN: No significant rashes, warm, no diaphoresis or pallor       Lab Results:       LABS:      Chemistry        Component Value Date/Time    K 4 2 12/11/2018 0905     12/11/2018 0905    CO2 29 12/11/2018 0905    BUN 15 12/11/2018 0905    CREATININE 1 09 12/11/2018 0905        Component Value Date/Time    CALCIUM 9 7 12/11/2018 0905    ALKPHOS 68 12/11/2018 0905    AST 15 12/11/2018 0905    ALT 33 12/11/2018 0905            No results found for: CHOL  Lab Results   Component Value Date    HDL 34 (L) 12/11/2018     Lab Results   Component Value Date    LDLCALC 104 (H) 12/11/2018     Lab Results   Component Value Date    TRIG 251 (H) 12/11/2018     No results found for: CHOLHDL    IMAGING: Xr Chest 1 View Portable    Result Date: 10/18/2018  Narrative: CHEST INDICATION:   sob  COMPARISON:  None EXAM PERFORMED/VIEWS:  XR CHEST PORTABLE FINDINGS: Cardiomediastinal silhouette appears unremarkable  The lungs are clear  No pneumothorax or pleural effusion  Osseous structures appear within normal limits for patient age  Impression: No acute cardiopulmonary disease  Workstation performed: [id number]        Cardiac testing:   Results for orders placed during the hospital encounter of 10/17/18   Echo complete with contrast if indicated    Narrative [facility] [facility]  [phone]    Transthoracic Echocardiogram  2D, M-mode, Doppler, and Color Doppler    Study date:  18-Oct-2018    Patient: Denzel Garnica  MR number: [id number]  Account number: [de-identified]  : 1950  Age: 76 years  Gender: Male  Status: Outpatient  Location: Bedside  Height: 68 in  Weight: 179 5 lb  BP: 125/ 83 mmHg    Indications: Atrial Flutter    Diagnoses: I48 1 - Atrial flutter    Sonographer:  Virgil Gunter RDCS  Primary Physician:  Yue Rich MD  Referring Physician:  Francoise Whitney PA-C  Group:  [facility]  Interpreting Physician:  Ирина Park MD    SUMMARY    LEFT VENTRICLE:  Systolic function was at the lower limits of normal  Ejection fraction was estimated to be 53 %  There were no regional wall motion abnormalities  RIGHT VENTRICLE:  The ventricle was mildly dilated  Systolic function was normal     RIGHT ATRIUM:  The atrium was mildly dilated  HISTORY: PRIOR HISTORY: hypertension    PROCEDURE: The procedure was performed at the bedside  This was a routine study  The transthoracic approach was used  The study included complete 2D imaging, M-mode, complete spectral Doppler, and color Doppler  The heart rate was 116 bpm,  at the start of the study   Images were obtained from the parasternal, apical, subcostal, and suprasternal notch acoustic windows  Image quality was adequate  LEFT VENTRICLE: Size was normal  Systolic function was at the lower limits of normal  Ejection fraction was estimated to be 53 %  There were no regional wall motion abnormalities  Wall thickness was normal  DOPPLER: Transmitral flow  pattern: atrial fibrillation  RIGHT VENTRICLE: The ventricle was mildly dilated  Systolic function was normal  Wall thickness was normal     LEFT ATRIUM: Size was normal     RIGHT ATRIUM: The atrium was mildly dilated  MITRAL VALVE: Valve structure was normal  There was normal leaflet separation  DOPPLER: The transmitral velocity was within the normal range  There was no evidence for stenosis  There was no significant regurgitation  AORTIC VALVE: The valve was trileaflet  Leaflets exhibited normal thickness and normal cuspal separation  DOPPLER: Transaortic velocity was within the normal range  There was no evidence for stenosis  There was no significant  regurgitation  TRICUSPID VALVE: The valve structure was normal  There was normal leaflet separation  DOPPLER: The transtricuspid velocity was within the normal range  There was no evidence for stenosis  There was no significant regurgitation  PULMONIC VALVE: Leaflets exhibited normal thickness, no calcification, and normal cuspal separation  DOPPLER: The transpulmonic velocity was within the normal range  There was no significant regurgitation  PERICARDIUM: There was no pericardial effusion  The pericardium was normal in appearance  AORTA: The root exhibited normal size  SYSTEMIC VEINS: IVC: The inferior vena cava was normal in size      SYSTEM MEASUREMENT TABLES    2D  %FS: 28 57 %  AV Diam: 3 14 cm  EDV(Teich): 82 2 ml  EF(Teich): 55 38 %  ESV(Teich): 36 68 ml  IVSd: 0 89 cm  LA Area: 17 04 cm2  LA Diam: 3 83 cm  LVEDV MOD A4C: 72 9 ml  LVEF MOD A4C: 62 23 %  LVESV MOD A4C: 27 53 ml  LVIDd: 4 28 cm  LVIDs: 3 06 cm  LVLd A4C: 7 12 cm  LVLs A4C: 5 71 cm  LVPWd: 0 9 cm  RA Area: 21 54 cm2  RVIDd: 4 02 cm  SV MOD A4C: 45 37 ml  SV(Teich): 45 53 ml    CW  TR MaxP 56 mmHg  TR Vmax: 2 15 m/s    MM  TAPSE: 2 08 cm    [employer] Accredited [facility]    Prepared and electronically signed by    Licha Caputo MD  Signed 18-Oct-2018 15:40:38       No results found for this or any previous visit  No results found for this or any previous visit  No results found for this or any previous visit          I reviewed and interpreted the following LABS/EKG/TELE/IMAGING and below is summary of my interpretation (if data available):    LABS:normal renal function    Current EKG and Rhythm Strip:NSR, RBBB  Past EKGs and RHYTHM strip: Flutter w rvr 10/17/18 and rbbb

## 2019-02-11 ENCOUNTER — TRANSCRIBE ORDERS (OUTPATIENT)
Dept: LAB | Facility: CLINIC | Age: 69
End: 2019-02-11

## 2019-02-11 ENCOUNTER — APPOINTMENT (OUTPATIENT)
Dept: LAB | Facility: CLINIC | Age: 69
End: 2019-02-11
Payer: MEDICARE

## 2019-02-11 DIAGNOSIS — E78.2 MIXED HYPERLIPIDEMIA: Primary | ICD-10-CM

## 2019-02-11 LAB
CHOLEST SERPL-MCNC: 185 MG/DL (ref 50–200)
HDLC SERPL-MCNC: 41 MG/DL (ref 40–60)
LDLC SERPL CALC-MCNC: 115 MG/DL (ref 0–100)
NONHDLC SERPL-MCNC: 144 MG/DL
TRIGL SERPL-MCNC: 145 MG/DL

## 2019-02-11 PROCEDURE — 80061 LIPID PANEL: CPT

## 2019-02-11 PROCEDURE — 36415 COLL VENOUS BLD VENIPUNCTURE: CPT

## 2019-02-26 ENCOUNTER — REMOTE DEVICE CLINIC VISIT (OUTPATIENT)
Dept: CARDIOLOGY CLINIC | Facility: CLINIC | Age: 69
End: 2019-02-26
Payer: MEDICARE

## 2019-02-26 DIAGNOSIS — R00.2 PALPITATIONS: Primary | ICD-10-CM

## 2019-02-26 DIAGNOSIS — Z95.818 PRESENCE OF OTHER CARDIAC IMPLANTS AND GRAFTS: ICD-10-CM

## 2019-02-26 PROCEDURE — 93299 PR REM INTERROG ICPMS/SCRMS <30 D TECH REVIEW: CPT | Performed by: INTERNAL MEDICINE

## 2019-02-26 PROCEDURE — 93298 REM INTERROG DEV EVAL SCRMS: CPT | Performed by: INTERNAL MEDICINE

## 2019-02-26 NOTE — PROGRESS NOTES
MDT ILR  CARELINK TRANSMISSION: LOOP RECORDER  PRESENTING RHYTHM NSR @ 72 BPM  BATTERY STATUS "OK"  1 TACHY EPISODES W/ EGRAM SHOWING OVERSENSING  2 AF EPISODES W/ EGRAMS SUGGESTING SR W/ PACs AND OVERSENSING  HOWEVER CAN NOT R/O AF DUE TO NOISE  PT DOES TAKE ELIQUIS AND METOPROLOL SUCC  AF BURDEN = 0%  NO PATIENT ACTIVATED EPISODES  NORMAL DEVICE FUNCTION   DL

## 2019-03-18 ENCOUNTER — HOSPITAL ENCOUNTER (OUTPATIENT)
Dept: RADIOLOGY | Facility: HOSPITAL | Age: 69
Discharge: HOME | End: 2019-03-18
Payer: MEDICARE

## 2019-03-18 ENCOUNTER — TRANSCRIBE ORDERS (OUTPATIENT)
Dept: ADMINISTRATIVE | Facility: HOSPITAL | Age: 69
End: 2019-03-18

## 2019-03-18 ENCOUNTER — TELEPHONE (OUTPATIENT)
Dept: CARDIOLOGY CLINIC | Facility: CLINIC | Age: 69
End: 2019-03-18

## 2019-03-18 DIAGNOSIS — R05.9 COUGH: Primary | ICD-10-CM

## 2019-03-18 DIAGNOSIS — R05.9 COUGH: ICD-10-CM

## 2019-03-18 PROCEDURE — 71046 X-RAY EXAM CHEST 2 VIEWS: CPT

## 2019-03-18 NOTE — TELEPHONE ENCOUNTER
Pt called, stated he saw his PCP for chest pressure and congestion  Was given antibiotics as they thought it may be bronchitis  Pt has completed the antibiotics but doesn't feel any better  He would like to just sent a transmission from his loop to make sure it isn't something with his heart

## 2019-03-20 NOTE — TELEPHONE ENCOUNTER
Called pt, informed him transmission was normal as per Dr Alfredito Barnett  Chest pressure and congestion not heart related should F/U with PCP

## 2019-05-28 ENCOUNTER — REMOTE DEVICE CLINIC VISIT (OUTPATIENT)
Dept: CARDIOLOGY CLINIC | Facility: CLINIC | Age: 69
End: 2019-05-28
Payer: MEDICARE

## 2019-05-28 DIAGNOSIS — Z95.818 PRESENCE OF OTHER CARDIAC IMPLANTS AND GRAFTS: ICD-10-CM

## 2019-05-28 DIAGNOSIS — R00.2 PALPITATIONS: ICD-10-CM

## 2019-05-28 DIAGNOSIS — I48.0 PAROXYSMAL ATRIAL FIBRILLATION (HCC): Primary | ICD-10-CM

## 2019-05-28 PROCEDURE — 93298 REM INTERROG DEV EVAL SCRMS: CPT | Performed by: INTERNAL MEDICINE

## 2019-05-28 PROCEDURE — 93299 PR REM INTERROG ICPMS/SCRMS <30 D TECH REVIEW: CPT | Performed by: INTERNAL MEDICINE

## 2019-06-05 ENCOUNTER — DOCUMENTATION (OUTPATIENT)
Dept: OTHER | Facility: HOSPITAL | Age: 69
End: 2019-06-05

## 2019-06-05 ENCOUNTER — OFFICE VISIT (OUTPATIENT)
Dept: CARDIOLOGY CLINIC | Facility: CLINIC | Age: 69
End: 2019-06-05
Payer: MEDICARE

## 2019-06-05 VITALS
HEIGHT: 68 IN | BODY MASS INDEX: 27.51 KG/M2 | WEIGHT: 181.5 LBS | HEART RATE: 64 BPM | DIASTOLIC BLOOD PRESSURE: 82 MMHG | SYSTOLIC BLOOD PRESSURE: 140 MMHG

## 2019-06-05 DIAGNOSIS — I48.3 TYPICAL ATRIAL FLUTTER (HCC): Primary | ICD-10-CM

## 2019-06-05 DIAGNOSIS — I10 ESSENTIAL HYPERTENSION: ICD-10-CM

## 2019-06-05 PROCEDURE — 99214 OFFICE O/P EST MOD 30 MIN: CPT | Performed by: PHYSICIAN ASSISTANT

## 2019-06-05 PROCEDURE — 1123F ACP DISCUSS/DSCN MKR DOCD: CPT | Performed by: PHYSICIAN ASSISTANT

## 2019-06-05 PROCEDURE — 93000 ELECTROCARDIOGRAM COMPLETE: CPT | Performed by: PHYSICIAN ASSISTANT

## 2019-08-02 ENCOUNTER — APPOINTMENT (OUTPATIENT)
Dept: LAB | Facility: CLINIC | Age: 69
End: 2019-08-02
Payer: MEDICARE

## 2019-08-02 ENCOUNTER — TRANSCRIBE ORDERS (OUTPATIENT)
Dept: LAB | Facility: CLINIC | Age: 69
End: 2019-08-02

## 2019-08-02 DIAGNOSIS — R79.89 LOW TESTOSTERONE: ICD-10-CM

## 2019-08-02 DIAGNOSIS — Z79.899 HIGH RISK MEDICATION USE: ICD-10-CM

## 2019-08-02 DIAGNOSIS — Z79.899 ENCOUNTER FOR LONG-TERM (CURRENT) USE OF OTHER MEDICATIONS: Primary | ICD-10-CM

## 2019-08-02 LAB
ALBUMIN SERPL BCP-MCNC: 4.5 G/DL (ref 3.5–5)
ALP SERPL-CCNC: 57 U/L (ref 46–116)
ALT SERPL W P-5'-P-CCNC: 28 U/L (ref 12–78)
ANION GAP SERPL CALCULATED.3IONS-SCNC: 5 MMOL/L (ref 4–13)
AST SERPL W P-5'-P-CCNC: 20 U/L (ref 5–45)
BASOPHILS # BLD AUTO: 0.08 THOUSANDS/ΜL (ref 0–0.1)
BASOPHILS NFR BLD AUTO: 1 % (ref 0–1)
BILIRUB SERPL-MCNC: 0.79 MG/DL (ref 0.2–1)
BUN SERPL-MCNC: 23 MG/DL (ref 5–25)
CALCIUM SERPL-MCNC: 8.8 MG/DL (ref 8.3–10.1)
CHLORIDE SERPL-SCNC: 108 MMOL/L (ref 100–108)
CO2 SERPL-SCNC: 25 MMOL/L (ref 21–32)
CREAT SERPL-MCNC: 1.17 MG/DL (ref 0.6–1.3)
EOSINOPHIL # BLD AUTO: 0.33 THOUSAND/ΜL (ref 0–0.61)
EOSINOPHIL NFR BLD AUTO: 6 % (ref 0–6)
ERYTHROCYTE [DISTWIDTH] IN BLOOD BY AUTOMATED COUNT: 11.9 % (ref 11.6–15.1)
GFR SERPL CREATININE-BSD FRML MDRD: 63 ML/MIN/1.73SQ M
GLUCOSE P FAST SERPL-MCNC: 107 MG/DL (ref 65–99)
HCT VFR BLD AUTO: 47.8 % (ref 36.5–49.3)
HGB BLD-MCNC: 15.9 G/DL (ref 12–17)
IMM GRANULOCYTES # BLD AUTO: 0.03 THOUSAND/UL (ref 0–0.2)
IMM GRANULOCYTES NFR BLD AUTO: 1 % (ref 0–2)
LYMPHOCYTES # BLD AUTO: 2.09 THOUSANDS/ΜL (ref 0.6–4.47)
LYMPHOCYTES NFR BLD AUTO: 36 % (ref 14–44)
MCH RBC QN AUTO: 30.6 PG (ref 26.8–34.3)
MCHC RBC AUTO-ENTMCNC: 33.3 G/DL (ref 31.4–37.4)
MCV RBC AUTO: 92 FL (ref 82–98)
MONOCYTES # BLD AUTO: 0.57 THOUSAND/ΜL (ref 0.17–1.22)
MONOCYTES NFR BLD AUTO: 10 % (ref 4–12)
NEUTROPHILS # BLD AUTO: 2.69 THOUSANDS/ΜL (ref 1.85–7.62)
NEUTS SEG NFR BLD AUTO: 46 % (ref 43–75)
NRBC BLD AUTO-RTO: 0 /100 WBCS
PLATELET # BLD AUTO: 212 THOUSANDS/UL (ref 149–390)
PMV BLD AUTO: 10 FL (ref 8.9–12.7)
POTASSIUM SERPL-SCNC: 4 MMOL/L (ref 3.5–5.3)
PROT SERPL-MCNC: 7.2 G/DL (ref 6.4–8.2)
PSA SERPL-MCNC: 0.6 NG/ML (ref 0–4)
RBC # BLD AUTO: 5.19 MILLION/UL (ref 3.88–5.62)
SODIUM SERPL-SCNC: 138 MMOL/L (ref 136–145)
WBC # BLD AUTO: 5.79 THOUSAND/UL (ref 4.31–10.16)

## 2019-08-02 PROCEDURE — 84153 ASSAY OF PSA TOTAL: CPT

## 2019-08-02 PROCEDURE — 80053 COMPREHEN METABOLIC PANEL: CPT

## 2019-08-02 PROCEDURE — 36415 COLL VENOUS BLD VENIPUNCTURE: CPT

## 2019-08-02 PROCEDURE — 85025 COMPLETE CBC W/AUTO DIFF WBC: CPT

## 2019-08-27 ENCOUNTER — REMOTE DEVICE CLINIC VISIT (OUTPATIENT)
Dept: CARDIOLOGY CLINIC | Facility: CLINIC | Age: 69
End: 2019-08-27
Payer: MEDICARE

## 2019-08-27 DIAGNOSIS — I47.9 PAROXYSMAL TACHYCARDIA (HCC): ICD-10-CM

## 2019-08-27 DIAGNOSIS — Z95.818 PRESENCE OF OTHER CARDIAC IMPLANTS AND GRAFTS: ICD-10-CM

## 2019-08-27 DIAGNOSIS — R00.2 PALPITATIONS: Primary | ICD-10-CM

## 2019-08-27 PROCEDURE — 93299 PR REM INTERROG ICPMS/SCRMS <30 D TECH REVIEW: CPT | Performed by: INTERNAL MEDICINE

## 2019-08-27 PROCEDURE — 93298 REM INTERROG DEV EVAL SCRMS: CPT | Performed by: INTERNAL MEDICINE

## 2019-08-27 NOTE — PROGRESS NOTES
MDT ILR  CARELINK TRANSMISSION: LOOP RECORDER  PRESENTING RHYTHM NSR @ 66 BPM  BATTERY STATUS "OK"  2 TACHY EPISODES W/ EGRAMS SHOWING SR W/ OVERSENSING  NO PATIENT ACTIVATED EPISODES  NORMAL DEVICE FUNCTION   DL

## 2019-10-14 NOTE — PROGRESS NOTES
Electrophysiology Office Follow Up  [address] [place] [address] Cardiology [facility]  [address] [facility], [facility] [address]     Name: Driss Quinonez  : 1950  MRN: [id number]    ASSESSMENT:  1  Typical atrial flutter ([address] )     2  RBBB         PLAN:  1  Typical atrial flutter  - not on anticoagulation currently / CHADS2 Vasc score of 2 (had been on Eliquis post ablation)  - EF of 53% per echocardiogram 10/18 / CAM prior to flutter ablation showed an EF of 55-65%  - status post radiofrequency ablation of CTI dependent flutter by Dr Lara Garcia on 2018  2  Loop recorder in situ  - implanted on 2018  3  Hypertension  4  Right bundle branch block    IMPRESSION:  1  Atrial flutter - Patient is doing well from arrhythmias standpoint  I interrogated the loop in the office and also had Dr Lara Garcia view it - there were some episodes that just appeared to be either artifact or PACs  He is not currently maintained on anticoagulation the had no further arrhythmias  Patient presented today as follow-up to the [employer] Precision Study  2  Hypertension - His blood pressure in the office today is excellent  Patient reports 89 been checking his blood pressures at home anymore given the fact that they were very well controlled  Would continue metoprolol succinate 100 mg daily for this  3  RBBB - Mr Newton Salcido does have a bundle branch block but is currently asymptomatic with no evidence of heart failure symptoms - very stable  I did not order an echocardiogram during this visit, but would if he starts to have symptoms or further arrhythmias  Patient is to follow up in our EP office in 1 year  He is to call our office with any further questions or concerns in the meantime  HPI:   Interim history:  Patient is a 77-year-old male with typical atrial flutter, loop recorder in situ, essential hypertension, and right bundle branch block      He was first seen in consultation by Dr Lara Garcia as he was referred by Dr Sivan Sims for management of atrial flutter  This was first found in a routine physical by his primary care doctor  At that time, recommendation had been for ablation and loop recorder implantation to monitor to further he was other than flutter  Patient underwent radiofrequency ablation of CTI dependent flutter by Dr Maria Isabel Bright on 11/8/2018 along with loop recorder implantation  He tolerated the procedure well and subsequent follow-ups, his Eliquis had been discontinued due to no further arrhythmias  He continue to be maintained on metoprolol succinate given his hypertension  Patient is enrolled in the [employer] Precision study  Patient reports that he feels great  He denies any chest pain, palpitations, lightheadedness, dizziness, or syncope  Patient use to work as an , 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  He retired a couple years ago  He continues to remain active  He does lot of yd work outside, having just built his daughter a need checked recently  EKG:  Sinus rhythm at 66 beats per minute, incomplete right bundle branch block with QRS complex of 112 milliseconds, normal axis, no evidence of VA for QT prolongation    ROS:   ROS as noted above, otherwise 12 point review of systems was performed and is negative  OBJECTIVE:   Vitals: There were no vitals taken for this visit  There is no height or weight on file to calculate BMI        Physical Exam:   GEN: Kajal Meza appears well, alert and oriented x 3, pleasant and cooperative   HEENT: pupils equal, round, and reactive to light; extraocular muscles intact  NECK: supple, no carotid bruits   HEART: regular rhythm, normal S1 and S2, no murmurs, clicks, gallops or rubs   LUNGS: clear to auscultation bilaterally; no wheezes, rales, or rhonchi   ABDOMEN: normal bowel sounds, soft, no tenderness, no distention  EXTREMITIES: peripheral pulses normal; no clubbing, cyanosis, or edema  NEURO: no focal findings   SKIN: normal without suspicious lesions on exposed skin    Medications:      Current Outpatient Medications:     albuterol (PROVENTIL HFA,VENTOLIN HFA) 90 mcg/act inhaler, Inhale 2 puffs every 6 (six) hours as needed, Disp: , Rfl:     aspirin 81 MG tablet, Take 81 mg by mouth daily, Disp: , Rfl:     fenofibrate (TRIGLIDE) 160 MG tablet, Take 160 mg by mouth, Disp: , Rfl:     Flaxseed, Linseed, (FLAXSEED OIL) 1000 MG CAPS, Take 1,300 mg by mouth 2 (two) times a day, Disp: , Rfl:     loratadine (CLARITIN) 10 mg tablet, Take 10 mg by mouth as needed , Disp: , Rfl:     metoprolol succinate (TOPROL-XL) 100 mg 24 hr tablet, Take 1 tablet (100 mg total) by mouth daily, Disp: 30 tablet, Rfl: 11    tadalafil (CIALIS) 20 MG tablet, daily as needed  , Disp: , Rfl:     testosterone cypionate (DEPO-TESTOSTERONE) 200 mg/mL SOLN, INJECT 1 5 ML EVERY 28 DAYS, Disp: , Rfl: 5    Testosterone Cypionate 200 MG/ML KIT, Inject 200 mg/mL into a muscle every 30 (thirty) days, Disp: , Rfl:      No family history on file    Social History     Socioeconomic History    Marital status: /Civil Union     Spouse name: Not on file    Number of children: Not on file    Years of education: Not on file    Highest education level: Not on file   Occupational History    Not on file   Social Needs    Financial resource strain: Not on file    Food insecurity:     Worry: Not on file     Inability: Not on file    Transportation needs:     Medical: Not on file     Non-medical: Not on file   Tobacco Use    Smoking status: Former Smoker    Smokeless tobacco: Never Used   Substance and Sexual Activity    Alcohol use: No    Drug use: No    Sexual activity: Not on file   Lifestyle    Physical activity:     Days per week: Not on file     Minutes per session: Not on file    Stress: Not on file   Relationships    Social connections:     Talks on phone: Not on file     Gets together: Not on file     Attends Baptism service: Not on file     Active member of club or organization: Not on file     Attends meetings of clubs or organizations: Not on file     Relationship status: Not on file    Intimate partner violence:     Fear of current or ex partner: Not on file     Emotionally abused: Not on file     Physically abused: Not on file     Forced sexual activity: Not on file   Other Topics Concern    Not on file   Social History Narrative    Not on file     Social History     Tobacco Use   Smoking Status Former Smoker   Smokeless Tobacco Never Used     Social History     Substance and Sexual Activity   Alcohol Use No       Labs & Results:  Below is the patient's most recent value for Albumin, ALT, AST, BUN, Calcium, Chloride, Cholesterol, CO2, Creatinine, GFR, Glucose, HDL, Hematocrit, Hemoglobin, Hemoglobin A1C, LDL, Magnesium, Phosphorus, Platelets, Potassium, PSA, Sodium, Triglycerides, and WBC  Lab Results   Component Value Date    ALT 28 2019    AST 20 2019    BUN 23 2019    CALCIUM 8 8 2019     2019    CO2 25 2019    CREATININE 1 17 2019    HDL 41 2019    HCT 47 8 2019    HGB 15 9 2019    MG 2 1 10/18/2018     2019    K 4 0 2019    PSA 0 6 2019    TRIG 145 2019    WBC 5 79 2019     Note: for a comprehensive list of the patient's lab results, access the Results Review activity      CARDIAC TESTING:   ECHO:   Results for orders placed during the hospital encounter of 10/17/18   Echo complete with contrast if indicated    Narrative [place] [address], [address] [place] [address]  [phone]    Transthoracic Echocardiogram  2D, M-mode, Doppler, and Color Doppler    Study date:  18-Oct-2018    Patient: Rosangela Mena  MR number: [id number]  Account number: [de-identified]  : 1950  Age: 76 years  Gender: Male  Status: Outpatient  Location: Bedside  Height: 68 in  Weight: 179 5 lb  BP: 125/ 83 mmHg    Indications: Atrial Flutter    Diagnoses: I48 1 - Atrial flutter    Sonographer:  Jonathan Harden RDCS  Primary Physician:  Brandon Rocha MD  Referring Physician:  Kunal Deras PA-C  Group:  [id number]  Interpreting Physician:  Jayjay Hines MD    SUMMARY    LEFT VENTRICLE:  Systolic function was at the lower limits of normal  Ejection fraction was estimated to be 53 %  There were no regional wall motion abnormalities  RIGHT VENTRICLE:  The ventricle was mildly dilated  Systolic function was normal     RIGHT ATRIUM:  The atrium was mildly dilated  HISTORY: PRIOR HISTORY: hypertension    PROCEDURE: The procedure was performed at the bedside  This was a routine study  The transthoracic approach was used  The study included complete 2D imaging, M-mode, complete spectral Doppler, and color Doppler  The heart rate was 116 bpm,  at the start of the study  Images were obtained from the parasternal, apical, subcostal, and suprasternal notch acoustic windows  Image quality was adequate  LEFT VENTRICLE: Size was normal  Systolic function was at the lower limits of normal  Ejection fraction was estimated to be 53 %  There were no regional wall motion abnormalities  Wall thickness was normal  DOPPLER: Transmitral flow  pattern: atrial fibrillation  RIGHT VENTRICLE: The ventricle was mildly dilated  Systolic function was normal  Wall thickness was normal     LEFT ATRIUM: Size was normal     RIGHT ATRIUM: The atrium was mildly dilated  MITRAL VALVE: Valve structure was normal  There was normal leaflet separation  DOPPLER: The transmitral velocity was within the normal range  There was no evidence for stenosis  There was no significant regurgitation  AORTIC VALVE: The valve was trileaflet  Leaflets exhibited normal thickness and normal cuspal separation  DOPPLER: Transaortic velocity was within the normal range  There was no evidence for stenosis   There was no significant  regurgitation  TRICUSPID VALVE: The valve structure was normal  There was normal leaflet separation  DOPPLER: The transtricuspid velocity was within the normal range  There was no evidence for stenosis  There was no significant regurgitation  PULMONIC VALVE: Leaflets exhibited normal thickness, no calcification, and normal cuspal separation  DOPPLER: The transpulmonic velocity was within the normal range  There was no significant regurgitation  PERICARDIUM: There was no pericardial effusion  The pericardium was normal in appearance  AORTA: The root exhibited normal size  SYSTEMIC VEINS: IVC: The inferior vena cava was normal in size  SYSTEM MEASUREMENT TABLES    2D  %FS: 28 57 %  AV Diam: 3 14 cm  EDV(Teich): 82 2 ml  EF(Teich): 55 38 %  ESV(Teich): 36 68 ml  IVSd: 0 89 cm  LA Area: 17 04 cm2  LA Diam: 3 83 cm  LVEDV MOD A4C: 72 9 ml  LVEF MOD A4C: 62 23 %  LVESV MOD A4C: 27 53 ml  LVIDd: 4 28 cm  LVIDs: 3 06 cm  LVLd A4C: 7 12 cm  LVLs A4C: 5 71 cm  LVPWd: 0 9 cm  RA Area: 21 54 cm2  RVIDd: 4 02 cm  SV MOD A4C: 45 37 ml  SV(Teich): 45 53 ml    CW  TR MaxP 56 mmHg  TR Vmax: 2 15 m/s    MM  TAPSE: 2 08 cm    [facility] Accredited Echocardiography Laboratory    Prepared and electronically signed by    Destin Johnson MD  Signed 18-Oct-2018 15:40:38       Results for orders placed during the hospital encounter of 18   CAM    N[facility]ative [facility]  [address] [place] [address]  [phone]    Transesophageal Echocardiogram  2D, Doppler, and Color Doppler    Study date:  2018    Patient: Tatum Keenan  MR number: [id number]  Account number: [de-identified]  : 1950  Age: 76 years  Gender: Male  Status: Outpatient  Location: Cath lab  Height: 68 in  Weight: 180 lb  BP: 132/ 98 mmHg    Indications: Atrial flutter w/ Ablation      Diagnoses: I48 1 - Atrial flutter    Sonographer:  Kayley Feng RDCS  Primary Physician: Ada Krause MD  Referring Physician:  Ifrah Kahn MD  Group:  [facility]  Interpreting Physician:  Ifrah Kahn MD    SUMMARY    LEFT VENTRICLE:  Systolic function was normal  Ejection fraction was estimated in the range of 55 % to 65 %  LEFT ATRIAL APPENDAGE:  The size was normal   The function was normal (normal emptying velocity)  No thrombus was identified  ATRIAL SEPTUM:  No defect or patent foramen ovale was identified  MITRAL VALVE:  There was mild regurgitation  AORTIC VALVE:  There was mild stenosis  TRICUSPID VALVE:  There was mild regurgitation  HISTORY: PRIOR HISTORY: RBBB  Hypertension  PROCEDURE: The procedure was performed in the catheterization laboratory  This was a routine study  The risks and alternatives of the procedure were explained to the patient and informed consent was obtained  The transesophageal approach  was used  The study included complete 2D imaging, limited spectral Doppler, color Doppler, and probe insertion (without interpretation)  The heart rate was 137 bpm, at the start of the study  An adult omniplane probe was inserted by the  attending cardiologist  Intubated with ease  One intubation attempt(s)  There was no blood detected on the probe  There were no complications during the procedure  MEDICATIONS: Anesthesia administered by anesthesia team     LEFT VENTRICLE: Size was normal  Systolic function was normal  Ejection fraction was estimated in the range of 55 % to 65 %  Wall thickness was normal     RIGHT VENTRICLE: The size was normal  Systolic function was normal  Wall thickness was normal     LEFT ATRIUM: Size was normal  No thrombus was identified  APPENDAGE: The size was normal  No thrombus was identified  DOPPLER: The function was normal (normal emptying velocity)  ATRIAL SEPTUM: No defect or patent foramen ovale was identified  MITRAL VALVE: Valve structure was normal  There was normal leaflet separation  There was no echocardiographic evidence of vegetation  DOPPLER: There was mild regurgitation  AORTIC VALVE: The noncoronary cusp demonstrated mildly increased thickness, mild calcification, and mild nodularity  DOPPLER: There was mild stenosis  TRICUSPID VALVE: The valve structure was normal  There was normal leaflet separation  There was no echocardiographic evidence of vegetation  DOPPLER: There was mild regurgitation  PULMONIC VALVE: Leaflets exhibited normal thickness, no calcification, and normal cuspal separation  There was no echocardiographic evidence of vegetation  PERICARDIUM: There was no pericardial effusion  The pericardium was normal in appearance  AORTA: The root exhibited normal size  There was no atheroma  There was no evidence for dissection  There was no evidence for aneurysm  [address] Accredited Echocardiography Laboratory    Prepared and electronically signed by    Dustin Holland MD  Signed [id number] 09:59:44         CATH:  No results found for this or any previous visit  STRESS TEST:  No results found for this or any previous visit

## 2019-10-15 ENCOUNTER — OFFICE VISIT (OUTPATIENT)
Dept: CARDIOLOGY CLINIC | Facility: CLINIC | Age: 69
End: 2019-10-15
Payer: MEDICARE

## 2019-10-15 VITALS
HEIGHT: 68 IN | HEART RATE: 66 BPM | SYSTOLIC BLOOD PRESSURE: 120 MMHG | WEIGHT: 187 LBS | BODY MASS INDEX: 28.34 KG/M2 | DIASTOLIC BLOOD PRESSURE: 70 MMHG

## 2019-10-15 DIAGNOSIS — I45.10 RBBB: ICD-10-CM

## 2019-10-15 DIAGNOSIS — I48.3 TYPICAL ATRIAL FLUTTER (HCC): Primary | ICD-10-CM

## 2019-10-15 PROCEDURE — 99214 OFFICE O/P EST MOD 30 MIN: CPT | Performed by: PHYSICIAN ASSISTANT

## 2019-10-15 PROCEDURE — 93000 ELECTROCARDIOGRAM COMPLETE: CPT | Performed by: PHYSICIAN ASSISTANT

## 2019-10-29 ENCOUNTER — TRANSCRIBE ORDERS (OUTPATIENT)
Dept: ADMINISTRATIVE | Facility: HOSPITAL | Age: 69
End: 2019-10-29

## 2019-10-29 DIAGNOSIS — I71.9 AORTIC ANEURYSM WITHOUT RUPTURE, UNSPECIFIED PORTION OF AORTA (HCC): Primary | ICD-10-CM

## 2019-10-30 ENCOUNTER — HOSPITAL ENCOUNTER (OUTPATIENT)
Dept: ULTRASOUND IMAGING | Facility: HOSPITAL | Age: 69
Discharge: HOME | End: 2019-10-30
Payer: MEDICARE

## 2019-10-30 DIAGNOSIS — Z13.6 SCREENING FOR ABDOMINAL AORTIC ANEURYSM: ICD-10-CM

## 2019-10-30 DIAGNOSIS — I71.9 AORTIC ANEURYSM WITHOUT RUPTURE, UNSPECIFIED PORTION OF AORTA (HCC): ICD-10-CM

## 2019-10-30 PROCEDURE — 76706 US ABDL AORTA SCREEN AAA: CPT

## 2019-11-01 ENCOUNTER — APPOINTMENT (OUTPATIENT)
Dept: LAB | Facility: CLINIC | Age: 69
End: 2019-11-01
Payer: MEDICARE

## 2019-11-01 ENCOUNTER — TRANSCRIBE ORDERS (OUTPATIENT)
Dept: LAB | Facility: CLINIC | Age: 69
End: 2019-11-01

## 2019-11-01 DIAGNOSIS — E29.1 3-OXO-5 ALPHA-STEROID DELTA 4-DEHYDROGENASE DEFICIENCY: ICD-10-CM

## 2019-11-01 DIAGNOSIS — E78.5 HYPERLIPIDEMIA, UNSPECIFIED HYPERLIPIDEMIA TYPE: Primary | ICD-10-CM

## 2019-11-01 DIAGNOSIS — E78.5 HYPERLIPIDEMIA, UNSPECIFIED HYPERLIPIDEMIA TYPE: ICD-10-CM

## 2019-11-01 LAB
CHOLEST SERPL-MCNC: 195 MG/DL (ref 50–200)
HDLC SERPL-MCNC: 39 MG/DL
LDLC SERPL CALC-MCNC: 114 MG/DL (ref 0–100)
NONHDLC SERPL-MCNC: 156 MG/DL
TESTOST SERPL-MCNC: 133 NG/DL (ref 95–948)
TRIGL SERPL-MCNC: 208 MG/DL

## 2019-11-01 PROCEDURE — 36415 COLL VENOUS BLD VENIPUNCTURE: CPT

## 2019-11-01 PROCEDURE — 80061 LIPID PANEL: CPT

## 2019-11-01 PROCEDURE — 84403 ASSAY OF TOTAL TESTOSTERONE: CPT

## 2019-11-08 ENCOUNTER — REMOTE DEVICE CLINIC VISIT (OUTPATIENT)
Dept: CARDIOLOGY CLINIC | Facility: CLINIC | Age: 69
End: 2019-11-08
Payer: MEDICARE

## 2019-11-08 DIAGNOSIS — Z95.818 PRESENCE OF OTHER CARDIAC IMPLANTS AND GRAFTS: Primary | ICD-10-CM

## 2019-11-08 PROCEDURE — 93299 PR REM INTERROG ICPMS/SCRMS <30 D TECH REVIEW: CPT | Performed by: INTERNAL MEDICINE

## 2019-11-08 PROCEDURE — 93298 REM INTERROG DEV EVAL SCRMS: CPT | Performed by: INTERNAL MEDICINE

## 2019-11-08 NOTE — PROGRESS NOTES
Results for orders placed or performed in visit on 11/08/19   Cardiac EP device report    Narrative    MDT ILR  CARELINK TRANSMISSION: BATTERY STATUS "OK"  22 AF NOTED, LONGEST 28 MINS  0 2% BURDEN  NO AF  AVAIL EGRAMS PRESENT AS SR W/PACS & PVCS  NOISE NOTED  PT ON METO SUCC  NO PATIENT ACTIVATED EPISODES  NORMAL DEVICE FUNCTION   NC

## 2019-11-11 DIAGNOSIS — I48.3 TYPICAL ATRIAL FLUTTER (HCC): ICD-10-CM

## 2019-11-11 RX ORDER — METOPROLOL SUCCINATE 100 MG/1
TABLET, EXTENDED RELEASE ORAL
Qty: 30 TABLET | Refills: 0 | Status: SHIPPED | OUTPATIENT
Start: 2019-11-11 | End: 2019-12-18 | Stop reason: SDUPTHER

## 2019-12-16 ENCOUNTER — APPOINTMENT (OUTPATIENT)
Dept: LAB | Facility: CLINIC | Age: 69
End: 2019-12-16
Payer: MEDICARE

## 2019-12-16 ENCOUNTER — TRANSCRIBE ORDERS (OUTPATIENT)
Dept: LAB | Facility: CLINIC | Age: 69
End: 2019-12-16

## 2019-12-16 DIAGNOSIS — E78.5 HYPERLIPIDEMIA, UNSPECIFIED HYPERLIPIDEMIA TYPE: Primary | ICD-10-CM

## 2019-12-16 DIAGNOSIS — E78.5 HYPERLIPIDEMIA, UNSPECIFIED HYPERLIPIDEMIA TYPE: ICD-10-CM

## 2019-12-16 LAB
CHOLEST SERPL-MCNC: 127 MG/DL (ref 50–200)
HDLC SERPL-MCNC: 39 MG/DL
LDLC SERPL CALC-MCNC: 57 MG/DL (ref 0–100)
NONHDLC SERPL-MCNC: 88 MG/DL
TRIGL SERPL-MCNC: 154 MG/DL

## 2019-12-16 PROCEDURE — 36415 COLL VENOUS BLD VENIPUNCTURE: CPT

## 2019-12-16 PROCEDURE — 80061 LIPID PANEL: CPT

## 2019-12-18 DIAGNOSIS — I48.3 TYPICAL ATRIAL FLUTTER (HCC): ICD-10-CM

## 2019-12-18 RX ORDER — METOPROLOL SUCCINATE 100 MG/1
100 TABLET, EXTENDED RELEASE ORAL DAILY
Qty: 30 TABLET | Refills: 5 | Status: SHIPPED | OUTPATIENT
Start: 2019-12-18 | End: 2019-12-20 | Stop reason: SDUPTHER

## 2019-12-20 DIAGNOSIS — I48.3 TYPICAL ATRIAL FLUTTER (HCC): ICD-10-CM

## 2019-12-20 RX ORDER — METOPROLOL SUCCINATE 100 MG/1
100 TABLET, EXTENDED RELEASE ORAL DAILY
Qty: 90 TABLET | Refills: 3 | Status: SHIPPED | OUTPATIENT
Start: 2019-12-20 | End: 2020-12-18

## 2020-01-16 ENCOUNTER — OFFICE VISIT (OUTPATIENT)
Dept: UROLOGY | Facility: CLINIC | Age: 70
End: 2020-01-16
Payer: MEDICARE

## 2020-01-16 VITALS
DIASTOLIC BLOOD PRESSURE: 74 MMHG | SYSTOLIC BLOOD PRESSURE: 132 MMHG | HEIGHT: 68 IN | BODY MASS INDEX: 28.34 KG/M2 | HEART RATE: 92 BPM | WEIGHT: 187 LBS

## 2020-01-16 DIAGNOSIS — N52.9 ERECTILE DYSFUNCTION, UNSPECIFIED ERECTILE DYSFUNCTION TYPE: Primary | ICD-10-CM

## 2020-01-16 DIAGNOSIS — N13.8 BPH WITH OBSTRUCTION/LOWER URINARY TRACT SYMPTOMS: ICD-10-CM

## 2020-01-16 DIAGNOSIS — N40.1 BPH WITH OBSTRUCTION/LOWER URINARY TRACT SYMPTOMS: ICD-10-CM

## 2020-01-16 LAB
SL AMB  POCT GLUCOSE, UA: ABNORMAL
SL AMB LEUKOCYTE ESTERASE,UA: ABNORMAL
SL AMB POCT BILIRUBIN,UA: ABNORMAL
SL AMB POCT BLOOD,UA: ABNORMAL
SL AMB POCT CLARITY,UA: CLEAR
SL AMB POCT COLOR,UA: YELLOW
SL AMB POCT KETONES,UA: ABNORMAL
SL AMB POCT NITRITE,UA: ABNORMAL
SL AMB POCT PH,UA: 5
SL AMB POCT SPECIFIC GRAVITY,UA: 1.02
SL AMB POCT URINE PROTEIN: ABNORMAL
SL AMB POCT UROBILINOGEN: ABNORMAL

## 2020-01-16 PROCEDURE — 81002 URINALYSIS NONAUTO W/O SCOPE: CPT | Performed by: UROLOGY

## 2020-01-16 PROCEDURE — 99202 OFFICE O/P NEW SF 15 MIN: CPT | Performed by: UROLOGY

## 2020-01-16 RX ORDER — ATORVASTATIN CALCIUM 40 MG/1
TABLET, FILM COATED ORAL
Refills: 1 | COMMUNITY
Start: 2019-11-05 | End: 2021-01-21 | Stop reason: ALTCHOICE

## 2020-01-16 RX ORDER — VITAMIN E 268 MG
400 CAPSULE ORAL DAILY
COMMUNITY

## 2020-01-16 RX ORDER — TADALAFIL 20 MG/1
20 TABLET ORAL DAILY PRN
Qty: 6 TABLET | Refills: 6 | Status: SHIPPED | OUTPATIENT
Start: 2020-01-16 | End: 2021-06-10

## 2020-01-16 NOTE — PROGRESS NOTES
Progress Note - Urology  Temo Heart 71 y o  male MRN: [id number]  Encounter: [id number]      Chief Complaint:   Chief Complaint   Patient presents with    Benign Prostatic Hypertrophy       HPI:      27-year-old presents for evaluation of a weak urinary stream   This occurred with the use of Lipitor  When he stopped the Lipitor his stream return to normal   He is now taking Lipitor every other day and remains stable from a urinary standpoint  History also shows that he had an left orchiectomy 7 or 8 years ago  Etiology was undetermined but does not the think it was cancer  He has had no further testing in that regard but he has been on testosterone replacement several years ongoing  He has use AndroGel and is now using injection therapy on a monthly basis  His last testosterone was 133  His PSA was 0 6  MEDS:    Current Outpatient Medications:     aspirin 81 MG tablet, Take 81 mg by mouth daily, Disp: , Rfl:     atorvastatin (LIPITOR) 40 mg tablet, TAKE 1 TABLET BY MOUTH EVERY DAY AT NIGHT, Disp: , Rfl: 1    loratadine (CLARITIN) 10 mg tablet, Take 10 mg by mouth as needed , Disp: , Rfl:     metoprolol succinate (TOPROL-XL) 100 mg 24 hr tablet, Take 1 tablet (100 mg total) by mouth daily, Disp: 90 tablet, Rfl: 3    tadalafil (CIALIS) 20 MG tablet, daily as needed    , Disp: , Rfl:     testosterone cypionate (DEPO-TESTOSTERONE) 200 mg/mL SOLN, INJECT 1 5 ML EVERY 28 DAYS, Disp: , Rfl: 5    vitamin E, tocopherol, 400 units capsule, Take 400 Units by mouth daily, Disp: , Rfl:     albuterol (PROVENTIL HFA,VENTOLIN HFA) 90 mcg/act inhaler, Inhale 2 puffs every 6 (six) hours as needed, Disp: , Rfl:     tadalafil (CIALIS) 20 MG tablet, Take 1 tablet (20 mg total) by mouth daily as needed for erectile dysfunction for up to 6 doses, Disp: 6 tablet, Rfl: 6      PMH:  Past Medical History:   Diagnosis Date    Arthritis     Erectile dysfunction     Hypertension     Spinal stenosis          PSH  Past Surgical History:   Procedure Laterality Date    CARDIAC ELECTROPHYSIOLOGY MAPPING AND ABLATION      ORCHIECTOMY Left 2006    VASECTOMY           FH  Family History   Problem Relation Age of Onset    Lung cancer Mother     Colon cancer Mother         SH  Social History     Socioeconomic History    Marital status: /Civil Union     Spouse name: None    Number of children: None    Years of education: None    Highest education level: None   Occupational History    None   Social Needs    Financial resource strain: None    Food insecurity:     Worry: None     Inability: None    Transportation needs:     Medical: None     Non-medical: None   Tobacco Use    Smoking status: Former Smoker    Smokeless tobacco: Never Used   Substance and Sexual Activity    Alcohol use: No    Drug use: No    Sexual activity: None   Lifestyle    Physical activity:     Days per week: None     Minutes per session: None    Stress: None   Relationships    Social connections:     Talks on phone: None     Gets together: None     Attends Worship service: None     Active member of club or organization: None     Attends meetings of clubs or organizations: None     Relationship status: None    Intimate partner violence:     Fear of current or ex partner: None     Emotionally abused: None     Physically abused: None     Forced sexual activity: None   Other Topics Concern    None   Social History Narrative    None          ROS:  Review of Systems      Vitals:  Blood pressure 132/74, pulse 92, height 5' 8" (1 727 m), weight 84 8 kg (187 lb)  Physical Exam:     General Appearance   Well-developed 79-year-old male no acute distress  Cardiac   Heart rate is regular no murmurs appreciated  Pulmonary   Chest is clear to auscultation  Abdomen   Protuberant, soft, no mass appreciated nontender  Back    No CVA tenderness  Genitalia   Penis unremarkable no meatal discharge  The left testicle surgically absent    The right testicle and epididymis appeared to be normal   There is good testicular volume  No hernia defect noted no inguinal adenopathy  Rectal     Sphincter tone is normal   The prostate symmetrical grossly benign roughly 25 g without nodule  Extremities    No cyanosis or edema no calf tenderness  Neurologic   Grossly physiologic  Hearing is it reduced acuity  No facial asymmetry  Moving all extremities  Cognitive function appears to be normal   Walks with normal gait    Lab, Imaging and other studies:  Recent Results (from the past 672 hour(s))   POCT urine dip    Collection Time: 01/16/20  9:54 AM   Result Value Ref Range    LEUKOCYTE ESTERASE,UA neg     NITRITE,UA neg     SL AMB POCT UROBILINOGEN neg     POCT URINE PROTEIN neg      PH,UA 5 0     BLOOD,UA neg     SPECIFIC GRAVITY,UA 1 020     KETONES,UA neg     BILIRUBIN,UA neg     GLUCOSE, UA 50 mg/dl      COLOR,UA yellow     CLARITY,UA clear            IMPRESSION:  1  BPH without lower tract symptoms  2  History of hypogonadism    PLAN:  I recommended that he consider going back to the daily AndroGel  He should titrate the dose to monitor his serum testosterone  No urologic intervention is necessary in regard to his voiding issues as they have resolved  Recommend yearly CHLOÉ  Please note :  Voice dictation software has been used to create this document  There may be inadvertent transcription errors

## 2020-02-27 ENCOUNTER — REMOTE DEVICE CLINIC VISIT (OUTPATIENT)
Dept: CARDIOLOGY CLINIC | Facility: CLINIC | Age: 70
End: 2020-02-27
Payer: MEDICARE

## 2020-02-27 DIAGNOSIS — Z95.818 PRESENCE OF OTHER CARDIAC IMPLANTS AND GRAFTS: Primary | ICD-10-CM

## 2020-02-27 PROCEDURE — G2066 INTER DEVC REMOTE 30D: HCPCS | Performed by: INTERNAL MEDICINE

## 2020-02-27 PROCEDURE — 93298 REM INTERROG DEV EVAL SCRMS: CPT | Performed by: INTERNAL MEDICINE

## 2020-02-27 NOTE — PROGRESS NOTES
MDT ILR  CARELINK TRANSMISSION: LOOP RECORDER  PRSENTING RHYTHM NSR W/ PVCs @ 80 BPM  BATTERY STATUS "OK"  3 TACHY EPISODES W/ EGRAMS SHOWING SR W/ OVERSENSING  12 AF EPISODES W/ EGRAMS SUGGESTING SR W/ PACS, PVCs AND OVERSENSING  KEVON OT R/O AF  AF BURDEN = 0 1%  HX OF AFL  PT TAKES METOPROLOL SUCC AND ASA 81  NO AC   CHADS2 VACS2  NO PATIENT ACTIVATED EPISODES  NORMAL DEVICE FUNCTION   DL

## 2020-05-27 ENCOUNTER — REMOTE DEVICE CLINIC VISIT (OUTPATIENT)
Dept: CARDIOLOGY CLINIC | Facility: CLINIC | Age: 70
End: 2020-05-27
Payer: MEDICARE

## 2020-05-27 DIAGNOSIS — Z95.818 PRESENCE OF OTHER CARDIAC IMPLANTS AND GRAFTS: Primary | ICD-10-CM

## 2020-05-27 PROCEDURE — G2066 INTER DEVC REMOTE 30D: HCPCS | Performed by: INTERNAL MEDICINE

## 2020-05-27 PROCEDURE — 93298 REM INTERROG DEV EVAL SCRMS: CPT | Performed by: INTERNAL MEDICINE

## 2020-07-19 NOTE — PROGRESS NOTES
Assessment and Plan:   Mr Dov Stallworth a 77-year-old  male with history significant for lumbar degenerative arthritis and spinal stenosis, who presents for further evaluation of predominantly right hand pain and stiffness  He is referred by Dr Edith Vogt for a rheumatology consult  Jade Duran presents today for further evaluation of bilateral hand and right shoulder pain that has been occurring for more than 10 years now, but with symptoms primarily affecting his right hand as this also causes slight limitation in making a full fist which can interfere with some of his activities  He does report intermittent swelling of his right hand but I think this may be related to the humidity, as there is otherwise no persistent joint swelling or evidence of morning stiffness affecting his joints  I also do not appreciate any concerns for an inflammatory arthritis on his physical examination today  Given the presentation of his joint pain more so occurring with activities, relief with rest and lack of morning stiffness/pain, I suspect his presentation is likely consistent with osteoarthritis  We discussed this diagnosis in depth today and advised him that a conservative approach would be the most beneficial with continued use of Aleve 1-2 times daily as needed as well as initiation of occupational therapy  He is interested in starting this and I will place a referral today  - To ensure there is no evidence of an underlying inflammatory condition, I would also like to obtain x-rays and serologies to further evaluate  - I will see him back in the office in 8 weeks to review the results of the labs and x-rays  If his diagnosis is solely consistent with osteoarthritis I will refer him back to his primary care physician for continued treatment  Plan:  Diagnoses and all orders for this visit:    Diffuse arthralgia  -     CBC and differential; Future  -     Comprehensive metabolic panel;  Future  -     Chronic Hepatitis Panel; Future  -     C-reactive protein; Future  -     Sedimentation rate, automated; Future  -     RF Screen w/ Reflex to Titer; Future  -     Cyclic citrul peptide antibody, IgG; Future  -     Uric acid; Future  -     XR hand 3+ vw right; Future  -     XR hand 3+ vw left; Future  -     XR shoulder 2+ vw right; Future    Localized osteoarthritis of hands, bilateral  -     Ambulatory referral to Occupational Therapy; Future    Osteoarthritis of lumbar spine, unspecified spinal osteoarthritis complication status    Primary generalized (osteo)arthritis    Encounter for screening for other viral diseases   -     Chronic Hepatitis Panel; Future      I have personally reviewed pertinent films in PACS of the lumbar spine XR which shows multilevel degenerative changes  Activities as tolerated    Diet: low carb/low fat, more greens/vegetables, adequate hydration  Exercise: try to maintain a low impact exercise regimen as much as possible  Walk for 30 minutes a day for at least 3 days a week    Encouraged to maintain good sleep hygiene  Continue other medications as prescribed by PCP and other specialists        RTC in 8 weeks  HPI  Mr Ja Vieira a 80-year-old  male with history significant for lumbar degenerative arthritis and spinal stenosis, who presents for further evaluation of predominantly right hand pain and stiffness  He is referred by Dr Erica Mahoney for a rheumatology consult  Patient reports he previously worked as an  and while on the job performed many activities related to hand use  He has noticed pain predominantly affecting his right hand for at least 15 years now, but states it has gradually progressed over time  He feels the pain mostly over his MCP joints diffusely and states that he has difficulty making a full fist especially due to stiffness of his right hand middle finger    He reports that his pain mostly arises with activities and when he is at rest this relieves his symptoms for the most part  He reports mild symptoms occurring in his left hand but this does not really bother him  He has also noticed pain affecting his right shoulder without any limitation in range of motion, but he states that especially with over head arm movements this bothers him  He denies any pain of his wrists, elbows, left shoulder, hips, knees, ankles or feet  At times he has noticed swelling of his right hand especially with a change in the weather and humidity  He does not experience any morning stiffness  He does take 2 tablets of Aleve twice daily as needed on an infrequent basis and states that this helps him  He was seen by Orthopedics in 2007 and apparently had workup then which was unrevealing  He has not had any follow-up since then  He denies any fevers, unintentional weight loss, inflammatory eye disease, skin rash, psoriasis, mouth/nose ulcers, inflammatory bowel disease or family history of autoimmune disease  The following portions of the patient's history were reviewed and updated as appropriate: allergies, current medications, past family history, past medical history, past social history, past surgical history and problem list       Review of Systems  Constitutional: Negative for weight change, fevers, chills, night sweats, fatigue  ENT/Mouth: Negative for hearing changes, ear pain, nasal congestion, sinus pain, hoarseness, sore throat, rhinorrhea, swallowing difficulty  Eyes: Negative for pain, redness, discharge, vision changes  Cardiovascular: Negative for chest pain, SOB, palpitations  Respiratory: Negative for cough, sputum, wheezing, dyspnea  Gastrointestinal: Negative for nausea, vomiting, diarrhea, constipation, pain, heartburn  Genitourinary: Negative for dysuria, urinary frequency, hematuria  Musculoskeletal: As per HPI  Skin: Negative for skin rash, color changes     Neuro: Negative for weakness, numbness, tingling, loss of consciousness  Psych: Negative for anxiety, depression  Heme/Lymph: Negative for easy bruising, bleeding, lymphadenopathy          Past Medical History:   Diagnosis Date    Arthritis     Erectile dysfunction     Hypertension     Spinal stenosis        Past Surgical History:   Procedure Laterality Date    CARDIAC ELECTROPHYSIOLOGY MAPPING AND ABLATION      ORCHIECTOMY Left 2006    VASECTOMY         Social History     Socioeconomic History    Marital status: /Civil Union     Spouse name: Not on file    Number of children: Not on file    Years of education: Not on file    Highest education level: Not on file   Occupational History    Not on file   Social Needs    Financial resource strain: Not on file    Food insecurity:     Worry: Not on file     Inability: Not on file    Transportation needs:     Medical: Not on file     Non-medical: Not on file   Tobacco Use    Smoking status: Former Smoker    Smokeless tobacco: Never Used   Substance and Sexual Activity    Alcohol use: No    Drug use: No    Sexual activity: Not on file   Lifestyle    Physical activity:     Days per week: Not on file     Minutes per session: Not on file    Stress: Not on file   Relationships    Social connections:     Talks on phone: Not on file     Gets together: Not on file     Attends Mu-ism service: Not on file     Active member of club or organization: Not on file     Attends meetings of clubs or organizations: Not on file     Relationship status: Not on file    Intimate partner violence:     Fear of current or ex partner: Not on file     Emotionally abused: Not on file     Physically abused: Not on file     Forced sexual activity: Not on file   Other Topics Concern    Not on file   Social History Narrative    Not on file       Family History   Problem Relation Age of Onset    Lung cancer Mother     Colon cancer Mother        Allergies   Allergen Reactions    Atorvastatin Other (See Comments) Urinary retention       Current Outpatient Medications:     fluticasone (FLONASE) 50 mcg/act nasal spray, SPRAY 2 SPRAYS INTO EACH NOSTRIL EVERY DAY, Disp: , Rfl:     fluticasone-vilanterol (Breo Ellipta) 100-25 mcg/inh inhaler, Inhale 1 puff daily, Disp: , Rfl:     pravastatin (PRAVACHOL) 40 mg tablet, TAKE 1 TABLET BY MOUTH EVERY DAY AT NIGHT, Disp: , Rfl:     albuterol (PROVENTIL HFA,VENTOLIN HFA) 90 mcg/act inhaler, Inhale 2 puffs every 6 (six) hours as needed, Disp: , Rfl:     aspirin 81 MG tablet, Take 81 mg by mouth daily, Disp: , Rfl:     atorvastatin (LIPITOR) 40 mg tablet, TAKE 1 TABLET BY MOUTH EVERY DAY AT NIGHT, Disp: , Rfl: 1    BREO ELLIPTA 100-25 MCG/INH inhaler, , Disp: , Rfl:     loratadine (CLARITIN) 10 mg tablet, Take 10 mg by mouth as needed , Disp: , Rfl:     metoprolol succinate (TOPROL-XL) 100 mg 24 hr tablet, Take 1 tablet (100 mg total) by mouth daily, Disp: 90 tablet, Rfl: 3    predniSONE 10 mg tablet, TAKE 3 TABLETS DAILY FOR 5 DAYS, Disp: , Rfl:     tadalafil (CIALIS) 20 MG tablet, daily as needed  , Disp: , Rfl:     tadalafil (CIALIS) 20 MG tablet, Take 1 tablet (20 mg total) by mouth daily as needed for erectile dysfunction for up to 6 doses, Disp: 6 tablet, Rfl: 6    testosterone cypionate (DEPO-TESTOSTERONE) 200 mg/mL SOLN, INJECT 1 5 ML EVERY 28 DAYS, Disp: , Rfl: 5    vitamin E, tocopherol, 400 units capsule, Take 400 Units by mouth daily, Disp: , Rfl:       Objective:    Vitals:    07/21/20 0859   BP: 128/82   BP Location: Right arm   Patient Position: Sitting   Cuff Size: Extra-Large   Pulse: 66   Temp: 99 6 °F (37 6 °C)   Weight: 85 kg (187 lb 6 4 oz)       Physical Exam  General: Well appearing, well nourished, in no distress  Oriented x 3, normal mood and affect  Ambulating without difficulty  Hard of hearing  Skin: Good turgor, no rash, unusual bruising or prominent lesions  Hair: Normal texture and distribution    Nails: Normal color, no deformities  HEENT:  Head: Normocephalic, atraumatic  Eyes: Conjunctiva clear, sclera non-icteric, EOM intact  Neck: Supple, thyroid non-enlarged and non-tender  No lymphadenopathy  Extremities: No amputations or deformities, cyanosis, edema  Musculoskeletal:   Hands - there are mild osteoarthritic changes noted at his bilateral DIPs and PIPs  There is no tenderness or soft tissue swelling noted of these joints  He does report mild discomfort to palpation of his right hand MCPs but I do not appreciate any discrete soft tissue swelling  The left hand MCPs are unremarkable  He is able to make a full fist with his left hand, but does have slight limitation in making a full fist with his right hand secondary to middle finger stiffness  Wrists, elbows and shoulders - unremarkable, including at his right shoulder where there is no tenderness, soft tissue swelling or restriction in range of motion  Knees, ankles and feet - unremarkable  Neurologic: Alert and oriented  No focal neurological deficits appreciated  Psychiatric: Normal mood and affect  MADISON Gallego    Rheumatology

## 2020-07-21 ENCOUNTER — HOSPITAL ENCOUNTER (OUTPATIENT)
Dept: RADIOLOGY | Facility: HOSPITAL | Age: 70
Discharge: HOME | End: 2020-07-21
Payer: MEDICARE

## 2020-07-21 ENCOUNTER — OFFICE VISIT (OUTPATIENT)
Dept: RHEUMATOLOGY | Facility: CLINIC | Age: 70
End: 2020-07-21
Payer: MEDICARE

## 2020-07-21 ENCOUNTER — APPOINTMENT (OUTPATIENT)
Dept: LAB | Facility: CLINIC | Age: 70
End: 2020-07-21
Payer: MEDICARE

## 2020-07-21 VITALS
DIASTOLIC BLOOD PRESSURE: 82 MMHG | HEART RATE: 66 BPM | BODY MASS INDEX: 28.49 KG/M2 | TEMPERATURE: 99.6 F | WEIGHT: 187.4 LBS | SYSTOLIC BLOOD PRESSURE: 128 MMHG

## 2020-07-21 DIAGNOSIS — M19.042 LOCALIZED OSTEOARTHRITIS OF HANDS, BILATERAL: ICD-10-CM

## 2020-07-21 DIAGNOSIS — M25.50 DIFFUSE ARTHRALGIA: ICD-10-CM

## 2020-07-21 DIAGNOSIS — M19.041 LOCALIZED OSTEOARTHRITIS OF HANDS, BILATERAL: ICD-10-CM

## 2020-07-21 DIAGNOSIS — Z11.59 ENCOUNTER FOR SCREENING FOR OTHER VIRAL DISEASES: ICD-10-CM

## 2020-07-21 DIAGNOSIS — M15.0 PRIMARY GENERALIZED (OSTEO)ARTHRITIS: ICD-10-CM

## 2020-07-21 DIAGNOSIS — M25.50 DIFFUSE ARTHRALGIA: Primary | ICD-10-CM

## 2020-07-21 DIAGNOSIS — M47.816 OSTEOARTHRITIS OF LUMBAR SPINE, UNSPECIFIED SPINAL OSTEOARTHRITIS COMPLICATION STATUS: ICD-10-CM

## 2020-07-21 LAB
ALBUMIN SERPL BCP-MCNC: 4.1 G/DL (ref 3.5–5)
ALP SERPL-CCNC: 72 U/L (ref 46–116)
ALT SERPL W P-5'-P-CCNC: 30 U/L (ref 12–78)
ANION GAP SERPL CALCULATED.3IONS-SCNC: 11 MMOL/L (ref 4–13)
AST SERPL W P-5'-P-CCNC: 20 U/L (ref 5–45)
BASOPHILS # BLD AUTO: 0.05 THOUSANDS/ΜL (ref 0–0.1)
BASOPHILS NFR BLD AUTO: 1 % (ref 0–1)
BILIRUB SERPL-MCNC: 0.62 MG/DL (ref 0.2–1)
BUN SERPL-MCNC: 17 MG/DL (ref 5–25)
CALCIUM SERPL-MCNC: 8.8 MG/DL (ref 8.3–10.1)
CHLORIDE SERPL-SCNC: 105 MMOL/L (ref 100–108)
CO2 SERPL-SCNC: 23 MMOL/L (ref 21–32)
CREAT SERPL-MCNC: 1.08 MG/DL (ref 0.6–1.3)
CRP SERPL QL: <3 MG/L
EOSINOPHIL # BLD AUTO: 0.3 THOUSAND/ΜL (ref 0–0.61)
EOSINOPHIL NFR BLD AUTO: 5 % (ref 0–6)
ERYTHROCYTE [DISTWIDTH] IN BLOOD BY AUTOMATED COUNT: 11.9 % (ref 11.6–15.1)
ERYTHROCYTE [SEDIMENTATION RATE] IN BLOOD: 11 MM/HOUR (ref 0–10)
GFR SERPL CREATININE-BSD FRML MDRD: 69 ML/MIN/1.73SQ M
GLUCOSE SERPL-MCNC: 115 MG/DL (ref 65–140)
HBV CORE AB SER QL: NORMAL
HBV CORE IGM SER QL: NORMAL
HBV SURFACE AG SER QL: NORMAL
HCT VFR BLD AUTO: 47.6 % (ref 36.5–49.3)
HCV AB SER QL: NORMAL
HGB BLD-MCNC: 16.2 G/DL (ref 12–17)
IMM GRANULOCYTES # BLD AUTO: 0.02 THOUSAND/UL (ref 0–0.2)
IMM GRANULOCYTES NFR BLD AUTO: 0 % (ref 0–2)
LYMPHOCYTES # BLD AUTO: 2.01 THOUSANDS/ΜL (ref 0.6–4.47)
LYMPHOCYTES NFR BLD AUTO: 31 % (ref 14–44)
MCH RBC QN AUTO: 31.2 PG (ref 26.8–34.3)
MCHC RBC AUTO-ENTMCNC: 34 G/DL (ref 31.4–37.4)
MCV RBC AUTO: 92 FL (ref 82–98)
MONOCYTES # BLD AUTO: 0.62 THOUSAND/ΜL (ref 0.17–1.22)
MONOCYTES NFR BLD AUTO: 10 % (ref 4–12)
NEUTROPHILS # BLD AUTO: 3.54 THOUSANDS/ΜL (ref 1.85–7.62)
NEUTS SEG NFR BLD AUTO: 53 % (ref 43–75)
NRBC BLD AUTO-RTO: 0 /100 WBCS
PLATELET # BLD AUTO: 186 THOUSANDS/UL (ref 149–390)
PMV BLD AUTO: 9.6 FL (ref 8.9–12.7)
POTASSIUM SERPL-SCNC: 4.1 MMOL/L (ref 3.5–5.3)
PROT SERPL-MCNC: 7.2 G/DL (ref 6.4–8.2)
RBC # BLD AUTO: 5.2 MILLION/UL (ref 3.88–5.62)
RHEUMATOID FACT SER QL LA: NEGATIVE
SODIUM SERPL-SCNC: 139 MMOL/L (ref 136–145)
URATE SERPL-MCNC: 5.8 MG/DL (ref 4.2–8)
WBC # BLD AUTO: 6.54 THOUSAND/UL (ref 4.31–10.16)

## 2020-07-21 PROCEDURE — 99205 OFFICE O/P NEW HI 60 MIN: CPT | Performed by: INTERNAL MEDICINE

## 2020-07-21 PROCEDURE — 86803 HEPATITIS C AB TEST: CPT

## 2020-07-21 PROCEDURE — 86140 C-REACTIVE PROTEIN: CPT

## 2020-07-21 PROCEDURE — 73130 X-RAY EXAM OF HAND: CPT

## 2020-07-21 PROCEDURE — 86430 RHEUMATOID FACTOR TEST QUAL: CPT

## 2020-07-21 PROCEDURE — 86705 HEP B CORE ANTIBODY IGM: CPT

## 2020-07-21 PROCEDURE — 86200 CCP ANTIBODY: CPT

## 2020-07-21 PROCEDURE — 84550 ASSAY OF BLOOD/URIC ACID: CPT

## 2020-07-21 PROCEDURE — 87340 HEPATITIS B SURFACE AG IA: CPT

## 2020-07-21 PROCEDURE — 73030 X-RAY EXAM OF SHOULDER: CPT

## 2020-07-21 PROCEDURE — 36415 COLL VENOUS BLD VENIPUNCTURE: CPT

## 2020-07-21 PROCEDURE — 80053 COMPREHEN METABOLIC PANEL: CPT

## 2020-07-21 PROCEDURE — 86704 HEP B CORE ANTIBODY TOTAL: CPT

## 2020-07-21 PROCEDURE — 85025 COMPLETE CBC W/AUTO DIFF WBC: CPT

## 2020-07-21 PROCEDURE — 85652 RBC SED RATE AUTOMATED: CPT

## 2020-07-21 RX ORDER — PREDNISONE 10 MG/1
TABLET ORAL
COMMUNITY
Start: 2020-05-06 | End: 2021-01-21 | Stop reason: ALTCHOICE

## 2020-07-21 RX ORDER — FLUTICASONE FUROATE AND VILANTEROL TRIFENATATE 100; 25 UG/1; UG/1
POWDER RESPIRATORY (INHALATION)
COMMUNITY
Start: 2020-05-18 | End: 2021-07-22

## 2020-07-21 RX ORDER — PRAVASTATIN SODIUM 40 MG
TABLET ORAL
COMMUNITY
Start: 2020-07-14 | End: 2022-03-22 | Stop reason: ALTCHOICE

## 2020-07-21 RX ORDER — FLUTICASONE PROPIONATE 50 MCG
SPRAY, SUSPENSION (ML) NASAL
COMMUNITY
Start: 2020-02-21

## 2020-07-21 RX ORDER — FLUTICASONE FUROATE AND VILANTEROL 100; 25 UG/1; UG/1
1 POWDER RESPIRATORY (INHALATION) DAILY
COMMUNITY
Start: 2020-04-22 | End: 2021-06-11

## 2020-07-23 ENCOUNTER — EVALUATION (OUTPATIENT)
Dept: OCCUPATIONAL THERAPY | Facility: CLINIC | Age: 70
End: 2020-07-23
Payer: MEDICARE

## 2020-07-23 DIAGNOSIS — M19.042 LOCALIZED OSTEOARTHRITIS OF HANDS, BILATERAL: ICD-10-CM

## 2020-07-23 DIAGNOSIS — M19.041 LOCALIZED OSTEOARTHRITIS OF HANDS, BILATERAL: ICD-10-CM

## 2020-07-23 LAB — CCP IGA+IGG SERPL IA-ACNC: 4 UNITS (ref 0–19)

## 2020-07-23 PROCEDURE — 97110 THERAPEUTIC EXERCISES: CPT | Performed by: OCCUPATIONAL THERAPIST

## 2020-07-23 PROCEDURE — 97165 OT EVAL LOW COMPLEX 30 MIN: CPT | Performed by: OCCUPATIONAL THERAPIST

## 2020-07-23 NOTE — PROGRESS NOTES
OT Evaluation     Today's date: 2020  Patient name: Chitra Burnham  : 1950  MRN: [id number]  Referring provider: Mikey Silva MD  Dx:   Encounter Diagnosis     ICD-10-CM    1  Localized osteoarthritis of hands, bilateral M19 041 Ambulatory referral to Occupational Therapy    M19 042                   Assessment  Assessment details: Moose Fletcher is a RHD male referred for BL hand OA, specifically R LF MCP and L 1st CMC  He demonstrates reduced ROM and stiffness in the right long finger related to arthritic change and also reduced  strength in this hand  His left thumb ROM is WNL, however pain is present with activity at the [facility] joint and he demonstrates a positive CMC grind test  He will benefit from ROM for his right hand, strengthening, activity modification, and ergonomics  See below for a detailed assessment  Impairments: abnormal or restricted ROM, activity intolerance, impaired physical strength, lacks appropriate home exercise program and pain with function    Symptom irritability: low  Goals  STG: Patient will be compliant with  home exercise program in 1 week  STG: Pain will not exceed a 4/10 during appropriate activity and during therapy in 4 weeks  STG: Range of motion of right middle finger will be improved to MP= 55  degrees in 4 weeks  STG: Strength will be improved in the right hand to  strength= 57 pounds, lateral pinch= 19 pounds in 4 weeks  LTG: Performance in ADLs and IADLS will be improved to prior level of function with the affected extremity within 6 weeks or discharge  LTG: FOTO score increase by 5 points within 6 weeks or discharge  Plan  Plan details: Treatment to include modalities, manual therapy, PRE's, HEP, and orthotics as appropriate     Patient would benefit from: skilled OT, OT eval and custom splinting  Planned modality interventions: thermotherapy: hydrocollator packs  Planned therapy interventions: home exercise program, joint mobilization, manual therapy, therapeutic exercise, patient education, therapeutic activities, strengthening and stretching  Frequency: 2x week  Duration in visits: 10  Plan of Care beginning date: 7/23/2020  Plan of Care expiration date: 9/23/2020  Treatment plan discussed with: patient        Subjective Evaluation    History of Present Illness  Mechanism of injury: He reports stiffness and pain in the bilateral hands for more than 10 years, specifically in the right long finger and the left thumb  Pain  Pain scale at highest: L 1st CMC joint 3/10, R LF MCP 5/10  Quality: dull ache    Social Support    Employment status: not working  Hand dominance: right    Treatments  Current treatment: occupational therapy  Patient Goals  Patient goals for therapy: decreased pain, increased motion and increased strength          Objective     Observations     Right Wrist/Hand   Positive for [place]'s nodes (LF)  Tenderness     Left Wrist/Hand   Tenderness in the [facility]  Right Wrist/Hand   No tenderness in the carpometacarpal joint  Active Range of Motion     Right Digits   Flexion   Index     MCP: 52    PIP: 92    DIP: 40  Middle     MCP: 49    PIP: 89    DIP: 63    Passive Range of Motion     Right Digits   Flexion   Middle     MCP: 50    PIP: 90    DIP: 75    Strength/Myotome Testing     Left Wrist/Hand      (2nd hand position)     Trial 1: 65 7    Thumb Strength  Key/Lateral Pinch     [place] 1: 23 5  Palmar/Three-Point Pinch     Trial 1: 9 3    Comments: Pain    Right Wrist/Hand      (2nd hand position)     Trial 1: 50 9    Thumb Strength   Key/Lateral Pinch     Trial 1: 16 1  Palmar/Three-Point Pinch     Trial 1: 13 5    Tests     Left Wrist/Hand   Positive CMC grind  Negative CMC shoulder sign       Swelling     Right Wrist/Hand     Additional Swelling Details  Localized swelling over LF MCP             Precautions: Universal  HEP:R LF stretching, place and holds, R [facility] program     Manuals             R LF PIP distraction              L CMC mobs                                       Neuro Re-Ed                                                                                                        Ther Ex             R pencil progressive grasp             B/L connor             L coordination balls              Rubber band extension R                                                                 Ther Activity                                       Gait Training                                       Modalities             B/L P 10'

## 2020-07-28 ENCOUNTER — OFFICE VISIT (OUTPATIENT)
Dept: OCCUPATIONAL THERAPY | Facility: CLINIC | Age: 70
End: 2020-07-28
Payer: MEDICARE

## 2020-07-28 DIAGNOSIS — M19.041 LOCALIZED OSTEOARTHRITIS OF HANDS, BILATERAL: Primary | ICD-10-CM

## 2020-07-28 DIAGNOSIS — M19.042 LOCALIZED OSTEOARTHRITIS OF HANDS, BILATERAL: Primary | ICD-10-CM

## 2020-07-28 PROCEDURE — 97140 MANUAL THERAPY 1/> REGIONS: CPT | Performed by: OCCUPATIONAL THERAPIST

## 2020-07-28 PROCEDURE — 97110 THERAPEUTIC EXERCISES: CPT | Performed by: OCCUPATIONAL THERAPIST

## 2020-07-28 NOTE — PROGRESS NOTES
Daily Note     Today's date: 2020  Patient name: Aaron Wilson  : 1950  MRN: [id number]  Referring provider: Hebert Almendarez MD  Dx:   Encounter Diagnosis     ICD-10-CM    1  Localized osteoarthritis of hands, bilateral M19 041     M19 042                   Subjective: "this finger really gets me" referring to the R LF      Objective: See treatment diary below  Assessment: [place] applied to R LF MP and L CMC after mobs, and he felt some good relief with this  R LF is demonstrating a mild swan neck deformity and he also reports what sounds like extensor subluxation off the MP head, although this was not observed today  Plan: Progress treatment as tolerated         Precautions: Universal  HEP:R LF stretching, place and holds, R [facility] program     Manuals             R LF PIP distraction  5'            L CMC mobs 5'                                      Neuro Re-Ed                                                                                                        Ther Ex             R pencil progressive grasp 2x             B/L keypegs L hand translation, R hand opposition with LF            L coordination balls  2 mins            Rubber band extension B/L 3x10            Digit abduction for 1st interossei strength L Rubber band 2x10                                                   Ther Activity                                       Gait Training                                       Modalities             B/L MHP 10'

## 2020-07-31 ENCOUNTER — OFFICE VISIT (OUTPATIENT)
Dept: OCCUPATIONAL THERAPY | Facility: CLINIC | Age: 70
End: 2020-07-31
Payer: MEDICARE

## 2020-07-31 DIAGNOSIS — M19.042 LOCALIZED OSTEOARTHRITIS OF HANDS, BILATERAL: Primary | ICD-10-CM

## 2020-07-31 DIAGNOSIS — M19.041 LOCALIZED OSTEOARTHRITIS OF HANDS, BILATERAL: Primary | ICD-10-CM

## 2020-07-31 PROCEDURE — 97110 THERAPEUTIC EXERCISES: CPT

## 2020-07-31 NOTE — PROGRESS NOTES
Daily Note     Today's date: 2020  Patient name: Lia Funez  : 1950  MRN: [id number]  Referring provider: Shailesh Asif MD  Dx:   Encounter Diagnosis     ICD-10-CM    1  Localized osteoarthritis of hands, bilateral M19 041     M19 042                   Subjective: Pt c/o significant pain with self stretches of R MF MCP joint; so he discontinued doing this at home  Objective: See treatment diary below  Assessment: Pt experiencing high pain levels with release of passive stretch to the MP of the RMF at home  Focused on active MP and passive PIP  He had difficulty with L thumb resistive exercises with the RB, due to weakness  Most of the exercise was being performed by the fingers only  Instructed pt in isometrics which he was able to perform  Instructed pt to perform to tolerance; only giving resistance to tolerate without pain  Plan: Progress treatment as tolerated         Precautions: Universal  HEP:R LF stretching, place and holds, R [facility] program     Manuals            R LF PIP distraction  5' 5'           L CMC mobs 5' 5'                                     Neuro Re-Ed                                                                                                        Ther Ex             R pencil progressive grasp 2x  2x           B/L keypegs L hand translation, R hand opposition with LF In L translation, out R oop to MF           L coordination balls  2 mins 2'           Rubber band extension B/L 3x10 3x10           Digit abduction for 1st interossei strength L Rubber band 2x10 isometrics 10 sec x5                                                  Ther Activity                                       Gait Training                                       Modalities             B/L MHP 10' 10'

## 2020-08-04 ENCOUNTER — OFFICE VISIT (OUTPATIENT)
Dept: OCCUPATIONAL THERAPY | Facility: CLINIC | Age: 70
End: 2020-08-04
Payer: MEDICARE

## 2020-08-04 DIAGNOSIS — M19.042 LOCALIZED OSTEOARTHRITIS OF HANDS, BILATERAL: Primary | ICD-10-CM

## 2020-08-04 DIAGNOSIS — M19.041 LOCALIZED OSTEOARTHRITIS OF HANDS, BILATERAL: Primary | ICD-10-CM

## 2020-08-04 PROCEDURE — 97140 MANUAL THERAPY 1/> REGIONS: CPT | Performed by: OCCUPATIONAL THERAPIST

## 2020-08-04 PROCEDURE — 97110 THERAPEUTIC EXERCISES: CPT | Performed by: OCCUPATIONAL THERAPIST

## 2020-08-04 NOTE — PROGRESS NOTES
Daily Note     Today's date: 2020  Patient name: Chitra Burnham  : 1950  MRN: [id number]  Referring provider: Mikey Silva MD  Dx:   Encounter Diagnosis     ICD-10-CM    1  Localized osteoarthritis of hands, bilateral  M19 041     M19 042                   Subjective: "this finger hurts like a SOB", referring to the right LF  Objective: See treatment diary below  Assessment: Improved pain in the left thumb  Considerable stiffness and pain continues in the right long finger  Applied kinesiotape to this digit for support, pain management, and edema management  Plan: Progress treatment as tolerated         Precautions: Universal  HEP:R LF stretching, place and holds, R [facility] program     Manuals           R LF PIP distraction  5' 5' 3'          L [facility] mobs 5' 5' 5'          KT   R LF                       Neuro Re-Ed                                                                                                        Ther Ex             R pencil progressive grasp 2x  2x           B/L keypegs L hand translation, R hand opposition with LF In L translation, out R opp to MF L hand translation, R hand opposition with LF          L coordination balls  2 mins 2' 2' clockwise          Rubber band extension B/L 3x10 3x10           Digit abduction for 1st interossei strength L Rubber band 2x10 isometrics 10 sec x5 yellow ext web 2x10          Wall walking   L hand 5x each tennis ball                                     Ther Activity             Velcro pinch   2x L hand                       Gait Training                                       Modalities             B/L MHP 10' 10' 5'

## 2020-08-07 ENCOUNTER — OFFICE VISIT (OUTPATIENT)
Dept: OCCUPATIONAL THERAPY | Facility: CLINIC | Age: 70
End: 2020-08-07
Payer: MEDICARE

## 2020-08-07 DIAGNOSIS — M19.041 LOCALIZED OSTEOARTHRITIS OF HANDS, BILATERAL: Primary | ICD-10-CM

## 2020-08-07 DIAGNOSIS — M19.042 LOCALIZED OSTEOARTHRITIS OF HANDS, BILATERAL: Primary | ICD-10-CM

## 2020-08-07 PROCEDURE — 97110 THERAPEUTIC EXERCISES: CPT

## 2020-08-07 PROCEDURE — 97140 MANUAL THERAPY 1/> REGIONS: CPT

## 2020-08-07 NOTE — PROGRESS NOTES
Daily Note     Today's date: 2020  Patient name: Man Craft  : 1950  MRN: [id number]  Referring provider: Dnaiela Gardiner MD  Dx:   Encounter Diagnosis     ICD-10-CM    1  Localized osteoarthritis of hands, bilateral  M19 041     M19 042                   Subjective: "The (left) thumb has gotten better  It doesn't hurt as much  I don't think the right will get much better "      Objective: See treatment diary below  Assessment:  Less pain L thumb  KT didn't seem to help the R MF  Pt is I with HEP  Plan: Cont 1-2 more weeks  Refine HEP       Precautions: Universal  HEP:R LF stretching, place and holds, R [facility] program     Manuals          R LF PIP distraction  5' 5' 3' 3'         L [facility] mobs 5' 5' 5' 5'         KT   R LF deferred                      Neuro Re-Ed                                                                                                        Ther Ex             R pencil progressive grasp 2x  2x           B/L keypegs L hand translation, R hand opposition with LF In L translation, out R opp to MF L hand translation, R hand opposition with LF L trans/R opp to LF         L coordination balls  2 mins 2' 2' clockwise 2' CW         Rubber band extension B/L 3x10 3x10           Digit abduction for 1st interossei strength L Rubber band 2x10 isometrics 10 sec x5 yellow ext web 2x10 Yellow 2x10         Wall walking   L hand 5x each tennis ball  L 5x TB                                   Ther Activity             Velcro pinch   2x L hand 2x L hand                      Gait Training                                       Modalities             B/L MHP 10' 10' 5' 5'

## 2020-08-10 ENCOUNTER — APPOINTMENT (OUTPATIENT)
Dept: OCCUPATIONAL THERAPY | Facility: CLINIC | Age: 70
End: 2020-08-10
Payer: MEDICARE

## 2020-08-12 ENCOUNTER — OFFICE VISIT (OUTPATIENT)
Dept: OCCUPATIONAL THERAPY | Facility: CLINIC | Age: 70
End: 2020-08-12
Payer: MEDICARE

## 2020-08-12 DIAGNOSIS — M19.042 LOCALIZED OSTEOARTHRITIS OF HANDS, BILATERAL: Primary | ICD-10-CM

## 2020-08-12 DIAGNOSIS — M19.041 LOCALIZED OSTEOARTHRITIS OF HANDS, BILATERAL: Primary | ICD-10-CM

## 2020-08-12 PROCEDURE — 97140 MANUAL THERAPY 1/> REGIONS: CPT | Performed by: OCCUPATIONAL THERAPIST

## 2020-08-12 PROCEDURE — 97110 THERAPEUTIC EXERCISES: CPT | Performed by: OCCUPATIONAL THERAPIST

## 2020-08-12 NOTE — PROGRESS NOTES
Daily Note     Today's date: 2020  Patient name: Kriss Salguero  : 1950  MRN: [id number]  Referring provider: Kurtis Garcia MD  Dx:   Encounter Diagnosis     ICD-10-CM    1  Localized osteoarthritis of hands, bilateral  M19 041     M19 042                   Subjective: "I think the middle finger is what it is"      Objective: See treatment diary below      Assessment: Some improved IF motion with MWM; trialing MP block orthotic ring for LF pain  Plan: Continue per plan of care        Precautions: Universal  HEP:R LF stretching, place and holds, R [facility] program     Manuals         R LF PIP distraction  5' 5' 3' 3' 3        L CMC mobs 5' 5' 5' 5' 5        KT   R LF deferred         MWM     IF 1x 12        Neuro Re-Ed                                                                                                        Ther Ex             R pencil progressive grasp 2x  2x           B/L keypegs L hand translation, R hand opposition with LF In L translation, out R opp to MF L hand translation, R hand opposition with LF L trans/R opp to LF L trans/R opp to LF        L coordination balls  2 mins 2' 2' clockwise 2' CW 2' cw        Rubber band extension B/L 3x10 3x10           Digit abduction for 1st interossei strength L Rubber band 2x10 isometrics 10 sec x5 yellow ext web 2x10 Yellow 2x10 y 3x 10        Wall walking   L hand 5x each tennis ball  L 5x TB l 5x tb                                  Ther Activity             Velcro pinch   2x L hand 2x L hand         Pinch [place]     2x red TT OK        Gait Training                                       Modalities             B/L MHP 10' 10' 5' 5' 5

## 2020-08-14 ENCOUNTER — OFFICE VISIT (OUTPATIENT)
Dept: OCCUPATIONAL THERAPY | Facility: CLINIC | Age: 70
End: 2020-08-14
Payer: MEDICARE

## 2020-08-14 DIAGNOSIS — M19.042 LOCALIZED OSTEOARTHRITIS OF HANDS, BILATERAL: Primary | ICD-10-CM

## 2020-08-14 DIAGNOSIS — M19.041 LOCALIZED OSTEOARTHRITIS OF HANDS, BILATERAL: Primary | ICD-10-CM

## 2020-08-14 PROCEDURE — 97140 MANUAL THERAPY 1/> REGIONS: CPT

## 2020-08-14 PROCEDURE — 97110 THERAPEUTIC EXERCISES: CPT

## 2020-08-14 NOTE — PROGRESS NOTES
Daily Note     Today's date: 2020  Patient name: Ana Berger  : 1950  MRN: [id number]  Referring provider: Rbuén Fisher MD  Dx:   Encounter Diagnosis     ICD-10-CM    1  Localized osteoarthritis of hands, bilateral  M19 041     M19 042                   Subjective: "The left thumb is doing so much better than it was  The (R) MF is the same  I don't think it will get much better than this "      Objective: See treatment diary below      Assessment: Pt reports less pain with wearing the splint for the RMF  He was able to sleep without pain  Pain is minimal in L CMC  Plan: Continue per plan of care        Precautions: Universal  HEP:R LF stretching, place and holds, R [facility] program     Manuals        R LF PIP distraction  5' 5' 3' 3' 3 3'       L [facility] mobs 5' 5' 5' 5' 5 5'       KT   R LF deferred         MWM     IF 1x 12        Neuro Re-Ed                                                                                                        Ther Ex             R pencil progressive grasp 2x  2x           B/L keypegs L hand translation, R hand opposition with LF In L translation, out R opp to MF L hand translation, R hand opposition with LF L trans/R opp to LF L trans/R opp to LF L transl/R opp to LF       L coordination balls  2 mins 2' 2' clockwise 2' CW 2' cw 2' CW       Rubber band extension B/L 3x10 3x10           Digit abduction for 1st interossei strength L Rubber band 2x10 isometrics 10 sec x5 yellow ext web 2x10 Yellow 2x10 y 3x 10 Y 3x  10       Wall walking   L hand 5x each tennis ball  L 5x TB l 5x tb L TB 5x                                 Ther Activity             Velcro pinch   2x L hand 2x L hand         Pinch [place]     2x red TT OK 2x red       Gait Training                                       Modalities             B/L MHP 10' 10' 5' 5' 5 5'

## 2020-08-24 ENCOUNTER — REMOTE DEVICE CLINIC VISIT (OUTPATIENT)
Dept: CARDIOLOGY CLINIC | Facility: CLINIC | Age: 70
End: 2020-08-24
Payer: MEDICARE

## 2020-08-24 DIAGNOSIS — Z95.818 PRESENCE OF OTHER CARDIAC IMPLANTS AND GRAFTS: Primary | ICD-10-CM

## 2020-08-24 PROCEDURE — G2066 INTER DEVC REMOTE 30D: HCPCS | Performed by: INTERNAL MEDICINE

## 2020-08-24 PROCEDURE — 93298 REM INTERROG DEV EVAL SCRMS: CPT | Performed by: INTERNAL MEDICINE

## 2020-08-24 NOTE — PROGRESS NOTES
Results for orders placed or performed in visit on 08/24/20   Cardiac EP device report    Narrative    MDT ILR  CARELINK TRANSMISSION LOOP: BATTERY STATUS "OK"  PRESENTING RHTYHM: NSR @ 88 BPM   3 TACHY EPISODES W/ EGRMS SHOWING SR @ 86-90 BPM W/ OVERSENSING  NO OTHER PATIENT OR DEVICE ACTIVATED EPISODES  NORMAL DEVICE FUNCTION    [address] [place] [address]

## 2020-10-21 ENCOUNTER — OFFICE VISIT (OUTPATIENT)
Dept: RHEUMATOLOGY | Facility: CLINIC | Age: 70
End: 2020-10-21
Payer: MEDICARE

## 2020-10-21 VITALS — DIASTOLIC BLOOD PRESSURE: 84 MMHG | TEMPERATURE: 99.7 F | SYSTOLIC BLOOD PRESSURE: 122 MMHG | HEART RATE: 89 BPM

## 2020-10-21 DIAGNOSIS — M15.0 PRIMARY GENERALIZED (OSTEO)ARTHRITIS: Primary | ICD-10-CM

## 2020-10-21 DIAGNOSIS — M47.816 OSTEOARTHRITIS OF LUMBAR SPINE, UNSPECIFIED SPINAL OSTEOARTHRITIS COMPLICATION STATUS: ICD-10-CM

## 2020-10-21 DIAGNOSIS — M19.041 LOCALIZED OSTEOARTHRITIS OF RIGHT HAND: ICD-10-CM

## 2020-10-21 PROCEDURE — 99214 OFFICE O/P EST MOD 30 MIN: CPT | Performed by: INTERNAL MEDICINE

## 2020-10-28 ENCOUNTER — TELEPHONE (OUTPATIENT)
Dept: UROLOGY | Facility: AMBULATORY SURGERY CENTER | Age: 70
End: 2020-10-28

## 2020-10-28 DIAGNOSIS — N13.8 BPH WITH OBSTRUCTION/LOWER URINARY TRACT SYMPTOMS: Primary | ICD-10-CM

## 2020-10-28 DIAGNOSIS — N40.1 BPH WITH OBSTRUCTION/LOWER URINARY TRACT SYMPTOMS: Primary | ICD-10-CM

## 2020-11-12 ENCOUNTER — OFFICE VISIT (OUTPATIENT)
Dept: OBGYN CLINIC | Facility: CLINIC | Age: 70
End: 2020-11-12
Payer: MEDICARE

## 2020-11-12 VITALS
BODY MASS INDEX: 28.04 KG/M2 | HEIGHT: 68 IN | DIASTOLIC BLOOD PRESSURE: 70 MMHG | HEART RATE: 63 BPM | WEIGHT: 185 LBS | SYSTOLIC BLOOD PRESSURE: 146 MMHG

## 2020-11-12 DIAGNOSIS — M18.12 ARTHRITIS OF CARPOMETACARPAL (CMC) JOINT OF LEFT THUMB: Primary | ICD-10-CM

## 2020-11-12 DIAGNOSIS — M19.041 LOCALIZED OSTEOARTHRITIS OF RIGHT HAND: ICD-10-CM

## 2020-11-12 PROCEDURE — 20600 DRAIN/INJ JOINT/BURSA W/O US: CPT | Performed by: SURGERY

## 2020-11-12 PROCEDURE — 99203 OFFICE O/P NEW LOW 30 MIN: CPT | Performed by: SURGERY

## 2020-11-12 RX ORDER — LIDOCAINE HYDROCHLORIDE 10 MG/ML
1 INJECTION, SOLUTION INFILTRATION; PERINEURAL
Status: COMPLETED | OUTPATIENT
Start: 2020-11-12 | End: 2020-11-12

## 2020-11-12 RX ORDER — LIDOCAINE HYDROCHLORIDE 10 MG/ML
0.5 INJECTION, SOLUTION INFILTRATION; PERINEURAL
Status: COMPLETED | OUTPATIENT
Start: 2020-11-12 | End: 2020-11-12

## 2020-11-12 RX ORDER — TRIAMCINOLONE ACETONIDE 40 MG/ML
20 INJECTION, SUSPENSION INTRA-ARTICULAR; INTRAMUSCULAR
Status: COMPLETED | OUTPATIENT
Start: 2020-11-12 | End: 2020-11-12

## 2020-11-12 RX ORDER — TRIAMCINOLONE ACETONIDE 40 MG/ML
40 INJECTION, SUSPENSION INTRA-ARTICULAR; INTRAMUSCULAR
Status: COMPLETED | OUTPATIENT
Start: 2020-11-12 | End: 2020-11-12

## 2020-11-12 RX ORDER — BUPIVACAINE HYDROCHLORIDE 2.5 MG/ML
0.5 INJECTION, SOLUTION INFILTRATION; PERINEURAL
Status: COMPLETED | OUTPATIENT
Start: 2020-11-12 | End: 2020-11-12

## 2020-11-12 RX ADMIN — TRIAMCINOLONE ACETONIDE 40 MG: 40 INJECTION, SUSPENSION INTRA-ARTICULAR; INTRAMUSCULAR at 09:43

## 2020-11-12 RX ADMIN — LIDOCAINE HYDROCHLORIDE 1 ML: 10 INJECTION, SOLUTION INFILTRATION; PERINEURAL at 09:43

## 2020-11-12 RX ADMIN — BUPIVACAINE HYDROCHLORIDE 0.5 ML: 2.5 INJECTION, SOLUTION INFILTRATION; PERINEURAL at 09:43

## 2020-11-12 RX ADMIN — LIDOCAINE HYDROCHLORIDE 0.5 ML: 10 INJECTION, SOLUTION INFILTRATION; PERINEURAL at 09:43

## 2020-11-12 RX ADMIN — TRIAMCINOLONE ACETONIDE 20 MG: 40 INJECTION, SUSPENSION INTRA-ARTICULAR; INTRAMUSCULAR at 09:43

## 2020-11-23 ENCOUNTER — REMOTE DEVICE CLINIC VISIT (OUTPATIENT)
Dept: CARDIOLOGY CLINIC | Facility: CLINIC | Age: 70
End: 2020-11-23
Payer: MEDICARE

## 2020-11-23 DIAGNOSIS — Z95.818 PRESENCE OF OTHER CARDIAC IMPLANTS AND GRAFTS: Primary | ICD-10-CM

## 2020-11-23 PROCEDURE — G2066 INTER DEVC REMOTE 30D: HCPCS | Performed by: INTERNAL MEDICINE

## 2020-11-23 PROCEDURE — 93298 REM INTERROG DEV EVAL SCRMS: CPT | Performed by: INTERNAL MEDICINE

## 2020-12-18 DIAGNOSIS — I48.3 TYPICAL ATRIAL FLUTTER (HCC): ICD-10-CM

## 2020-12-18 RX ORDER — METOPROLOL SUCCINATE 100 MG/1
TABLET, EXTENDED RELEASE ORAL
Qty: 90 TABLET | Refills: 0 | Status: SHIPPED | OUTPATIENT
Start: 2020-12-18 | End: 2021-03-15

## 2020-12-28 ENCOUNTER — OFFICE VISIT (OUTPATIENT)
Dept: OBGYN CLINIC | Facility: CLINIC | Age: 70
End: 2020-12-28
Payer: MEDICARE

## 2020-12-28 VITALS
SYSTOLIC BLOOD PRESSURE: 143 MMHG | BODY MASS INDEX: 28.04 KG/M2 | HEIGHT: 68 IN | WEIGHT: 185 LBS | DIASTOLIC BLOOD PRESSURE: 80 MMHG | HEART RATE: 81 BPM

## 2020-12-28 DIAGNOSIS — M19.041 DEGENERATIVE ARTHRITIS OF METACARPOPHALANGEAL JOINT OF MIDDLE FINGER OF RIGHT HAND: ICD-10-CM

## 2020-12-28 DIAGNOSIS — M18.12 ARTHRITIS OF CARPOMETACARPAL (CMC) JOINT OF LEFT THUMB: Primary | ICD-10-CM

## 2020-12-28 PROCEDURE — 99212 OFFICE O/P EST SF 10 MIN: CPT | Performed by: SURGERY

## 2021-01-08 ENCOUNTER — LAB (OUTPATIENT)
Dept: LAB | Facility: CLINIC | Age: 71
End: 2021-01-08
Payer: MEDICARE

## 2021-01-08 ENCOUNTER — TRANSCRIBE ORDERS (OUTPATIENT)
Dept: LAB | Facility: CLINIC | Age: 71
End: 2021-01-08

## 2021-01-08 DIAGNOSIS — N40.1 BPH WITH OBSTRUCTION/LOWER URINARY TRACT SYMPTOMS: ICD-10-CM

## 2021-01-08 DIAGNOSIS — N13.8 BPH WITH OBSTRUCTION/LOWER URINARY TRACT SYMPTOMS: ICD-10-CM

## 2021-01-08 DIAGNOSIS — Z13.6 SCREENING FOR ISCHEMIC HEART DISEASE: ICD-10-CM

## 2021-01-08 DIAGNOSIS — Z12.5 SPECIAL SCREENING FOR MALIGNANT NEOPLASM OF PROSTATE: Primary | ICD-10-CM

## 2021-01-08 DIAGNOSIS — E78.5 HYPERLIPIDEMIA, UNSPECIFIED HYPERLIPIDEMIA TYPE: ICD-10-CM

## 2021-01-08 LAB — PSA SERPL-MCNC: 0.8 NG/ML (ref 0–4)

## 2021-01-08 PROCEDURE — 36415 COLL VENOUS BLD VENIPUNCTURE: CPT

## 2021-01-08 PROCEDURE — 84153 ASSAY OF PSA TOTAL: CPT

## 2021-01-15 ENCOUNTER — APPOINTMENT (OUTPATIENT)
Dept: LAB | Facility: CLINIC | Age: 71
End: 2021-01-15
Payer: MEDICARE

## 2021-01-21 ENCOUNTER — OFFICE VISIT (OUTPATIENT)
Dept: UROLOGY | Facility: CLINIC | Age: 71
End: 2021-01-21
Payer: MEDICARE

## 2021-01-21 ENCOUNTER — TELEPHONE (OUTPATIENT)
Dept: UROLOGY | Facility: CLINIC | Age: 71
End: 2021-01-21

## 2021-01-21 VITALS
SYSTOLIC BLOOD PRESSURE: 130 MMHG | HEART RATE: 60 BPM | BODY MASS INDEX: 29.1 KG/M2 | WEIGHT: 192 LBS | DIASTOLIC BLOOD PRESSURE: 82 MMHG | HEIGHT: 68 IN

## 2021-01-21 DIAGNOSIS — E29.1 MALE HYPOGONADISM: Primary | ICD-10-CM

## 2021-01-21 PROCEDURE — 99213 OFFICE O/P EST LOW 20 MIN: CPT | Performed by: PHYSICIAN ASSISTANT

## 2021-01-21 NOTE — PROGRESS NOTES
UROLOGY PROGRESS NOTE   Patient Identifiers: Kendall Heard (MRN [id number])  Date of Service: 1/21/2021    Subjective:     27-year-old man with history of BPH and hypogonadism  He had a left orchiectomy 8 or 9 years ago  His PSA is 0 8  His testosterone has been low reading 201 on the last test   He has used AndroGel in the past and is now using home injection therapy on a monthly basis  H&H 16 2 and 47 6  Reason for visit:    BPH and male hypogonadism follow-up    Objective:     VITALS:    There were no vitals filed for this visit          LABS:  Lab Results   Component Value Date    HGB 15 6 01/15/2021    HCT 48 0 01/15/2021    WBC 7 13 01/15/2021     01/15/2021   ]    Lab Results   Component Value Date    K 3 8 01/15/2021     01/15/2021    CO2 27 01/15/2021    BUN 18 01/15/2021    CREATININE 1 00 01/15/2021    CALCIUM 8 6 01/15/2021   ]        INPATIENT MEDS:    Current Outpatient Medications:     albuterol (PROVENTIL HFA,VENTOLIN HFA) 90 mcg/act inhaler, Inhale 2 puffs every 6 (six) hours as needed, Disp: , Rfl:     aspirin 81 MG tablet, Take 81 mg by mouth daily, Disp: , Rfl:     atorvastatin (LIPITOR) 40 mg tablet, TAKE 1 TABLET BY MOUTH EVERY DAY AT NIGHT, Disp: , Rfl: 1    BREO ELLIPTA 100-25 MCG/INH inhaler, , Disp: , Rfl:     diclofenac sodium (VOLTAREN) 1 %, Apply 2 g topically 3 (three) times a day as needed (Hand pain), Disp: 1 Tube, Rfl: 2    fluticasone (FLONASE) 50 mcg/act nasal spray, SPRAY 2 SPRAYS INTO EACH NOSTRIL EVERY DAY, Disp: , Rfl:     fluticasone-vilanterol (Breo Ellipta) 100-25 mcg/inh inhaler, Inhale 1 puff daily, Disp: , Rfl:     loratadine (CLARITIN) 10 mg tablet, Take 10 mg by mouth as needed , Disp: , Rfl:     metoprolol succinate (TOPROL-XL) 100 mg 24 hr tablet, TAKE ONE TABLET BY MOUTH EVERY DAY, Disp: 90 tablet, Rfl: 0    pravastatin (PRAVACHOL) 40 mg tablet, TAKE 1 TABLET BY MOUTH EVERY DAY AT NIGHT, Disp: , Rfl:     predniSONE 10 mg tablet, TAKE 3 TABLETS DAILY FOR 5 DAYS, Disp: , Rfl:     tadalafil (CIALIS) 20 MG tablet, daily as needed  , Disp: , Rfl:     tadalafil (CIALIS) 20 MG tablet, Take 1 tablet (20 mg total) by mouth daily as needed for erectile dysfunction for up to 6 doses, Disp: 6 tablet, Rfl: 6    testosterone cypionate (DEPO-TESTOSTERONE) 200 mg/mL SOLN, INJECT 1 5 ML EVERY 28 DAYS, Disp: , Rfl: 5    vitamin E, tocopherol, 400 units capsule, Take 400 Units by mouth daily, Disp: , Rfl:       Physical Exam:   There were no vitals taken for this visit  GEN: no acute distress    RESP: breathing comfortably with no accessory muscle use    ABD: soft, non-tender, non-distended   INCISION:    EXT: no significant peripheral edema   (Male): Penis circumcised, phallus normal, meatus patent  Testicles descended into scrotum bilaterally without masses nor tenderness  No inguinal hernias bilaterally  CHLOÉ: Prostate is not enlarged at 28 grams  The prostate is not boggy  The prostate is not tender  No nodules noted      RADIOLOGY:    none     Assessment:    1   BPH   2  Male hypogonadism     Plan:   - he wishes to start Aveed  Injections as soon as possible  - will see if prior authorizations required and schedule him accordingly  -  -

## 2021-01-24 ENCOUNTER — IMMUNIZATIONS (OUTPATIENT)
Dept: FAMILY MEDICINE CLINIC | Facility: HOSPITAL | Age: 71
End: 2021-01-24

## 2021-01-24 DIAGNOSIS — Z23 ENCOUNTER FOR IMMUNIZATION: Primary | ICD-10-CM

## 2021-01-24 PROCEDURE — 0011A SARS-COV-2 / COVID-19 MRNA VACCINE (MODERNA) 100 MCG: CPT

## 2021-01-24 PROCEDURE — 91301 SARS-COV-2 / COVID-19 MRNA VACCINE (MODERNA) 100 MCG: CPT

## 2021-01-26 ENCOUNTER — TELEPHONE (OUTPATIENT)
Dept: UROLOGY | Facility: CLINIC | Age: 71
End: 2021-01-26

## 2021-01-26 NOTE — TELEPHONE ENCOUNTER
----- Message from Tatyana Ritter PA-C sent at 1/26/2021  2:17 PM EST -----   According to Nichelle Beltrán the patient does not need pre authorization so he can be scheduled for of the injection with me as soon as possible

## 2021-02-03 ENCOUNTER — OFFICE VISIT (OUTPATIENT)
Dept: UROLOGY | Facility: CLINIC | Age: 71
End: 2021-02-03
Payer: MEDICARE

## 2021-02-03 VITALS
WEIGHT: 185 LBS | BODY MASS INDEX: 28.04 KG/M2 | HEIGHT: 68 IN | DIASTOLIC BLOOD PRESSURE: 66 MMHG | HEART RATE: 79 BPM | SYSTOLIC BLOOD PRESSURE: 100 MMHG

## 2021-02-03 DIAGNOSIS — E29.1 MALE HYPOGONADISM: Primary | ICD-10-CM

## 2021-02-03 PROCEDURE — 99213 OFFICE O/P EST LOW 20 MIN: CPT | Performed by: PHYSICIAN ASSISTANT

## 2021-02-03 PROCEDURE — 96372 THER/PROPH/DIAG INJ SC/IM: CPT

## 2021-02-03 NOTE — PROGRESS NOTES
Assessment:     1  Male hypogonadism      Plan:     - Aveed injection in 4 weeks then every 10 weeks thereafter     Diagnoses and all orders for this visit:    Male hypogonadism  -     testosterone undecanoate (AVEED) intramuscular injection SOLN 750 mg          Subjective:     Patient ID: Champ Whitlock is a 79 y o  male  Male with history of BPH and hypogonadism  He had a left orchiectomy 8 or 9 years ago  His low testosterone reading was 201 had been on AndroGel in the past and is now using home injection therapy on a monthly basis  H&H was 16 2 and 47 6  He switched to  Aveed  For more efficacy and convenience  Risk and side effects as well as pre treatment laboratory studies were again reviewed  HPI    Review of Systems        INJECTION: After obtaining consent, and per orders , injection of Aveed  750MG  Given in the right GM  by Stephanie Carvalho PA-C  Patient instructed to remain in clinic for 20 minutes afterwards, and to report any adverse reaction to me immediately       Physical Exam

## 2021-02-20 ENCOUNTER — IMMUNIZATIONS (OUTPATIENT)
Dept: FAMILY MEDICINE CLINIC | Facility: HOSPITAL | Age: 71
End: 2021-02-20

## 2021-02-20 DIAGNOSIS — Z23 ENCOUNTER FOR IMMUNIZATION: Primary | ICD-10-CM

## 2021-02-20 PROCEDURE — 0012A SARS-COV-2 / COVID-19 MRNA VACCINE (MODERNA) 100 MCG: CPT

## 2021-02-20 PROCEDURE — 91301 SARS-COV-2 / COVID-19 MRNA VACCINE (MODERNA) 100 MCG: CPT

## 2021-02-22 ENCOUNTER — REMOTE DEVICE CLINIC VISIT (OUTPATIENT)
Dept: CARDIOLOGY CLINIC | Facility: CLINIC | Age: 71
End: 2021-02-22
Payer: MEDICARE

## 2021-02-22 DIAGNOSIS — Z95.818 PRESENCE OF OTHER CARDIAC IMPLANTS AND GRAFTS: Primary | ICD-10-CM

## 2021-02-22 PROCEDURE — G2066 INTER DEVC REMOTE 30D: HCPCS | Performed by: INTERNAL MEDICINE

## 2021-02-22 PROCEDURE — 93298 REM INTERROG DEV EVAL SCRMS: CPT | Performed by: INTERNAL MEDICINE

## 2021-02-22 NOTE — PROGRESS NOTES
Results for orders placed or performed in visit on 02/22/21   Cardiac EP device report    Narrative    MDT ILR/ ACTIVE SYSTEM IS MRI CONDITIONAL  CARELINK TRANSMISSION: BATTERY STATUS "OK"  DEVICE CLASSIFIED 9 TACHY EPISODES DUE TO OVERSENSING NOISE  NO PT ACTIVATED EPISODES  PRESENTING RHYTHM NSR  NORMAL DEVICE FUNCTION   GV

## 2021-03-03 ENCOUNTER — OFFICE VISIT (OUTPATIENT)
Dept: UROLOGY | Facility: CLINIC | Age: 71
End: 2021-03-03
Payer: MEDICARE

## 2021-03-03 VITALS
BODY MASS INDEX: 27.25 KG/M2 | DIASTOLIC BLOOD PRESSURE: 64 MMHG | HEIGHT: 69 IN | SYSTOLIC BLOOD PRESSURE: 110 MMHG | WEIGHT: 184 LBS

## 2021-03-03 DIAGNOSIS — E29.1 MALE HYPOGONADISM: Primary | ICD-10-CM

## 2021-03-03 PROCEDURE — 96372 THER/PROPH/DIAG INJ SC/IM: CPT

## 2021-03-03 NOTE — PROGRESS NOTES
Assessment:    1  Male hypogonadism      Plan:     -Aveed injection every 10 weeks     Diagnoses and all orders for this visit:    Male hypogonadism  -     testosterone undecanoate (AVEED) intramuscular injection SOLN 750 mg          Subjective:     Patient ID: Stephanie Rojas is a 79 y o  male  with hypogonadism  He had an orchiectomy 8  Or 9 years ago  He ha no side effects from the first Aveed shot  HPI    Review of Systems       INJECTION:After obtaining consent, and per orders , injection of Aveed 750mg given in the left GM  by Jalil Fierro PA-C  Patient instructed  to report any adverse reaction to me immediately       Physical Exam

## 2021-03-13 DIAGNOSIS — I48.3 TYPICAL ATRIAL FLUTTER (HCC): ICD-10-CM

## 2021-03-15 RX ORDER — METOPROLOL SUCCINATE 100 MG/1
TABLET, EXTENDED RELEASE ORAL
Qty: 90 TABLET | Refills: 0 | Status: SHIPPED | OUTPATIENT
Start: 2021-03-15 | End: 2021-06-21

## 2021-05-13 ENCOUNTER — OFFICE VISIT (OUTPATIENT)
Dept: UROLOGY | Facility: CLINIC | Age: 71
End: 2021-05-13
Payer: MEDICARE

## 2021-05-13 ENCOUNTER — TELEPHONE (OUTPATIENT)
Dept: UROLOGY | Facility: CLINIC | Age: 71
End: 2021-05-13

## 2021-05-13 VITALS
WEIGHT: 189 LBS | HEART RATE: 68 BPM | SYSTOLIC BLOOD PRESSURE: 122 MMHG | DIASTOLIC BLOOD PRESSURE: 80 MMHG | BODY MASS INDEX: 27.99 KG/M2 | HEIGHT: 69 IN

## 2021-05-13 DIAGNOSIS — N52.9 ERECTILE DYSFUNCTION, UNSPECIFIED ERECTILE DYSFUNCTION TYPE: Primary | ICD-10-CM

## 2021-05-13 PROCEDURE — 96372 THER/PROPH/DIAG INJ SC/IM: CPT | Performed by: PHYSICIAN ASSISTANT

## 2021-05-13 PROCEDURE — 99212 OFFICE O/P EST SF 10 MIN: CPT | Performed by: PHYSICIAN ASSISTANT

## 2021-05-13 RX ORDER — SILDENAFIL 100 MG/1
100 TABLET, FILM COATED ORAL DAILY PRN
Qty: 10 TABLET | Refills: 3 | Status: SHIPPED | OUTPATIENT
Start: 2021-05-13 | End: 2022-03-22 | Stop reason: ALTCHOICE

## 2021-05-13 NOTE — PROGRESS NOTES
Assessment:  1  Male hypogonadism      Plan:     - follow up in 10 weeks for his next Aveed injection     Diagnoses and all orders for this visit:    Erectile dysfunction, unspecified erectile dysfunction type  -     sildenafil (VIAGRA) 100 mg tablet; Take 1 tablet (100 mg total) by mouth daily as needed for erectile dysfunction  -     testosterone undecanoate (AVEED) intramuscular injection SOLN 750 mg          Subjective:     Patient ID: Braden Leija is a 70 y o  male  with male hypogonadism  He gets testosterone replacement injections every 10 weeks  No reported adverse reactions  he had an orchiectomy 8-9 years ago  HPI    Review of Systems      INJECTION:After obtaining consent, and per orders injection of Aveed 750mg  given in the right GM by Zachary Atwood PA-C  Patient instructed to report any adverse reaction to me immediately       Physical Exam

## 2021-05-24 ENCOUNTER — TELEPHONE (OUTPATIENT)
Dept: GASTROENTEROLOGY | Facility: CLINIC | Age: 71
End: 2021-05-24

## 2021-05-24 NOTE — TELEPHONE ENCOUNTER
Sent Dr Vadim Flores a message through [employer] regarding cardiac clearance  Will call their office in one week if do not receive response, as I did not know if to send to a clinical team instead

## 2021-05-24 NOTE — TELEPHONE ENCOUNTER
Pt is requesting a call back to let him know if there is any out of pocket expense for his colon scheduled on 6/11/21  He said he will not be purchasing his prep until he receives a call  Thank you so much!

## 2021-05-24 NOTE — TELEPHONE ENCOUNTER
Our mutual patient is scheduled for procedure: On: ___6_/11    _/21_    _      With: Dr Fleming_________    He/She is taking the following blood thinner:   NONE/ N/A       Pt informed that he had an ablation about 3 years ago has a loop recorder  Please advise if pt is cleared for his Colonoscopy  Thank you for your help!       Physician Approving clearance: ________________________

## 2021-05-24 NOTE — TELEPHONE ENCOUNTER
Reviewed pt's last colon and bx  Pt has hx of colon polyps/diverticulosis  I will call pt back to explain and schedule

## 2021-05-25 NOTE — TELEPHONE ENCOUNTER
Ezell Paget I just spokt to pt and he understands his financial respons    He will check his email for prep instructions   Thanks donte wood

## 2021-05-27 ENCOUNTER — OFFICE VISIT (OUTPATIENT)
Dept: OBGYN CLINIC | Facility: CLINIC | Age: 71
End: 2021-05-27
Payer: MEDICARE

## 2021-05-27 VITALS
WEIGHT: 186 LBS | HEART RATE: 58 BPM | DIASTOLIC BLOOD PRESSURE: 72 MMHG | SYSTOLIC BLOOD PRESSURE: 131 MMHG | HEIGHT: 69 IN | BODY MASS INDEX: 27.55 KG/M2

## 2021-05-27 DIAGNOSIS — M19.041 DEGENERATIVE ARTHRITIS OF METACARPOPHALANGEAL JOINT OF MIDDLE FINGER OF RIGHT HAND: Primary | ICD-10-CM

## 2021-05-27 DIAGNOSIS — M18.12 ARTHRITIS OF CARPOMETACARPAL (CMC) JOINT OF LEFT THUMB: ICD-10-CM

## 2021-05-27 PROCEDURE — 99214 OFFICE O/P EST MOD 30 MIN: CPT | Performed by: SURGERY

## 2021-05-27 PROCEDURE — 20600 DRAIN/INJ JOINT/BURSA W/O US: CPT | Performed by: SURGERY

## 2021-05-27 RX ORDER — LIDOCAINE HYDROCHLORIDE 10 MG/ML
1 INJECTION, SOLUTION INFILTRATION; PERINEURAL
Status: COMPLETED | OUTPATIENT
Start: 2021-05-27 | End: 2021-05-27

## 2021-05-27 RX ORDER — BUPIVACAINE HYDROCHLORIDE 2.5 MG/ML
0.5 INJECTION, SOLUTION INFILTRATION; PERINEURAL
Status: COMPLETED | OUTPATIENT
Start: 2021-05-27 | End: 2021-05-27

## 2021-05-27 RX ORDER — TRIAMCINOLONE ACETONIDE 40 MG/ML
20 INJECTION, SUSPENSION INTRA-ARTICULAR; INTRAMUSCULAR
Status: COMPLETED | OUTPATIENT
Start: 2021-05-27 | End: 2021-05-27

## 2021-05-27 RX ADMIN — BUPIVACAINE HYDROCHLORIDE 0.5 ML: 2.5 INJECTION, SOLUTION INFILTRATION; PERINEURAL at 12:38

## 2021-05-27 RX ADMIN — TRIAMCINOLONE ACETONIDE 20 MG: 40 INJECTION, SUSPENSION INTRA-ARTICULAR; INTRAMUSCULAR at 12:05

## 2021-05-27 RX ADMIN — LIDOCAINE HYDROCHLORIDE 1 ML: 10 INJECTION, SOLUTION INFILTRATION; PERINEURAL at 12:05

## 2021-05-27 NOTE — PROGRESS NOTES
ASSESSMENT/PLAN:      79year old male who is following up for Acute exacerbation of left chronicCMC arthritis and acute  Exacerbation of chronicright long finger MCP joint arthritis  He was given injections into the left thumb CMC joint and right long MCP joint in November 2020 with about 6 months of relief  He is here today for repeat injections  Given repeat injections into the left thumb CMC joint and right long MCP joint  See procedure note below  The patient verbalized understanding of exam findings and treatment plan  We engaged in the shared decision-making process and treatment options were discussed at length with the patient  Surgical and conservative management discussed today along with risks and benefits  Diagnoses and all orders for this visit:    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    Arthritis of carpometacarpal (CMC) joint of left thumb          Follow Up:  Return if symptoms worsen or fail to improve  To Do Next Visit:  Re-evaluation of current issue    ____________________________________________________________________________________________________________________________________________      CHIEF COMPLAINT:  Chief Complaint   Patient presents with    Left Hand - Pain, Follow-up    Right Hand - Pain, Follow-up       SUBJECTIVE:  Rosemary Heredia is a 70y o  year old RHD male who presents for follow up of left thumb CMC joint arthritis and right long finger MCP joint arthritis  We gave him an injection to both of the above arthritic joints in November which provided about 6 months of relief  His pain and joint stiffness is starting to return  He is here today because he would like repeat injections  I have personally reviewed all the relevant PMH, PSH, SH, FH, Medications and allergies       PAST MEDICAL HISTORY:  Past Medical History:   Diagnosis Date    Arthritis     Erectile dysfunction     Hypertension     Spinal stenosis        PAST SURGICAL HISTORY:  Past Surgical History:   Procedure Laterality Date    CARDIAC ELECTROPHYSIOLOGY MAPPING AND ABLATION      ORCHIECTOMY Left 2006    VASECTOMY         FAMILY HISTORY:  Family History   Problem Relation Age of Onset    Lung cancer Mother     Colon cancer Mother        SOCIAL HISTORY:  Social History     Tobacco Use    Smoking status: Former Smoker    Smokeless tobacco: Never Used   Substance Use Topics    Alcohol use: No    Drug use: No       MEDICATIONS:    Current Outpatient Medications:     albuterol (PROVENTIL HFA,VENTOLIN HFA) 90 mcg/act inhaler, Inhale 2 puffs every 6 (six) hours as needed, Disp: , Rfl:     aspirin 81 MG tablet, Take 81 mg by mouth daily, Disp: , Rfl:     BREO ELLIPTA 100-25 MCG/INH inhaler, , Disp: , Rfl:     diclofenac sodium (VOLTAREN) 1 %, Apply 2 g topically 3 (three) times a day as needed (Hand pain), Disp: 1 Tube, Rfl: 2    fluticasone (FLONASE) 50 mcg/act nasal spray, SPRAY 2 SPRAYS INTO EACH NOSTRIL EVERY DAY, Disp: , Rfl:     fluticasone-vilanterol (Breo Ellipta) 100-25 mcg/inh inhaler, Inhale 1 puff daily, Disp: , Rfl:     loratadine (CLARITIN) 10 mg tablet, Take 10 mg by mouth as needed , Disp: , Rfl:     metoprolol succinate (TOPROL-XL) 100 mg 24 hr tablet, TAKE ONE TABLET BY MOUTH EVERY DAY, Disp: 90 tablet, Rfl: 0    pravastatin (PRAVACHOL) 40 mg tablet, TAKE 1 TABLET BY MOUTH EVERY DAY AT NIGHT, Disp: , Rfl:     sildenafil (VIAGRA) 100 mg tablet, Take 1 tablet (100 mg total) by mouth daily as needed for erectile dysfunction, Disp: 10 tablet, Rfl: 3    tadalafil (CIALIS) 20 MG tablet, Take 1 tablet (20 mg total) by mouth daily as needed for erectile dysfunction for up to 6 doses, Disp: 6 tablet, Rfl: 6    testosterone cypionate (DEPO-TESTOSTERONE) 200 mg/mL SOLN, INJECT 1 5 ML EVERY 28 DAYS, Disp: , Rfl: 5    vitamin E, tocopherol, 400 units capsule, Take 400 Units by mouth daily, Disp: , Rfl:     ALLERGIES:  Allergies Allergen Reactions    Atorvastatin Other (See Comments)     Urinary retention       REVIEW OF SYSTEMS:  Review of Systems    VITALS:  Vitals:    05/27/21 1143   BP: 131/72   Pulse: 58       LABS:  HgA1c: No results found for: HGBA1C  BMP:   Lab Results   Component Value Date    CALCIUM 8 6 01/15/2021    K 3 8 01/15/2021    CO2 27 01/15/2021     01/15/2021    BUN 18 01/15/2021    CREATININE 1 00 01/15/2021       _____________________________________________________  PHYSICAL EXAMINATION:  General: well developed and well nourished, alert, oriented times 3 and appears comfortable  Psychiatric: Normal  HEENT: Normocephalic, Atraumatic Trachea Midline, No torticollis  Pulmonary: No audible wheezing or respiratory distress   Cardiovascular: No pitting edema, 2+ radial pulse   Abdominal/GI: abdomen non tender, non distended   Skin: No masses, erythema, lacerations, fluctation, ulcerations  Neurovascular: Sensation Intact to the Median, Ulnar, Radial Nerve, Motor Intact to the Median, Ulnar, Radial Nerve and Pulses Intact  Musculoskeletal: Normal, except as noted in detailed exam and in HPI  MUSCULOSKELETAL EXAMINATION:  left CMC Exam:  No adduction contracture  No hyperextension deformity of MCP joint  Positive localized tenderness over radial and dorsal aspect of thumb (CMC joint)  Grind test is Negative for pain and Negative for crepitus  Metacarpal load shift test negative  No triggering or tenderness over the A1 pulley  no pain with Finkelsteins maneuver     Right long finger MCP joint:   No erythema  Mild swelling of the long MCP joint noted     Tender to palpation of MCP joint  Unable to make a full composite fist due to limited flexion of the long MCP joint, lacking about 15 degrees of full flexion              ___________________________________________________  STUDIES REVIEWED:  No new imaging      PROCEDURES PERFORMED:  Small joint arthrocentesis: L thumb CMC  [place] Protocol:  Consent: Verbal consent obtained  Consent given by: patient  Patient understanding: patient states understanding of the procedure being performed  Radiology Images displayed and confirmed  If images not available, report reviewed: imaging studies available  Patient identity confirmed: verbally with patient    Supporting Documentation  Indications: pain   Procedure Details  Location: thumb - L thumb CMC  Preparation: Patient was prepped and draped in the usual sterile fashion  Needle size: 25 G  Ultrasound guidance: no  Approach: dorsal  Medications administered: 0 5 mL bupivacaine 0 25 %; 1 mL lidocaine 1 %; 20 mg triamcinolone acetonide 40 mg/mL    Patient tolerance: patient tolerated the procedure well with no immediate complications  Dressing:  Sterile dressing applied    Small joint arthrocentesis: R long MCP  Universal Protocol:  Consent: Verbal consent obtained    Risks and benefits: risks, benefits and alternatives were discussed  Consent given by: patient  Patient understanding: patient states understanding of the procedure being performed  Patient identity confirmed: verbally with patient    Supporting Documentation  Indications: pain   Procedure Details  Location: long finger - R long MCP  Preparation: Patient was prepped and draped in the usual sterile fashion  Needle size: 25 G  Approach: dorsal  Medications administered: 0 5 mL bupivacaine 0 25 %; 1 mL lidocaine 1 %; 20 mg triamcinolone acetonide 40 mg/mL    Patient tolerance: patient tolerated the procedure well with no immediate complications  Dressing:  Sterile dressing applied             _____________________________________________________      Scribe Attestation    I,:  Twila Stephens PA-C am acting as a scribe while in the presence of the attending physician :       I,:  Bela Restrepo MD personally performed the services described in this documentation    as scribed in my presence :

## 2021-06-07 LAB — EXT SARS-COV-2: NOT DETECTED

## 2021-06-11 ENCOUNTER — HOSPITAL ENCOUNTER (OUTPATIENT)
Dept: GASTROENTEROLOGY | Facility: AMBULATORY SURGERY CENTER | Age: 71
Discharge: HOME | End: 2021-06-11
Payer: MEDICARE

## 2021-06-11 ENCOUNTER — ANESTHESIA (OUTPATIENT)
Dept: GASTROENTEROLOGY | Facility: AMBULATORY SURGERY CENTER | Age: 71
End: 2021-06-11

## 2021-06-11 ENCOUNTER — ANESTHESIA EVENT (OUTPATIENT)
Dept: GASTROENTEROLOGY | Facility: AMBULATORY SURGERY CENTER | Age: 71
End: 2021-06-11

## 2021-06-11 VITALS
SYSTOLIC BLOOD PRESSURE: 115 MMHG | HEIGHT: 68 IN | TEMPERATURE: 97.2 F | BODY MASS INDEX: 28.04 KG/M2 | OXYGEN SATURATION: 95 % | WEIGHT: 185 LBS | DIASTOLIC BLOOD PRESSURE: 70 MMHG | HEART RATE: 72 BPM | RESPIRATION RATE: 18 BRPM

## 2021-06-11 DIAGNOSIS — Z80.0 FAMILY HISTORY OF COLON CANCER IN MOTHER: ICD-10-CM

## 2021-06-11 DIAGNOSIS — K57.90 DIVERTICULOSIS: ICD-10-CM

## 2021-06-11 DIAGNOSIS — Z86.010 HX OF COLONIC POLYPS: ICD-10-CM

## 2021-06-11 PROCEDURE — 45380 COLONOSCOPY AND BIOPSY: CPT | Performed by: INTERNAL MEDICINE

## 2021-06-11 PROCEDURE — 88305 TISSUE EXAM BY PATHOLOGIST: CPT | Performed by: PATHOLOGY

## 2021-06-11 PROCEDURE — 99100 ANES PT EXTEME AGE<1 YR&>70: CPT | Performed by: NURSE ANESTHETIST, CERTIFIED REGISTERED

## 2021-06-11 PROCEDURE — 00811 ANES LWR INTST NDSC NOS: CPT | Performed by: NURSE ANESTHETIST, CERTIFIED REGISTERED

## 2021-06-11 RX ORDER — SODIUM CHLORIDE 9 MG/ML
30 INJECTION, SOLUTION INTRAVENOUS CONTINUOUS
Status: DISCONTINUED | OUTPATIENT
Start: 2021-06-11 | End: 2021-06-15 | Stop reason: HOSPADM

## 2021-06-11 RX ORDER — SODIUM CHLORIDE 9 MG/ML
INJECTION, SOLUTION INTRAVENOUS CONTINUOUS PRN
Status: DISCONTINUED | OUTPATIENT
Start: 2021-06-11 | End: 2021-06-11

## 2021-06-11 RX ORDER — SODIUM CHLORIDE 9 MG/ML
20 INJECTION, SOLUTION INTRAVENOUS CONTINUOUS
Status: DISCONTINUED | OUTPATIENT
Start: 2021-06-11 | End: 2021-06-15 | Stop reason: HOSPADM

## 2021-06-11 RX ORDER — PROPOFOL 10 MG/ML
INJECTION, EMULSION INTRAVENOUS AS NEEDED
Status: DISCONTINUED | OUTPATIENT
Start: 2021-06-11 | End: 2021-06-11

## 2021-06-11 RX ADMIN — PROPOFOL 20 MG: 10 INJECTION, EMULSION INTRAVENOUS at 14:38

## 2021-06-11 RX ADMIN — PROPOFOL 30 MG: 10 INJECTION, EMULSION INTRAVENOUS at 14:42

## 2021-06-11 RX ADMIN — PROPOFOL 30 MG: 10 INJECTION, EMULSION INTRAVENOUS at 14:57

## 2021-06-11 RX ADMIN — PROPOFOL 20 MG: 10 INJECTION, EMULSION INTRAVENOUS at 14:40

## 2021-06-11 RX ADMIN — PROPOFOL 30 MG: 10 INJECTION, EMULSION INTRAVENOUS at 14:53

## 2021-06-11 RX ADMIN — PROPOFOL 30 MG: 10 INJECTION, EMULSION INTRAVENOUS at 14:50

## 2021-06-11 RX ADMIN — PROPOFOL 30 MG: 10 INJECTION, EMULSION INTRAVENOUS at 14:44

## 2021-06-11 RX ADMIN — SODIUM CHLORIDE: 9 INJECTION, SOLUTION INTRAVENOUS at 14:01

## 2021-06-11 RX ADMIN — PROPOFOL 80 MG: 10 INJECTION, EMULSION INTRAVENOUS at 14:36

## 2021-06-11 RX ADMIN — PROPOFOL 30 MG: 10 INJECTION, EMULSION INTRAVENOUS at 14:47

## 2021-06-11 NOTE — ANESTHESIA PREPROCEDURE EVALUATION
Procedure:  COLONOSCOPY    Relevant Problems   CARDIO   (+) RBBB   (+) Typical atrial flutter (HCC)        Physical Exam    Airway    Mallampati score: II  TM Distance: >3 FB  Neck ROM: full     Dental       Cardiovascular  Rhythm: regular, Rate: normal, Cardiovascular exam normal    Pulmonary  Pulmonary exam normal     Other Findings        Anesthesia Plan  ASA Score- 2     Anesthesia Type- IV sedation with anesthesia with ASA Monitors  Additional Monitors:   Airway Plan:           Plan Factors-Exercise tolerance (METS): >4 METS  Chart reviewed  EKG reviewed  Imaging results reviewed  Existing labs reviewed  Patient summary reviewed  Induction- intravenous  Postoperative Plan-     Informed Consent- Anesthetic plan and risks discussed with patient

## 2021-06-11 NOTE — DISCHARGE INSTRUCTIONS
Colonoscopy   WHAT YOU NEED TO KNOW:   A colonoscopy is a procedure to examine the inside of your colon (intestine) with a scope  Polyps or tissue growths may have been removed during your colonoscopy  It is normal to feel bloated and to have some abdominal discomfort  You should be passing gas  If you have hemorrhoids or you had polyps removed, you may have a small amount of bleeding  DISCHARGE INSTRUCTIONS:   Seek care immediately if:    You have sudden, severe abdominal pain   You have problems swallowing   You have a large amount of black, sticky bowel movements or blood in your bowel movements   You have sudden trouble breathing   You feel weak, lightheaded, or faint or your heart beats faster than normal for you  Contact your healthcare provider if:    You have a fever and chills   You have nausea or are vomiting   Your abdomen is bloated or feels full and hard   You have abdominal pain   You have black, sticky bowel movements or blood in your bowel movements   You have not had a bowel movement for 3 days after your procedure   You have rash or hives   You have questions or concerns about your procedure  Activity:    Do not lift, strain, or run for 24 hours after your procedure   Rest after your procedure  You have been given medicine to relax you  Do not drive or make important decisions until the day after your procedure  Return to your normal activity as directed   Relieve gas and discomfort from bloating by lying on your right side with a heating pad on your abdomen  You may need to take short walks to help the gas move out  Eat small meals until bloating is relieved  Follow up with your healthcare provider as directed: Write down your questions so you remember to ask them during your visits  If you take a blood thinner, please review the specific instructions from your endoscopist about when you should resume it   These can be found in the Recommendation and Your Medication list sections of this After Visit Summary  High Fiber Diet   WHAT YOU NEED TO KNOW:   What is a high-fiber diet? A high-fiber diet includes foods that have a high amount of fiber  Fiber is the part of fruits, vegetables, and grains that is not broken down by your body  Fiber keeps your bowel movements regular  Fiber can also help lower your cholesterol level, control blood sugar in people with diabetes, and relieve constipation  Fiber can also help you control your weight because it helps you feel full faster  Most adults should eat 25 to 35 grams of fiber each day  Talk to your dietitian or healthcare provider about the amount of fiber you need  What foods are good sources of fiber? · Foods with at least 4 grams of fiber per serving:      ? ? to ½ cup of high-fiber cereal (check the nutrition label on the box)    ? ½ cup of blackberries or raspberries    ? 4 dried prunes    ? 1 cooked artichoke    ? ½ cup of cooked legumes, such as lentils, or red, kidney, and alonzo beans    · Foods with 1 to 3 grams of fiber per serving:      ? 1 slice of whole-wheat, pumpernickel, or rye bread    ? ½ cup of cooked brown rice    ? 4 whole-wheat crackers    ? 1 cup of oatmeal    ? ½ cup of cereal with 1 to 3 grams of fiber per serving (check the nutrition label on the box)    ? 1 small piece of fruit, such as an apple, banana, pear, kiwi, or orange    ? 3 dates    ? ½ cup of canned apricots, fruit cocktail, peaches, or pears    ? ½ cup of raw or cooked vegetables, such as carrots, cauliflower, cabbage, spinach, squash, or corn  What are some ways that I can increase fiber in my diet? · Choose brown or wild rice instead of white rice  · Use whole wheat flour in recipes instead of white or all-purpose flour  · Add beans and peas to casseroles or soups  · Choose fresh fruit and vegetables with peels or skins on instead of juices      What other guidelines should I follow? · Add fiber to your diet slowly  You may have abdominal discomfort, bloating, and gas if you add fiber to your diet too quickly  · Drink plenty of liquids as you add fiber to your diet  You may have nausea or develop constipation if you do not drink enough water  Ask how much liquid to drink each day and which liquids are best for you  CARE AGREEMENT:   You have the right to help plan your care  Discuss treatment options with your healthcare provider to decide what care you want to receive  You always have the right to refuse treatment  The above information is an  only  It is not intended as medical advice for individual conditions or treatments  Talk to your doctor, nurse or pharmacist before following any medical regimen to see if it is safe and effective for you  © Copyright [address] Information is for End User's use only and may not be sold, redistributed or otherwise used for commercial purposes  All illustrations and images included in CareNotes® are the copyrighted property of A D A M , Inc  or [facility] Aylin Sheldon   Diverticulosis   WHAT YOU NEED TO KNOW:   Diverticulosis is a condition that causes small pockets called diverticula to form in your intestine  These pockets make it difficult for bowel movements to pass through your digestive system  DISCHARGE INSTRUCTIONS:   Return to the emergency department if:   · You have severe pain on the left side of your lower abdomen  · Your bowel movements are bright or dark red  Contact your healthcare provider if:   · You have a fever and chills  · You feel dizzy or lightheaded  · You have nausea, or you are vomiting  · You have a change in your bowel movements  · You have questions or concerns about your condition or care  Medicines:   · Medicines  to soften your bowel movements may be given  You may also need medicines to treat symptoms such as bloating and pain      · Take your medicine as directed  Contact your healthcare provider if you think your medicine is not helping or if you have side effects  Tell him or her if you are allergic to any medicine  Keep a list of the medicines, vitamins, and herbs you take  Include the amounts, and when and why you take them  Bring the list or the pill bottles to follow-up visits  Carry your medicine list with you in case of an emergency  Self-care: The goal of treatment is to manage any symptoms you have and prevent other problems such as diverticulitis  Diverticulitis is swelling or infection of the diverticula  Your healthcare provider may recommend any of the following:  · Eat a variety of high-fiber foods  High-fiber foods help you have regular bowel movements  High-fiber foods include cooked beans, fruits, vegetables, and some cereals  Most adults need 25 to 35 grams of fiber each day  Your healthcare provider may recommend that you have more  Ask your healthcare provider how much fiber you need  Increase fiber slowly  You may have abdominal discomfort, bloating, and gas if you add fiber to your diet too quickly  You may need to take a fiber supplement if you are not getting enough fiber from food  · Drink liquids as directed  You may need to drink 2 to 3 liters (8 to 12 cups) of liquids every day  Ask your healthcare provider how much liquid to drink each day and which liquids are best for you  · Apply heat  on your abdomen for 20 to 30 minutes every 2 hours for as many days as directed  Heat helps decrease pain and muscle spasms  Help prevent diverticulitis or other symptoms: The following may help decrease your risk for diverticulitis or symptoms, such as bleeding  Talk to your provider about these or other things you can do to prevent problems that may occur with diverticulosis  · Exercise regularly  Ask your healthcare provider about the best exercise plan for you  Exercise can help you have regular bowel movements   Get 30 minutes of exercise on most days of the week  · Maintain a healthy weight  Ask your healthcare provider how much you should weigh  Ask him or her to help you create a weight loss plan if you are overweight  · Do not smoke  Nicotine and other chemicals in cigarettes increase your risk for diverticulitis  Ask your healthcare provider for information if you currently smoke and need help to quit  E-cigarettes or smokeless tobacco still contain nicotine  Talk to your healthcare provider before you use these products  · Ask your healthcare provider if it is safe to take NSAIDs  NSAIDs may increase your risk of diverticulitis  Follow up with your healthcare provider as directed:  Write down your questions so you remember to ask them during your visits  © Copyright [address] Information is for End User's use only and may not be sold, redistributed or otherwise used for commercial purposes  All illustrations and images included in CareNotes® are the copyrighted property of A D A M , Inc  or [employer]   The above information is an  only  It is not intended as medical advice for individual conditions or treatments  Talk to your doctor, nurse or pharmacist before following any medical regimen to see if it is safe and effective for you  High Fiber Diet   WHAT YOU NEED TO KNOW:   What is a high-fiber diet? A high-fiber diet includes foods that have a high amount of fiber  Fiber is the part of fruits, vegetables, and grains that is not broken down by your body  Fiber keeps your bowel movements regular  Fiber can also help lower your cholesterol level, control blood sugar in people with diabetes, and relieve constipation  Fiber can also help you control your weight because it helps you feel full faster  Most adults should eat 25 to 35 grams of fiber each day  Talk to your dietitian or healthcare provider about the amount of fiber you need  What foods are good sources of fiber? · Foods with at least 4 grams of fiber per serving:      ? ? to ½ cup of high-fiber cereal (check the nutrition label on the box)    ? ½ cup of blackberries or raspberries    ? 4 dried prunes    ? 1 cooked artichoke    ? ½ cup of cooked legumes, such as lentils, or red, kidney, and alonzo beans    · Foods with 1 to 3 grams of fiber per serving:      ? 1 slice of whole-wheat, pumpernickel, or rye bread    ? ½ cup of cooked brown rice    ? 4 whole-wheat crackers    ? 1 cup of oatmeal    ? ½ cup of cereal with 1 to 3 grams of fiber per serving (check the nutrition label on the box)    ? 1 small piece of fruit, such as an apple, banana, pear, kiwi, or orange    ? 3 dates    ? ½ cup of canned apricots, fruit cocktail, peaches, or pears    ? ½ cup of raw or cooked vegetables, such as carrots, cauliflower, cabbage, spinach, squash, or corn  What are some ways that I can increase fiber in my diet? · Choose brown or wild rice instead of white rice  · Use whole wheat flour in recipes instead of white or all-purpose flour  · Add beans and peas to casseroles or soups  · Choose fresh fruit and vegetables with peels or skins on instead of juices  What other guidelines should I follow? · Add fiber to your diet slowly  You may have abdominal discomfort, bloating, and gas if you add fiber to your diet too quickly  · Drink plenty of liquids as you add fiber to your diet  You may have nausea or develop constipation if you do not drink enough water  Ask how much liquid to drink each day and which liquids are best for you  CARE AGREEMENT:   You have the right to help plan your care  Discuss treatment options with your healthcare provider to decide what care you want to receive  You always have the right to refuse treatment  The above information is an  only  It is not intended as medical advice for individual conditions or treatments   Talk to your doctor, nurse or pharmacist before following any medical regimen to see if it is safe and effective for you  © Copyright [address] Information is for End User's use only and may not be sold, redistributed or otherwise used for commercial purposes  All illustrations and images included in CareNotes® are the copyrighted property of A D A M , Inc  or Yessica Sheldon   Hemorrhoids   WHAT YOU NEED TO KNOW:   What are hemorrhoids? Hemorrhoids are swollen blood vessels inside your rectum (internal hemorrhoids) or on your anus (external hemorrhoids)  Sometimes a hemorrhoid may prolapse  This means it extends out of your anus  What increases my risk for hemorrhoids? · Pregnancy or obesity    · Straining or sitting for a long time during bowel movements    · Liver disease    · Weak muscles around the anus caused by older age, rectal surgery, or anal intercourse    · A lack of physical activity    · Chronic diarrhea or constipation    · A low-fiber diet    What are the signs and symptoms of hemorrhoids? · Pain or itching around your anus or inside your rectum    · Swelling or bumps around your anus    · Bright red blood in your bowel movement, on the toilet paper, or in the toilet bowl    · Tissue bulging out of your anus (prolapsed hemorrhoids)    · Incontinence (poor control over urine or bowel movements)    How are hemorrhoids diagnosed? Your healthcare provider will ask about your symptoms, the foods you eat, and your bowel movements  He or she will examine your anus for external hemorrhoids  You may need the following:  · A digital rectal exam  is a test to check for hemorrhoids  Your healthcare provider will put a gloved finger inside your anus to feel for the hemorrhoids  · An anoscopy  is a test that uses a scope (small tube with a light and camera on the end) to look at your hemorrhoids  How are hemorrhoids treated? Treatment will depend on your symptoms   You may need any of the following:  · Medicines  can help decrease pain and swelling, and soften your bowel movement  The medicine may be a pill, pad, cream, or ointment  · Procedures  may be used to shrink or remove your hemorrhoid  Examples include rubber-band ligation, sclerotherapy, and photocoagulation  These procedures may be done in your healthcare provider's office  Ask your healthcare provider for more information about these procedures  · Surgery  may be needed to shrink or remove your hemorrhoids  How can I manage my symptoms? · Apply ice on your anus for 15 to 20 minutes every hour or as directed  Use an ice pack, or put crushed ice in a plastic bag  Cover it with a towel before you apply it to your anus  Ice helps prevent tissue damage and decreases swelling and pain  · Take a sitz bath  Fill a bathtub with 4 to 6 inches of warm water  You may also use a sitz bath pan that fits inside a toilet bowl  Sit in the sitz bath for 15 minutes  Do this 3 times a day, and after each bowel movement  The warm water can help decrease pain and swelling  · Keep your anal area clean  Gently wash the area with warm water daily  Soap may irritate the area  After a bowel movement, wipe with moist towelettes or wet toilet paper  Dry toilet paper can irritate the area  How can I help prevent hemorrhoids? · Do not strain to have a bowel movement  Do not sit on the toilet too long  These actions can increase pressure on the tissues in your rectum and anus  · Drink plenty of liquids  Liquids can help prevent constipation  Ask how much liquid to drink each day and which liquids are best for you  · Eat a variety of high-fiber foods  Examples include fruits, vegetables, and whole grains  Ask your healthcare provider how much fiber you need each day  You may need to take a fiber supplement  · Exercise as directed  Exercise, such as walking, may make it easier to have a bowel movement  Ask your healthcare provider to help you create an exercise plan  · Do not have anal sex  Anal sex can weaken the skin around your rectum and anus  · Avoid heavy lifting  This can cause straining and increase your risk for another hemorrhoid  When should I seek immediate care? · You have severe pain in your rectum or around your anus  · You have severe pain in your abdomen and you are vomiting  · You have bleeding from your anus that soaks through your underwear  When should I contact my healthcare provider? · You have frequent and painful bowel movements  · Your hemorrhoid looks or feels more swollen than usual      · You do not have a bowel movement for 2 days or more  · You see or feel tissue coming through your anus  · You have questions or concerns about your condition or care  CARE AGREEMENT:   You have the right to help plan your care  Learn about your health condition and how it may be treated  Discuss treatment options with your healthcare providers to decide what care you want to receive  You always have the right to refuse treatment  The above information is an  only  It is not intended as medical advice for individual conditions or treatments  Talk to your doctor, nurse or pharmacist before following any medical regimen to see if it is safe and effective for you  © Copyright [address] Information is for End User's use only and may not be sold, redistributed or otherwise used for commercial purposes  All illustrations and images included in CareNotes® are the copyrighted property of A D A M , Inc  or [address] [place] [address]  Colorectal Polyps   WHAT YOU NEED TO KNOW:   Colorectal polyps are small growths of tissue in the lining of the colon and rectum  Most polyps are hyperplastic polyps and are usually benign (noncancerous)  Certain types of polyps, called adenomatous polyps, may turn into cancer  DISCHARGE INSTRUCTIONS:   Follow up with your healthcare provider or gastroenterologist as directed:   You may need to return for more tests, such as another colonoscopy  Write down your questions so you remember to ask them during your visits  Reduce your risk for colorectal polyps:   · Eat a variety of healthy foods:  Healthy foods include fruit, vegetables, whole-grain breads, low-fat dairy products, beans, lean meat, and fish  Ask if you need to be on a special diet  · Maintain a healthy weight:  Ask your healthcare provider if you need to lose weight and how much you need to lose  Ask for help with a weight loss program     · Exercise:  Begin to exercise slowly and do more as you get stronger  Talk with your healthcare provider before you start an exercise program      · Limit alcohol:  Your risk for polyps increases the more you drink  · Do not smoke: If you smoke, it is never too late to quit  Ask for information about how to stop  For support and more information:   · [facility] ([facility])  [address] [id number]  Phone: 6- 137 - 118-1960  Web Address: www digestive  niddk nih gov    Contact your healthcare provider or gastroenterologist if:   · You have a fever  · You have chills, a cough, or feel weak and achy  · You have abdominal pain that does not go away or gets worse after you take medicine  · Your abdomen is swollen  · You are losing weight without trying  · You have questions or concerns about your condition or care  Seek care immediately or call 911 if:   · You have sudden shortness of breath  · You have a fast heart rate, fast breathing, or are too dizzy to stand up  · You have severe abdominal pain  · You see blood in your bowel movement  © Copyright [address] Information is for End User's use only and may not be sold, redistributed or otherwise used for commercial purposes   All illustrations and images included in CareNotes® are the copyrighted property of A D A M , Inc  or [facility]  The above information is an  only  It is not intended as medical advice for individual conditions or treatments  Talk to your doctor, nurse or pharmacist before following any medical regimen to see if it is safe and effective for you

## 2021-06-11 NOTE — H&P
History and Physical - [facility]  Clari Green 70 y o  male MRN: [id number]                  HPI: Clari Green is a 70y o  year old male who presents for history of polyps      REVIEW OF SYSTEMS: Per the HPI, and otherwise unremarkable      Historical Information   Past Medical History:   Diagnosis Date    Arthritis     Asthma     seasonal allergy triggered    Colon polyp     Diverticulosis     Erectile dysfunction     [place] (hard of hearing)     Hyperlipidemia     Hypertension     Irregular heart beat     hx a flutter had an ablation in past, has loop recorder    Perceptive hearing loss, both sides     waers hearing aides bilateral    Seasonal allergies     Seasonal allergies     Spinal stenosis     lumbar     Past Surgical History:   Procedure Laterality Date    CARDIAC ELECTROPHYSIOLOGY MAPPING AND ABLATION      CARDIAC ELECTROPHYSIOLOGY STUDY AND ABLATION      CARDIAC LOOP RECORDER Bilateral     COLONOSCOPY      ORCHIECTOMY Left 2006    VASECTOMY       Social History   Social History     Substance and Sexual Activity   Alcohol Use Yes    Frequency: 4 or more times a week    Drinks per session: 1 or 2    Comment: beer daily     Social History     Substance and Sexual Activity   Drug Use No     Social History     Tobacco Use   Smoking Status Former Smoker   Smokeless Tobacco Never Used   Tobacco Comment    quit in 19's     Family History   Problem Relation Age of Onset    Lung cancer Mother     Colon cancer Mother        Meds/Allergies       Current Outpatient Medications:     aspirin 81 MG tablet    fluticasone (FLONASE) 50 mcg/act nasal spray    metoprolol succinate (TOPROL-XL) 100 mg 24 hr tablet    pravastatin (PRAVACHOL) 40 mg tablet    testosterone cypionate (DEPO-TESTOSTERONE) 200 mg/mL SOLN    vitamin E, tocopherol, 400 units capsule    albuterol (PROVENTIL HFA,VENTOLIN HFA) 90 mcg/act inhaler    BREO ELLIPTA 100-25 MCG/INH inhaler    diclofenac sodium (VOLTAREN) 1 %    loratadine (CLARITIN) 10 mg tablet    sildenafil (VIAGRA) 100 mg tablet    Current Facility-Administered Medications:     sodium chloride 0 9 % infusion, 30 mL/hr, Intravenous, Continuous    sodium chloride 0 9 % infusion, 20 mL/hr, Intravenous, Continuous    Facility-Administered Medications Ordered in Other Encounters:     sodium chloride 0 9 % infusion, , , Continuous PRN, New Bag at 06/11/21 1401    Allergies   Allergen Reactions    Atorvastatin Other (See Comments)     Urinary retention       Objective     /85   Pulse 70   Temp (!) 97 2 °F (36 2 °C) (Temporal)   Resp 18   Ht 5' 8" (1 727 m)   Wt 83 9 kg (185 lb)   SpO2 97%   BMI 28 13 kg/m²       PHYSICAL EXAM    Gen: NAD  Head: NCAT  CV: RRR  CHEST: Clear  ABD: soft, NT/ND  EXT: no edema      ASSESSMENT/PLAN:  This is a 70y o  year old male here for colonoscopy, and he is stable and optimized for his procedure

## 2021-06-11 NOTE — ANESTHESIA POSTPROCEDURE EVALUATION
Post-Op Assessment Note    CV Status:  Stable  Pain Score: 0    Pain management: adequate     Mental Status:  Alert and awake   Hydration Status:  Euvolemic   PONV Controlled:  Controlled   Airway Patency:  Patent      Post Op Vitals Reviewed: Yes      Staff: CRNA   Comments: vss        No complications documented      /67 (06/11/21 1500)    Temp     Pulse 83 (06/11/21 1500)   Resp 18 (06/11/21 1500)    SpO2 96 % (06/11/21 1500)

## 2021-06-19 DIAGNOSIS — I48.3 TYPICAL ATRIAL FLUTTER (HCC): ICD-10-CM

## 2021-06-21 RX ORDER — METOPROLOL SUCCINATE 100 MG/1
TABLET, EXTENDED RELEASE ORAL
Qty: 90 TABLET | Refills: 0 | Status: SHIPPED | OUTPATIENT
Start: 2021-06-21 | End: 2021-09-16

## 2021-07-22 ENCOUNTER — OFFICE VISIT (OUTPATIENT)
Dept: UROLOGY | Facility: CLINIC | Age: 71
End: 2021-07-22
Payer: MEDICARE

## 2021-07-22 ENCOUNTER — TELEPHONE (OUTPATIENT)
Dept: UROLOGY | Facility: CLINIC | Age: 71
End: 2021-07-22

## 2021-07-22 VITALS
BODY MASS INDEX: 27.43 KG/M2 | HEIGHT: 68 IN | WEIGHT: 181 LBS | SYSTOLIC BLOOD PRESSURE: 148 MMHG | DIASTOLIC BLOOD PRESSURE: 80 MMHG

## 2021-07-22 DIAGNOSIS — E29.1 MALE HYPOGONADISM: Primary | ICD-10-CM

## 2021-07-22 PROCEDURE — 99212 OFFICE O/P EST SF 10 MIN: CPT | Performed by: PHYSICIAN ASSISTANT

## 2021-07-22 PROCEDURE — 96372 THER/PROPH/DIAG INJ SC/IM: CPT

## 2021-07-22 NOTE — PROGRESS NOTES
Assessment:    1  Male hypogonadism      Plan:     -Aveed injections every 10 weeks  - labs every 6 months     Diagnoses and all orders for this visit:    Male hypogonadism  -     testosterone undecanoate (AVEED) intramuscular injection SOLN 750 mg  -     Testosterone, free, total; Future  -     CBC and Platelet; Future          Subjective:     Patient ID: Stuart Palacios is a 70 y o  male  With hypogonadism  He gets testosterone replacement injections every 10 weeks  No reported adverse reaction  He had an orchiectomy 8-9 years ago  HPI    Review of Systems        INJECTION: After obtaining consent, and per ordersinjection of Aveed 750mg  given  In the Left GM by Indigo Mello PA-C  Patient instructed  report any adverse reaction to me immediately       Physical Exam

## 2021-08-23 ENCOUNTER — REMOTE DEVICE CLINIC VISIT (OUTPATIENT)
Dept: CARDIOLOGY CLINIC | Facility: CLINIC | Age: 71
End: 2021-08-23
Payer: MEDICARE

## 2021-08-23 DIAGNOSIS — Z95.818 PRESENCE OF OTHER CARDIAC IMPLANTS AND GRAFTS: Primary | ICD-10-CM

## 2021-08-23 PROCEDURE — 93298 REM INTERROG DEV EVAL SCRMS: CPT | Performed by: INTERNAL MEDICINE

## 2021-08-23 PROCEDURE — G2066 INTER DEVC REMOTE 30D: HCPCS | Performed by: INTERNAL MEDICINE

## 2021-08-23 NOTE — PROGRESS NOTES
MDT ILR/ ACTIVE SYSTEM IS MRI CONDITIONAL   CARELINK TRANSMISSION: LOOP RECORDER  PRESENTING RHYTHM NSR W/ PVCs @ 78 BPM  BATTERY STATUS "OK " 6 TACHY EPISODES W/ EGRAMS SHOWING OVERSENSING  45 AF EPISODES W/ EGRAMS SUGGESTING SR W/ PACs, PVCs [ BIGEMINAL AT TIMES] UNSDERSENSING AND OVERSENSING  AF BURDEN = 0 2%  PT TAKES METOPROLOL SUCC AND ASA 81  NO PATIENT ACTIVATED EPISODES  NORMAL DEVICE FUNCTION   DL

## 2021-08-25 ENCOUNTER — OFFICE VISIT (OUTPATIENT)
Dept: CARDIOLOGY CLINIC | Facility: CLINIC | Age: 71
End: 2021-08-25
Payer: MEDICARE

## 2021-08-25 VITALS
DIASTOLIC BLOOD PRESSURE: 70 MMHG | SYSTOLIC BLOOD PRESSURE: 102 MMHG | HEART RATE: 62 BPM | HEIGHT: 68 IN | WEIGHT: 180.2 LBS | BODY MASS INDEX: 27.31 KG/M2

## 2021-08-25 DIAGNOSIS — R00.2 PALPITATIONS: Primary | ICD-10-CM

## 2021-08-25 DIAGNOSIS — I48.3 TYPICAL ATRIAL FLUTTER (HCC): ICD-10-CM

## 2021-08-25 PROCEDURE — 99214 OFFICE O/P EST MOD 30 MIN: CPT | Performed by: INTERNAL MEDICINE

## 2021-08-25 PROCEDURE — 1123F ACP DISCUSS/DSCN MKR DOCD: CPT | Performed by: INTERNAL MEDICINE

## 2021-08-25 PROCEDURE — 93000 ELECTROCARDIOGRAM COMPLETE: CPT | Performed by: INTERNAL MEDICINE

## 2021-08-25 NOTE — PROGRESS NOTES
HEART AND VASCULAR  CARDIAC [address] [facility]    Outpatient f/u   Today's Date: 08/25/21        Patient name: Patrick Sanderson  YOB: 1950  Sex: male         Chief Complaint: f/u Atrial flutter    ASSESSMENT:  Problem List Items Addressed This Visit        Cardiovascular and Mediastinum    Typical atrial flutter [facility])      Other Visit Diagnoses     Palpitations    -  Primary    Relevant Orders    POCT ECG          69 yo male  1) S/p successful ablation for Typical aflutter 2018  He had aflutter  w RVR found incidentally in routine physical at PCP  HR 120s, was asymptomatic, actually able to walk about  4 miles recently  No edemt, no cp, no palpitations  EF remains low normal 53%  Mild RV dilation and LUBA  No valve disease  Started on TOprol XL 100mg and ELiquis  [id number] (Age and HTN)    Loop recorder showed 1hr 10min episode of afib July 2nd, no symptoms  I reviewed strips, some other false positive episodes on loop that are short, NSR w ectopy  This is only afib so far  2) HTN- well controlled  3) He does snore but no daytime fatigue or other symptoms of PRUDENCIO  Denies heavy ETOH use  PLAN:  1  Very low burden of afib, no symptoms, modestly low [id number], we discussed risks and benefits of anticoagulation but will continue to observe for now  2  Cont metoprolol, aspirin    F/u 1 year or sooner if more afib on loop  Orders Placed This Encounter   Procedures    POCT ECG     There are no discontinued medications    HPI/Subjective:     69 yo male  1) S/p successful ablation for Typical aflutter 2018  He had aflutter  w RVR found incidentally in routine physical at PCP  HR 120s, was asymptomatic, actually able to walk about  4 miles recently  No edemt, no cp, no palpitations  EF remains low normal 53%  Mild RV dilation and LUBA   No valve disease  Started on TOprol XL 100mg and ELiquis  [id number] (Age and HTN)    Loop recorder showed 1hr 10min episode of afib July 2nd, no symptoms  I reviewed strips, some other false positive episodes on loop that are short, NSR w ectopy  This is only afib so far  2) HTN- well controlled  3) He does snore but no daytime fatigue or other symptoms of PRUDENCIO  Denies heavy ETOH use  Please note HPI is listed by problem with with update following it, it is copied again in the assessment above and reflects medical decision making as well  Complete 12 point ROS reviewed and otherwise non pertinent or negative except as per HPI pertinent positives in Cardiovascular and Respiratory emphasized  Please see paper chart for outpatient clinic patients where the patient completed the 12 point ROS survey  Past Medical History:   Diagnosis Date    Arthritis     Asthma     seasonal allergy triggered    Colon polyp     Diverticulosis     Erectile dysfunction     [place] (hard of hearing)     Hyperlipidemia     Hypertension     Irregular heart beat     hx a flutter had an ablation in past, has loop recorder    Perceptive hearing loss, both sides     waers hearing aides bilateral    Seasonal allergies     Seasonal allergies     Spinal stenosis     lumbar       Allergies   Allergen Reactions    Atorvastatin Other (See Comments)     Urinary retention     I reviewed the Home Medication list and Allergies in the chart     Scheduled Meds:  Current Outpatient Medications   Medication Sig Dispense Refill    aspirin 81 MG tablet Take 81 mg by mouth daily      fluticasone (FLONASE) 50 mcg/act nasal spray SPRAY 2 SPRAYS INTO EACH NOSTRIL EVERY DAY      metoprolol succinate (TOPROL-XL) 100 mg 24 hr tablet TAKE ONE TABLET BY MOUTH EVERY DAY 90 tablet 0    pravastatin (PRAVACHOL) 40 mg tablet TAKE 1 TABLET BY MOUTH EVERY DAY AT NIGHT      sildenafil (VIAGRA) 100 mg tablet Take 1 tablet (100 mg total) by mouth daily as needed for erectile dysfunction 10 tablet 3    testosterone cypionate (DEPO-TESTOSTERONE) 200 mg/mL SOLN INJECT 1 5 ML EVERY 28 DAYS  5    vitamin E, tocopherol, 400 units capsule Take 400 Units by mouth daily      albuterol (PROVENTIL HFA,VENTOLIN HFA) 90 mcg/act inhaler Inhale 2 puffs every 6 (six) hours as needed (Patient not taking: Reported on 8/25/2021)      loratadine (CLARITIN) 10 mg tablet Take 10 mg by mouth as needed  (Patient not taking: Reported on 8/25/2021)       No current facility-administered medications for this visit  PRN Meds:         Family History   Problem Relation Age of Onset    Lung cancer Mother     Colon cancer Mother        Social History     Socioeconomic History    Marital status: /Civil Union     Spouse name: Not on file    Number of children: Not on file    Years of education: Not on file    Highest education level: Not on file   Occupational History    Not on file   Tobacco Use    Smoking status: Former Smoker    Smokeless tobacco: Never Used    Tobacco comment: quit in 20's   Vaping Use    Vaping Use: Never used   Substance and Sexual Activity    Alcohol use: Yes     Comment: beer daily    Drug use: No    Sexual activity: Not on file   Other Topics Concern    Not on file   Social History Narrative    Not on file     Social Determinants of Health     Financial Resource Strain:     Difficulty of Paying Living Expenses:    Food Insecurity:     Worried About [address] in the Last Year:     [address] in the Last Year:    Transportation Needs:     Lack of Transportation (Medical):      Lack of Transportation (Non-Medical):    Physical Activity:     Days of Exercise per Week:     Minutes of Exercise per Session:    Stress:     Feeling of Stress :    Social Connections:     Frequency of Communication with Friends and Family:     Frequency of Social Gatherings with Friends and Family:     Attends Voodoo Services:  Active Member of Clubs or Organizations:     Attends Club or Organization Meetings:     Marital Status:    Intimate Partner Violence:     Fear of Current or Ex-Partner:     Emotionally Abused:     Physically Abused:     Sexually Abused:          OBJECTIVE:    /70 (BP Location: Left arm, Patient Position: Sitting, Cuff Size: Standard)   Pulse 62   Ht 5' 8" (1 727 m)   Wt 81 7 kg (180 lb 3 2 oz)   BMI 27 40 kg/m²   Vitals:    08/25/21 1029   Weight: 81 7 kg (180 lb 3 2 oz)     /70 (BP Location: Left arm, Patient Position: Sitting, Cuff Size: Standard)   Pulse 62   Ht 5' 8" (1 727 m)   Wt 81 7 kg (180 lb 3 2 oz)   BMI 27 40 kg/m²   Vitals:    08/25/21 1029   Weight: 81 7 kg (180 lb 3 2 oz)     GEN: No acute distress, Alert and oriented, well appearing  HEENT:External ears normal, wearing a mask  EYES: Pupils equal, sclera anicteric, midline, normal conjuctiva  NECK: No JVD, supple, no obvious masses or thryomegaly or goiter  CARDIOVASCULAR:  RRR, No murmur, rub, gallops S1,S2  LUNGS: Clear To auscultation bilaterally, normal effort, no rales, rhonchi, crackles   ABDOMEN:  nondistended,  without obvious organomegaly or ascites  EXTREMITIES/VASCULAR:  No edema  warm an well perfused  PSYCH: Normal Affect,  linear speech pattern without evidence of psychosis  NEURO: Grossly intact, moving all extremiteis equal, face symmetric, alert and responsive, no obvious focal defecits   GAIT:  Ambulates normally without difficulty  HEME: No bleeding, bruising, petechia, purpura   SKIN: No significant rashes on visibile skin, warm, no diaphoresis or pallor         Lab Results:       LABS:      Chemistry        Component Value Date/Time    K 3 8 01/15/2021 0701     01/15/2021 0701    CO2 27 01/15/2021 0701    BUN 18 01/15/2021 0701    CREATININE 1 00 01/15/2021 0701        Component Value Date/Time    CALCIUM 8 6 01/15/2021 0701    ALKPHOS 68 01/15/2021 0701    AST 17 01/15/2021 0701    ALT 43 01/15/2021 0701            No results found for: CHOL  Lab Results   Component Value Date    HDL 39 (L) 2019    HDL 39 (L) 2019    HDL 41 2019     Lab Results   Component Value Date    LDLCALC 57 2019    LDLCALC 114 (H) 2019    LDLCALC 115 (H) 2019     Lab Results   Component Value Date    TRIG 154 (H) 2019    TRIG 208 (H) 2019    TRIG 145 2019     No results found for: CHOLHDL    IMAGING: Xr Chest 1 View Portable    Result Date: 10/18/2018  Narrative: CHEST INDICATION:   sob  COMPARISON:  None EXAM PERFORMED/VIEWS:  XR CHEST PORTABLE FINDINGS: Cardiomediastinal silhouette appears unremarkable  The lungs are clear  No pneumothorax or pleural effusion  Osseous structures appear within normal limits for patient age  Impression: No acute cardiopulmonary disease  Workstation performed: [id number]        Cardiac testing:   Results for orders placed during the hospital encounter of 10/17/18   Echo complete with contrast if indicated    Narrative [place] [address] [address] [place] [address]  [phone]    Transthoracic Echocardiogram  2D, M-mode, Doppler, and Color Doppler    Study date:  18-Oct-2018    Patient: Maureen Grijalva  MR number: [id number]  Account number: [de-identified]  : 1950  Age: 76 years  Gender: Male  Status: Outpatient  Location: Bedside  Height: 68 in  Weight: 179 5 lb  BP: 125/ 83 mmHg    Indications: Atrial Flutter    Diagnoses: I48 1 - Atrial flutter    Sonographer:  Inez Eduardo RDCS  Primary Physician:  Calin Jimenez MD  Referring Physician:  Kerry Vo PA-C  Group:  [facility]  Interpreting Physician:  Wilbert Mcknight MD    SUMMARY    LEFT VENTRICLE:  Systolic function was at the lower limits of normal  Ejection fraction was estimated to be 53 %  There were no regional wall motion abnormalities  RIGHT VENTRICLE:  The ventricle was mildly dilated    Systolic function was normal     RIGHT ATRIUM:  The atrium was mildly dilated  HISTORY: PRIOR HISTORY: hypertension    PROCEDURE: The procedure was performed at the bedside  This was a routine study  The transthoracic approach was used  The study included complete 2D imaging, M-mode, complete spectral Doppler, and color Doppler  The heart rate was 116 bpm,  at the start of the study  Images were obtained from the parasternal, apical, subcostal, and suprasternal notch acoustic windows  Image quality was adequate  LEFT VENTRICLE: Size was normal  Systolic function was at the lower limits of normal  Ejection fraction was estimated to be 53 %  There were no regional wall motion abnormalities  Wall thickness was normal  DOPPLER: Transmitral flow  pattern: atrial fibrillation  RIGHT VENTRICLE: The ventricle was mildly dilated  Systolic function was normal  Wall thickness was normal     LEFT ATRIUM: Size was normal     RIGHT ATRIUM: The atrium was mildly dilated  MITRAL VALVE: Valve structure was normal  There was normal leaflet separation  DOPPLER: The transmitral velocity was within the normal range  There was no evidence for stenosis  There was no significant regurgitation  AORTIC VALVE: The valve was trileaflet  Leaflets exhibited normal thickness and normal cuspal separation  DOPPLER: Transaortic velocity was within the normal range  There was no evidence for stenosis  There was no significant  regurgitation  TRICUSPID VALVE: The valve structure was normal  There was normal leaflet separation  DOPPLER: The transtricuspid velocity was within the normal range  There was no evidence for stenosis  There was no significant regurgitation  PULMONIC VALVE: Leaflets exhibited normal thickness, no calcification, and normal cuspal separation  DOPPLER: The transpulmonic velocity was within the normal range  There was no significant regurgitation  PERICARDIUM: There was no pericardial effusion   The pericardium was normal in appearance  AORTA: The root exhibited normal size  SYSTEMIC VEINS: IVC: The inferior vena cava was normal in size  SYSTEM MEASUREMENT TABLES    2D  %FS: 28 57 %  AV Diam: 3 14 cm  EDV(Teich): 82 2 ml  EF(Teich): 55 38 %  ESV(Teich): 36 68 ml  IVSd: 0 89 cm  LA Area: 17 04 cm2  LA Diam: 3 83 cm  LVEDV MOD A4C: 72 9 ml  LVEF MOD A4C: 62 23 %  LVESV MOD A4C: 27 53 ml  LVIDd: 4 28 cm  LVIDs: 3 06 cm  LVLd A4C: 7 12 cm  LVLs A4C: 5 71 cm  LVPWd: 0 9 cm  RA Area: 21 54 cm2  RVIDd: 4 02 cm  SV MOD A4C: 45 37 ml  SV(Teich): 45 53 ml    CW  TR MaxP 56 mmHg  TR Vmax: 2 15 m/s    MM  TAPSE: 2 08 cm    [facility] Accredited Echocardiography Laboratory    Prepared and electronically signed by    Ubaldo Prabhakar MD  Signed 18-Oct-2018 15:40:38       No results found for this or any previous visit  No results found for this or any previous visit  No results found for this or any previous visit          I reviewed and interpreted the following LABS/EKG/TELE/IMAGING and below is summary of my interpretation (if data available):    LABS:normal renal function    Current EKG and Rhythm Strip:NSR, RBBB  Past EKGs and RHYTHM strip: Flutter w rvr 10/17/18 and rbbb

## 2021-09-16 DIAGNOSIS — I48.3 TYPICAL ATRIAL FLUTTER (HCC): ICD-10-CM

## 2021-09-16 RX ORDER — METOPROLOL SUCCINATE 100 MG/1
TABLET, EXTENDED RELEASE ORAL
Qty: 90 TABLET | Refills: 0 | Status: SHIPPED | OUTPATIENT
Start: 2021-09-16 | End: 2021-12-13

## 2021-10-01 ENCOUNTER — APPOINTMENT (OUTPATIENT)
Dept: LAB | Facility: CLINIC | Age: 71
End: 2021-10-01
Payer: MEDICARE

## 2021-10-01 DIAGNOSIS — E29.1 MALE HYPOGONADISM: ICD-10-CM

## 2021-10-01 LAB
ERYTHROCYTE [DISTWIDTH] IN BLOOD BY AUTOMATED COUNT: 11.8 % (ref 11.6–15.1)
HCT VFR BLD AUTO: 51.7 % (ref 36.5–49.3)
HGB BLD-MCNC: 17.3 G/DL (ref 12–17)
MCH RBC QN AUTO: 30.8 PG (ref 26.8–34.3)
MCHC RBC AUTO-ENTMCNC: 33.5 G/DL (ref 31.4–37.4)
MCV RBC AUTO: 92 FL (ref 82–98)
PLATELET # BLD AUTO: 187 THOUSANDS/UL (ref 149–390)
PMV BLD AUTO: 9.5 FL (ref 8.9–12.7)
RBC # BLD AUTO: 5.61 MILLION/UL (ref 3.88–5.62)
WBC # BLD AUTO: 6.09 THOUSAND/UL (ref 4.31–10.16)

## 2021-10-01 PROCEDURE — 84402 ASSAY OF FREE TESTOSTERONE: CPT

## 2021-10-01 PROCEDURE — 84403 ASSAY OF TOTAL TESTOSTERONE: CPT

## 2021-10-01 PROCEDURE — 85027 COMPLETE CBC AUTOMATED: CPT

## 2021-10-01 PROCEDURE — 36415 COLL VENOUS BLD VENIPUNCTURE: CPT

## 2021-10-02 LAB
TESTOST FREE SERPL-MCNC: 9.9 PG/ML (ref 6.6–18.1)
TESTOST SERPL-MCNC: 576 NG/DL (ref 264–916)

## 2021-10-05 ENCOUNTER — TELEPHONE (OUTPATIENT)
Dept: UROLOGY | Facility: CLINIC | Age: 71
End: 2021-10-05

## 2021-10-05 ENCOUNTER — OFFICE VISIT (OUTPATIENT)
Dept: UROLOGY | Facility: CLINIC | Age: 71
End: 2021-10-05
Payer: MEDICARE

## 2021-10-05 VITALS
WEIGHT: 182 LBS | HEIGHT: 68 IN | SYSTOLIC BLOOD PRESSURE: 118 MMHG | BODY MASS INDEX: 27.58 KG/M2 | DIASTOLIC BLOOD PRESSURE: 80 MMHG

## 2021-10-05 DIAGNOSIS — E29.1 MALE HYPOGONADISM: Primary | ICD-10-CM

## 2021-10-05 PROCEDURE — 96372 THER/PROPH/DIAG INJ SC/IM: CPT | Performed by: PHYSICIAN ASSISTANT

## 2021-10-05 PROCEDURE — 99213 OFFICE O/P EST LOW 20 MIN: CPT | Performed by: PHYSICIAN ASSISTANT

## 2021-11-22 ENCOUNTER — REMOTE DEVICE CLINIC VISIT (OUTPATIENT)
Dept: CARDIOLOGY CLINIC | Facility: CLINIC | Age: 71
End: 2021-11-22
Payer: MEDICARE

## 2021-11-22 DIAGNOSIS — Z95.818 PRESENCE OF OTHER CARDIAC IMPLANTS AND GRAFTS: Primary | ICD-10-CM

## 2021-11-22 PROCEDURE — 93298 REM INTERROG DEV EVAL SCRMS: CPT | Performed by: INTERNAL MEDICINE

## 2021-11-22 PROCEDURE — G2066 INTER DEVC REMOTE 30D: HCPCS | Performed by: INTERNAL MEDICINE

## 2021-12-08 ENCOUNTER — IMMUNIZATIONS (OUTPATIENT)
Dept: FAMILY MEDICINE CLINIC | Facility: HOSPITAL | Age: 71
End: 2021-12-08

## 2021-12-08 DIAGNOSIS — Z23 ENCOUNTER FOR IMMUNIZATION: Primary | ICD-10-CM

## 2021-12-08 PROCEDURE — 0064A COVID-19 MODERNA VACC 0.25 ML BOOSTER: CPT

## 2021-12-08 PROCEDURE — 91306 COVID-19 MODERNA VACC 0.25 ML BOOSTER: CPT

## 2021-12-11 DIAGNOSIS — I48.3 TYPICAL ATRIAL FLUTTER (HCC): ICD-10-CM

## 2021-12-13 RX ORDER — METOPROLOL SUCCINATE 100 MG/1
TABLET, EXTENDED RELEASE ORAL
Qty: 90 TABLET | Refills: 0 | Status: SHIPPED | OUTPATIENT
Start: 2021-12-13 | End: 2022-03-14

## 2021-12-14 ENCOUNTER — APPOINTMENT (OUTPATIENT)
Dept: LAB | Facility: CLINIC | Age: 71
End: 2021-12-14
Payer: MEDICARE

## 2021-12-14 DIAGNOSIS — E29.1 MALE HYPOGONADISM: ICD-10-CM

## 2021-12-14 LAB
ERYTHROCYTE [DISTWIDTH] IN BLOOD BY AUTOMATED COUNT: 11.9 % (ref 11.6–15.1)
HCT VFR BLD AUTO: 53.2 % (ref 36.5–49.3)
HGB BLD-MCNC: 17.8 G/DL (ref 12–17)
MCH RBC QN AUTO: 30.6 PG (ref 26.8–34.3)
MCHC RBC AUTO-ENTMCNC: 33.5 G/DL (ref 31.4–37.4)
MCV RBC AUTO: 92 FL (ref 82–98)
PLATELET # BLD AUTO: 187 THOUSANDS/UL (ref 149–390)
PMV BLD AUTO: 9.3 FL (ref 8.9–12.7)
RBC # BLD AUTO: 5.81 MILLION/UL (ref 3.88–5.62)
WBC # BLD AUTO: 6.47 THOUSAND/UL (ref 4.31–10.16)

## 2021-12-14 PROCEDURE — 36415 COLL VENOUS BLD VENIPUNCTURE: CPT

## 2021-12-14 PROCEDURE — 85027 COMPLETE CBC AUTOMATED: CPT

## 2021-12-17 ENCOUNTER — OFFICE VISIT (OUTPATIENT)
Dept: UROLOGY | Facility: CLINIC | Age: 71
End: 2021-12-17
Payer: MEDICARE

## 2021-12-17 VITALS
DIASTOLIC BLOOD PRESSURE: 80 MMHG | HEIGHT: 68 IN | SYSTOLIC BLOOD PRESSURE: 142 MMHG | WEIGHT: 186 LBS | HEART RATE: 76 BPM | BODY MASS INDEX: 28.19 KG/M2

## 2021-12-17 DIAGNOSIS — E29.1 MALE HYPOGONADISM: Primary | ICD-10-CM

## 2021-12-17 DIAGNOSIS — N40.0 BENIGN PROSTATIC HYPERPLASIA WITHOUT LOWER URINARY TRACT SYMPTOMS: ICD-10-CM

## 2021-12-17 PROBLEM — D75.1 POLYCYTHEMIA: Status: ACTIVE | Noted: 2021-12-17

## 2021-12-17 PROCEDURE — 99213 OFFICE O/P EST LOW 20 MIN: CPT | Performed by: PHYSICIAN ASSISTANT

## 2021-12-29 ENCOUNTER — OFFICE VISIT (OUTPATIENT)
Dept: OBGYN CLINIC | Facility: CLINIC | Age: 71
End: 2021-12-29
Payer: MEDICARE

## 2021-12-29 VITALS
SYSTOLIC BLOOD PRESSURE: 127 MMHG | HEART RATE: 78 BPM | BODY MASS INDEX: 27.58 KG/M2 | WEIGHT: 182 LBS | DIASTOLIC BLOOD PRESSURE: 80 MMHG | RESPIRATION RATE: 18 BRPM | HEIGHT: 68 IN

## 2021-12-29 DIAGNOSIS — M18.12 ARTHRITIS OF CARPOMETACARPAL (CMC) JOINT OF LEFT THUMB: ICD-10-CM

## 2021-12-29 DIAGNOSIS — M19.041 DEGENERATIVE ARTHRITIS OF METACARPOPHALANGEAL JOINT OF MIDDLE FINGER OF RIGHT HAND: Primary | ICD-10-CM

## 2021-12-29 PROCEDURE — 99214 OFFICE O/P EST MOD 30 MIN: CPT | Performed by: SURGERY

## 2021-12-29 PROCEDURE — 20600 DRAIN/INJ JOINT/BURSA W/O US: CPT | Performed by: SURGERY

## 2021-12-29 RX ORDER — TRIAMCINOLONE ACETONIDE 40 MG/ML
20 INJECTION, SUSPENSION INTRA-ARTICULAR; INTRAMUSCULAR
Status: COMPLETED | OUTPATIENT
Start: 2021-12-29 | End: 2021-12-29

## 2021-12-29 RX ORDER — LIDOCAINE HYDROCHLORIDE 10 MG/ML
1 INJECTION, SOLUTION INFILTRATION; PERINEURAL
Status: COMPLETED | OUTPATIENT
Start: 2021-12-29 | End: 2021-12-29

## 2021-12-29 RX ADMIN — TRIAMCINOLONE ACETONIDE 20 MG: 40 INJECTION, SUSPENSION INTRA-ARTICULAR; INTRAMUSCULAR at 16:10

## 2021-12-29 RX ADMIN — LIDOCAINE HYDROCHLORIDE 1 ML: 10 INJECTION, SOLUTION INFILTRATION; PERINEURAL at 16:10

## 2022-02-15 ENCOUNTER — REMOTE DEVICE CLINIC VISIT (OUTPATIENT)
Dept: CARDIOLOGY CLINIC | Facility: CLINIC | Age: 72
End: 2022-02-15
Payer: MEDICARE

## 2022-02-15 DIAGNOSIS — Z95.818 PRESENCE OF OTHER CARDIAC IMPLANTS AND GRAFTS: Primary | ICD-10-CM

## 2022-02-15 PROCEDURE — G2066 INTER DEVC REMOTE 30D: HCPCS | Performed by: INTERNAL MEDICINE

## 2022-02-15 PROCEDURE — 93298 REM INTERROG DEV EVAL SCRMS: CPT | Performed by: INTERNAL MEDICINE

## 2022-02-15 NOTE — PROGRESS NOTES
MDT ILR/ ACTIVE SYSTEM IS MRI CONDITIONAL   CARELINK TRANSMISSION: LOOP RECORDER  PRESENTING RHYTHM NSR @ 62 BPM  BATTERY STATUS "OK " 3 TACHY EPISODES W/ EGRAMS SHOWING OVERSENSING  9 AF EPISODES W/ EGRAMS SUGGESTING SR W/ PACs , PVCs AND OVERSENSING  CAN NOT R/O AF DUE TO NOISE  AF BURDEN = 0%  NO PATIENT ACTIVATED EPISODES  NORMAL DEVICE FUNCTION   DL

## 2022-03-12 DIAGNOSIS — I48.3 TYPICAL ATRIAL FLUTTER (HCC): ICD-10-CM

## 2022-03-14 RX ORDER — METOPROLOL SUCCINATE 100 MG/1
TABLET, EXTENDED RELEASE ORAL
Qty: 90 TABLET | Refills: 0 | Status: SHIPPED | OUTPATIENT
Start: 2022-03-14 | End: 2022-06-13

## 2022-03-22 ENCOUNTER — OFFICE VISIT (OUTPATIENT)
Dept: CARDIOLOGY CLINIC | Facility: CLINIC | Age: 72
End: 2022-03-22
Payer: MEDICARE

## 2022-03-22 VITALS
WEIGHT: 180.6 LBS | DIASTOLIC BLOOD PRESSURE: 62 MMHG | HEIGHT: 68 IN | BODY MASS INDEX: 27.37 KG/M2 | OXYGEN SATURATION: 96 % | SYSTOLIC BLOOD PRESSURE: 132 MMHG | HEART RATE: 92 BPM

## 2022-03-22 DIAGNOSIS — I48.3 TYPICAL ATRIAL FLUTTER (HCC): Primary | ICD-10-CM

## 2022-03-22 PROCEDURE — 99213 OFFICE O/P EST LOW 20 MIN: CPT | Performed by: INTERNAL MEDICINE

## 2022-03-22 PROCEDURE — 93000 ELECTROCARDIOGRAM COMPLETE: CPT | Performed by: INTERNAL MEDICINE

## 2022-03-22 NOTE — PROGRESS NOTES
HEART AND VASCULAR  CARDIAC [address]  [address] [facility]    Outpatient f/u   Today's Date: 03/22/22        Patient name: Rosemary Heredia  YOB: 1950  Sex: male         Chief Complaint: f/u Atrial flutter    ASSESSMENT:  Problem List Items Addressed This Visit        Cardiovascular and Mediastinum    Typical atrial flutter [facility]) - Primary    Relevant Orders    POCT ECG          71 yo male  1) S/p successful ablation for Typical aflutter 2018  He had aflutter  w RVR found incidentally in routine physical at PCP  HR 120s, was asymptomatic, actually able to walk about  4 miles recently  No edemt, no cp, no palpitations  EF remains low normal 53%  Mild RV dilation and LUBA  No valve disease  Started on TOprol XL 100mg and ELiquis  [id number] (Age and HTN)    Loop recorder showed 1hr 10min episode of afib July 2nd 2021,  no symptoms  I reviewed strips, some other false positive episodes on loop that are short, NSR w ectopy  This is only afib so far  He is out of batteries on loop  2) HTN- well controlled  3) He does snore but no daytime fatigue or other symptoms of PRUDENCIO  Denies heavy ETOH use  PLAN:  1  Very low burden of afib, no symptoms, modestly low [id number], reasonable to continue aspiring for now but should replace the loop recorder since batteries or low, will schedule    2  Cont metoprolol, aspirin    F/u 1 year or sooner if more afib on loop  Orders Placed This Encounter   Procedures    POCT ECG     Medications Discontinued During This Encounter   Medication Reason    pravastatin (PRAVACHOL) 40 mg tablet Therapy completed    sildenafil (VIAGRA) 100 mg tablet Therapy completed    testosterone cypionate (DEPO-TESTOSTERONE) 200 mg/mL SOLN Therapy completed                 HPI/Subjective:     71 yo male  1) S/p successful ablation for Typical aflutter 2018  He had aflutter  w RVR found incidentally in routine physical at PCP  HR 120s, was asymptomatic, actually able to walk about  4 miles recently  No edemt, no cp, no palpitations  EF remains low normal 53%  Mild RV dilation and LUBA  No valve disease  Started on TOprol XL 100mg and ELiquis  [id number]=4 (Age and HTN)    Loop recorder showed 1hr 10min episode of afib July 2nd 2021,  no symptoms  I reviewed strips, some other false positive episodes on loop that are short, NSR w ectopy  This is only afib so far  He is out of batteries on loop  2) HTN- well controlled  3) He does snore but no daytime fatigue or other symptoms of PRUDENCIO  Denies heavy ETOH use  Chrissie Everts is doing ok  Active 3 miles a day walking  No CP/SOB/palpiations      Please note HPI is listed by problem with with update following it, it is copied again in the assessment above and reflects medical decision making as well  Complete 12 point ROS reviewed and otherwise non pertinent or negative except as per HPI pertinent positives in Cardiovascular and Respiratory emphasized  Please see paper chart for outpatient clinic patients where the patient completed the 12 point ROS survey  Past Medical History:   Diagnosis Date    Arthritis     Asthma     seasonal allergy triggered    Colon polyp     Diverticulosis     Erectile dysfunction     [place] (hard of hearing)     Hyperlipidemia     Hypertension     Irregular heart beat     hx a flutter had an ablation in past, has loop recorder    Perceptive hearing loss, both sides     waers hearing aides bilateral    Seasonal allergies     Seasonal allergies     Spinal stenosis     lumbar       Allergies   Allergen Reactions    Atorvastatin Other (See Comments)     Urinary retention     I reviewed the Home Medication list and Allergies in the chart     Scheduled Meds:  Current Outpatient Medications   Medication Sig Dispense Refill    albuterol (PROVENTIL HFA,VENTOLIN HFA) 90 mcg/act inhaler Inhale 2 puffs every 6 (six) hours as needed        aspirin 81 MG tablet Take 81 mg by mouth daily      fluticasone (FLONASE) 50 mcg/act nasal spray SPRAY 2 SPRAYS INTO EACH NOSTRIL EVERY DAY      metoprolol succinate (TOPROL-XL) 100 mg 24 hr tablet TAKE ONE TABLET BY MOUTH EVERY DAY 90 tablet 0    vitamin E, tocopherol, 400 units capsule Take 400 Units by mouth daily      loratadine (CLARITIN) 10 mg tablet Take 10 mg by mouth as needed  (Patient not taking: Reported on 8/25/2021)       No current facility-administered medications for this visit       PRN Meds:         Family History   Problem Relation Age of Onset    Lung cancer Mother     Colon cancer Mother        Social History     Socioeconomic History    Marital status: /Civil Union     Spouse name: Not on file    Number of children: Not on file    Years of education: Not on file    Highest education level: Not on file   Occupational History    Not on file   Tobacco Use    Smoking status: Former Smoker    Smokeless tobacco: Never Used    Tobacco comment: quit in 20's   Vaping Use    Vaping Use: Never used   Substance and Sexual Activity    Alcohol use: Yes     Comment: beer daily    Drug use: No    Sexual activity: Not on file   Other Topics Concern    Not on file   Social History Narrative    Not on file     Social Determinants of Health     Financial Resource Strain: Not on file   Food Insecurity: Not on file   Transportation Needs: Not on file   Physical Activity: Not on file   Stress: Not on file   Social Connections: Not on file   Intimate Partner Violence: Not on file   Housing Stability: Not on file         OBJECTIVE:    /62 (BP Location: Left arm, Patient Position: Sitting, Cuff Size: Large)   Pulse 92   Ht 5' 8" (1 727 m)   Wt 81 9 kg (180 lb 9 6 oz)   SpO2 96%   BMI 27 46 kg/m²   Vitals:    03/22/22 1627   Weight: 81 9 kg (180 lb 9 6 oz)     /62 (BP Location: Left arm, Patient Position: Sitting, Cuff Size: Large)   Pulse 92   Ht 5' 8" (1 727 m)   Wt 81 9 kg (180 lb 9 6 oz)   SpO2 96%   BMI 27 46 kg/m²   Vitals:    03/22/22 1627   Weight: 81 9 kg (180 lb 9 6 oz)     /62 (BP Location: Left arm, Patient Position: Sitting, Cuff Size: Large)   Pulse 92   Ht 5' 8" (1 727 m)   Wt 81 9 kg (180 lb 9 6 oz)   SpO2 96%   BMI 27 46 kg/m²   Vitals:    03/22/22 1627   Weight: 81 9 kg (180 lb 9 6 oz)     GEN: No acute distress, Alert and oriented, well appearing  HEENT:External ears normal, wearing a mask  EYES: Pupils equal, sclera anicteric, midline, normal conjuctiva  NECK: No JVD, supple, no obvious masses or thryomegaly or goiter  CARDIOVASCULAR:  RRR, No murmur, rub, gallops S1,S2  LUNGS: Clear To auscultation bilaterally, normal effort, no rales, rhonchi, crackles   ABDOMEN:  nondistended,  without obvious organomegaly or ascites  EXTREMITIES/VASCULAR:  No edema  warm an well perfused  PSYCH: Normal Affect,  linear speech pattern without evidence of psychosis  NEURO: Grossly intact, moving all extremiteis equal, face symmetric, alert and responsive, no obvious focal defecits   GAIT:  Ambulates normally without difficulty  HEME: No bleeding, bruising, petechia, purpura   SKIN: No significant rashes on visibile skin, warm, no diaphoresis or pallor           Lab Results:       LABS:      Chemistry        Component Value Date/Time    K 3 8 01/15/2021 0701     01/15/2021 0701    CO2 27 01/15/2021 0701    BUN 18 01/15/2021 0701    CREATININE 1 00 01/15/2021 0701        Component Value Date/Time    CALCIUM 8 6 01/15/2021 0701    ALKPHOS 68 01/15/2021 0701    AST 17 01/15/2021 0701    ALT 43 01/15/2021 0701            No results found for: CHOL  Lab Results   Component Value Date    HDL 39 (L) 12/16/2019    HDL 39 (L) 11/01/2019    HDL 41 02/11/2019     Lab Results   Component Value Date    LDLCALC 57 12/16/2019    LDLCALC 114 (H) 11/01/2019    LDLCALC 115 (H) 02/11/2019     Lab Results Component Value Date    TRIG 154 (H) 2019    TRIG 208 (H) 2019    TRIG 145 2019     No results found for: CHOLHDL    IMAGING: Xr Chest 1 View Portable    Result Date: 10/18/2018  Narrative: CHEST INDICATION:   sob  COMPARISON:  None EXAM PERFORMED/VIEWS:  XR CHEST PORTABLE FINDINGS: Cardiomediastinal silhouette appears unremarkable  The lungs are clear  No pneumothorax or pleural effusion  Osseous structures appear within normal limits for patient age  Impression: No acute cardiopulmonary disease  Workstation performed: [id number]        Cardiac testing:   Results for orders placed during the hospital encounter of 10/17/18   Echo complete with contrast if indicated    Narrative [facility] [facility]  [phone]    Transthoracic Echocardiogram  2D, M-mode, Doppler, and Color Doppler    Study date:  18-Oct-2018    Patient: Carrol Sacks  MR number: [id number]  Account number: [de-identified]  : 1950  Age: 76 years  Gender: Male  Status: Outpatient  Location: Bedside  Height: 68 in  Weight: 179 5 lb  BP: 125/ 83 mmHg    Indications: Atrial Flutter    Diagnoses: I48 1 - Atrial flutter    Sonographer:  Esperanza Bean RDCS  Primary Physician:  Kurt Dietrich MD  Referring Physician:  Renu Mckeon PA-C  Group:  [facility]  Interpreting Physician:  Jose F Mckeon MD    SUMMARY    LEFT VENTRICLE:  Systolic function was at the lower limits of normal  Ejection fraction was estimated to be 53 %  There were no regional wall motion abnormalities  RIGHT VENTRICLE:  The ventricle was mildly dilated  Systolic function was normal     RIGHT ATRIUM:  The atrium was mildly dilated  HISTORY: PRIOR HISTORY: hypertension    PROCEDURE: The procedure was performed at the bedside  This was a routine study  The transthoracic approach was used  The study included complete 2D imaging, M-mode, complete spectral Doppler, and color Doppler  The heart rate was 116 bpm,  at the start of the study  Images were obtained from the parasternal, apical, subcostal, and suprasternal notch acoustic windows  Image quality was adequate  LEFT VENTRICLE: Size was normal  Systolic function was at the lower limits of normal  Ejection fraction was estimated to be 53 %  There were no regional wall motion abnormalities  Wall thickness was normal  DOPPLER: Transmitral flow  pattern: atrial fibrillation  RIGHT VENTRICLE: The ventricle was mildly dilated  Systolic function was normal  Wall thickness was normal     LEFT ATRIUM: Size was normal     RIGHT ATRIUM: The atrium was mildly dilated  MITRAL VALVE: Valve structure was normal  There was normal leaflet separation  DOPPLER: The transmitral velocity was within the normal range  There was no evidence for stenosis  There was no significant regurgitation  AORTIC VALVE: The valve was trileaflet  Leaflets exhibited normal thickness and normal cuspal separation  DOPPLER: Transaortic velocity was within the normal range  There was no evidence for stenosis  There was no significant  regurgitation  TRICUSPID VALVE: The valve structure was normal  There was normal leaflet separation  DOPPLER: The transtricuspid velocity was within the normal range  There was no evidence for stenosis  There was no significant regurgitation  PULMONIC VALVE: Leaflets exhibited normal thickness, no calcification, and normal cuspal separation  DOPPLER: The transpulmonic velocity was within the normal range  There was no significant regurgitation  PERICARDIUM: There was no pericardial effusion  The pericardium was normal in appearance  AORTA: The root exhibited normal size  SYSTEMIC VEINS: IVC: The inferior vena cava was normal in size      SYSTEM MEASUREMENT TABLES    2D  %FS: 28 57 %  AV Diam: 3 14 cm  EDV(Teich): 82 2 ml  EF(Teich): 55 38 %  ESV(Teich): 36 68 ml  IVSd: 0 89 cm  LA Area: 17 04 cm2  LA Diam: 3 83 cm  LVEDV MOD A4C: 72 9 ml  LVEF MOD A4C: 62 23 %  LVESV MOD A4C: 27 53 ml  LVIDd: 4 28 cm  LVIDs: 3 06 cm  LVLd A4C: 7 12 cm  LVLs A4C: 5 71 cm  LVPWd: 0 9 cm  RA Area: 21 54 cm2  RVIDd: 4 02 cm  SV MOD A4C: 45 37 ml  SV(Teich): 45 53 ml    CW  TR MaxP 56 mmHg  TR Vmax: 2 15 m/s    MM  TAPSE: 2 08 cm    [facility] Accredited [facility]    Prepared and electronically signed by    Irving Fonseca MD  Signed 18-Oct-2018 15:40:38       No results found for this or any previous visit  No results found for this or any previous visit  No results found for this or any previous visit          I reviewed and interpreted the following LABS/EKG/TELE/IMAGING and below is summary of my interpretation (if data available):    LABS:normal renal function    Current EKG and Rhythm Strip:NSR, RBBB  Past EKGs and RHYTHM strip: Flutter w rvr 10/17/18 and rbbb

## 2022-04-08 ENCOUNTER — IMMUNIZATIONS (OUTPATIENT)
Dept: FAMILY MEDICINE CLINIC | Facility: HOSPITAL | Age: 72
End: 2022-04-08

## 2022-04-08 DIAGNOSIS — Z23 ENCOUNTER FOR IMMUNIZATION: Primary | ICD-10-CM

## 2022-04-08 PROCEDURE — 0064A COVID-19 MODERNA VACC 0.25 ML BOOSTER: CPT

## 2022-04-08 PROCEDURE — 91306 COVID-19 MODERNA VACC 0.25 ML BOOSTER: CPT

## 2022-04-25 ENCOUNTER — PREP FOR PROCEDURE (OUTPATIENT)
Dept: CARDIOLOGY CLINIC | Facility: CLINIC | Age: 72
End: 2022-04-25

## 2022-04-25 ENCOUNTER — TELEPHONE (OUTPATIENT)
Dept: CARDIOLOGY CLINIC | Facility: CLINIC | Age: 72
End: 2022-04-25

## 2022-04-25 DIAGNOSIS — I48.3 TYPICAL ATRIAL FLUTTER (HCC): Primary | ICD-10-CM

## 2022-04-25 NOTE — TELEPHONE ENCOUNTER
Patient scheduled for Loop explant/reimplant on 5/4/22 in B with Aide  Instructions sent to patient through [address]  Per patient, Medicare as primary insurance

## 2022-04-27 ENCOUNTER — APPOINTMENT (OUTPATIENT)
Dept: LAB | Facility: AMBULARY SURGERY CENTER | Age: 72
End: 2022-04-27
Payer: MEDICARE

## 2022-04-27 DIAGNOSIS — I48.3 TYPICAL ATRIAL FLUTTER (HCC): ICD-10-CM

## 2022-04-27 LAB
ALBUMIN SERPL BCP-MCNC: 3.9 G/DL (ref 3.5–5)
ALP SERPL-CCNC: 85 U/L (ref 46–116)
ALT SERPL W P-5'-P-CCNC: 31 U/L (ref 12–78)
ANION GAP SERPL CALCULATED.3IONS-SCNC: 3 MMOL/L (ref 4–13)
AST SERPL W P-5'-P-CCNC: 17 U/L (ref 5–45)
BASOPHILS # BLD AUTO: 0.06 THOUSANDS/ΜL (ref 0–0.1)
BASOPHILS NFR BLD AUTO: 1 % (ref 0–1)
BILIRUB SERPL-MCNC: 0.52 MG/DL (ref 0.2–1)
BUN SERPL-MCNC: 16 MG/DL (ref 5–25)
CALCIUM SERPL-MCNC: 9.4 MG/DL (ref 8.3–10.1)
CHLORIDE SERPL-SCNC: 108 MMOL/L (ref 100–108)
CO2 SERPL-SCNC: 27 MMOL/L (ref 21–32)
CREAT SERPL-MCNC: 1.09 MG/DL (ref 0.6–1.3)
EOSINOPHIL # BLD AUTO: 0.41 THOUSAND/ΜL (ref 0–0.61)
EOSINOPHIL NFR BLD AUTO: 6 % (ref 0–6)
ERYTHROCYTE [DISTWIDTH] IN BLOOD BY AUTOMATED COUNT: 12.1 % (ref 11.6–15.1)
GFR SERPL CREATININE-BSD FRML MDRD: 67 ML/MIN/1.73SQ M
GLUCOSE SERPL-MCNC: 123 MG/DL (ref 65–140)
HCT VFR BLD AUTO: 44.9 % (ref 36.5–49.3)
HGB BLD-MCNC: 15.1 G/DL (ref 12–17)
IMM GRANULOCYTES # BLD AUTO: 0.01 THOUSAND/UL (ref 0–0.2)
IMM GRANULOCYTES NFR BLD AUTO: 0 % (ref 0–2)
LYMPHOCYTES # BLD AUTO: 2.34 THOUSANDS/ΜL (ref 0.6–4.47)
LYMPHOCYTES NFR BLD AUTO: 35 % (ref 14–44)
MCH RBC QN AUTO: 30.4 PG (ref 26.8–34.3)
MCHC RBC AUTO-ENTMCNC: 33.6 G/DL (ref 31.4–37.4)
MCV RBC AUTO: 91 FL (ref 82–98)
MONOCYTES # BLD AUTO: 0.56 THOUSAND/ΜL (ref 0.17–1.22)
MONOCYTES NFR BLD AUTO: 8 % (ref 4–12)
NEUTROPHILS # BLD AUTO: 3.33 THOUSANDS/ΜL (ref 1.85–7.62)
NEUTS SEG NFR BLD AUTO: 50 % (ref 43–75)
NRBC BLD AUTO-RTO: 0 /100 WBCS
PLATELET # BLD AUTO: 203 THOUSANDS/UL (ref 149–390)
PMV BLD AUTO: 9.9 FL (ref 8.9–12.7)
POTASSIUM SERPL-SCNC: 3.9 MMOL/L (ref 3.5–5.3)
PROT SERPL-MCNC: 7.3 G/DL (ref 6.4–8.2)
RBC # BLD AUTO: 4.96 MILLION/UL (ref 3.88–5.62)
SODIUM SERPL-SCNC: 138 MMOL/L (ref 136–145)
WBC # BLD AUTO: 6.71 THOUSAND/UL (ref 4.31–10.16)

## 2022-04-27 PROCEDURE — 85025 COMPLETE CBC W/AUTO DIFF WBC: CPT

## 2022-04-27 PROCEDURE — 36415 COLL VENOUS BLD VENIPUNCTURE: CPT

## 2022-04-27 PROCEDURE — 80053 COMPREHEN METABOLIC PANEL: CPT

## 2022-05-04 ENCOUNTER — HOSPITAL ENCOUNTER (OUTPATIENT)
Facility: HOSPITAL | Age: 72
Setting detail: OUTPATIENT SURGERY
Discharge: HOME | End: 2022-05-04
Attending: INTERNAL MEDICINE | Admitting: INTERNAL MEDICINE
Payer: MEDICARE

## 2022-05-04 DIAGNOSIS — I48.3 TYPICAL ATRIAL FLUTTER (HCC): ICD-10-CM

## 2022-05-04 PROCEDURE — 33285 INSJ SUBQ CAR RHYTHM MNTR: CPT | Performed by: PHYSICIAN ASSISTANT

## 2022-05-04 PROCEDURE — NC001 PR NO CHARGE: Performed by: PHYSICIAN ASSISTANT

## 2022-05-04 PROCEDURE — 33286 RMVL SUBQ CAR RHYTHM MNTR: CPT | Performed by: PHYSICIAN ASSISTANT

## 2022-05-04 PROCEDURE — 33285 INSJ SUBQ CAR RHYTHM MNTR: CPT | Performed by: INTERNAL MEDICINE

## 2022-05-04 PROCEDURE — C1764 EVENT RECORDER, CARDIAC: HCPCS | Performed by: INTERNAL MEDICINE

## 2022-05-04 PROCEDURE — 33286 RMVL SUBQ CAR RHYTHM MNTR: CPT | Performed by: INTERNAL MEDICINE

## 2022-05-04 DEVICE — LOOP RECORDER REVEAL LINQ II SYS DEVICE ONLY: Type: IMPLANTABLE DEVICE | Site: CHEST  WALL | Status: FUNCTIONAL

## 2022-05-04 RX ORDER — LIDOCAINE HYDROCHLORIDE AND EPINEPHRINE 10; 10 MG/ML; UG/ML
INJECTION, SOLUTION INFILTRATION; PERINEURAL
Status: DISCONTINUED
Start: 2022-05-04 | End: 2022-05-04 | Stop reason: HOSPADM

## 2022-05-04 RX ORDER — LIDOCAINE HYDROCHLORIDE 10 MG/ML
INJECTION, SOLUTION EPIDURAL; INFILTRATION; INTRACAUDAL; PERINEURAL
Status: DISCONTINUED
Start: 2022-05-04 | End: 2022-05-04 | Stop reason: WASHOUT

## 2022-05-04 RX ORDER — LIDOCAINE HYDROCHLORIDE AND EPINEPHRINE 10; 10 MG/ML; UG/ML
INJECTION, SOLUTION INFILTRATION; PERINEURAL AS NEEDED
Status: DISCONTINUED | OUTPATIENT
Start: 2022-05-04 | End: 2022-05-04 | Stop reason: HOSPADM

## 2022-05-04 NOTE — H&P
H&P Exam - Cardiology   Richar rCowe 67 y o  male MRN: [id number]  Unit/Bed#: BE CATH LAB ROOM Encounter: [id number]    Assessment/Plan :  1  Typical atrial flutter      Plan for loop recorder explant and new loop implant today to continue to monitor for atrial fibrillation/flutter  History of Present Illness   HPI:  Richar Crowe is a 67y o  year old male with a PMH as stated above, who presents today to undergo elective loop recorder explant and new loop recorder implant, as he requires further monitoring for afib/flutter  Patient denies chest pain/heaviness/tightness/pressure, palpitations, shortness of breath or orthopnea  Review of Systems   All other systems reviewed and are negative            Historical Information   Past Medical History:   Diagnosis Date    Arthritis     Asthma     seasonal allergy triggered    Colon polyp     Diverticulosis     Erectile dysfunction     [place] (hard of hearing)     Hyperlipidemia     Hypertension     Irregular heart beat     hx a flutter had an ablation in past, has loop recorder    Perceptive hearing loss, both sides     waers hearing aides bilateral    Seasonal allergies     Seasonal allergies     Spinal stenosis     lumbar       Past Surgical History:   Procedure Laterality Date    CARDIAC ELECTROPHYSIOLOGY MAPPING AND ABLATION      CARDIAC ELECTROPHYSIOLOGY STUDY AND ABLATION      CARDIAC LOOP RECORDER Bilateral     COLONOSCOPY      ORCHIECTOMY Left 2006    VASECTOMY         Family History   Problem Relation Age of Onset    Lung cancer Mother     Colon cancer Mother        Social History   Social History     Substance and Sexual Activity   Alcohol Use Yes    Comment: beer daily     Social History     Substance and Sexual Activity   Drug Use No     Social History     Tobacco Use   Smoking Status Former Smoker   Smokeless Tobacco Never Used   Tobacco Comment    quit in 20's         Meds/Allergies   all medications and allergies reviewed  Home Medications:   Medications Prior to Admission   Medication    albuterol (PROVENTIL HFA,VENTOLIN HFA) 90 mcg/act inhaler    aspirin 81 MG tablet    fluticasone (FLONASE) 50 mcg/act nasal spray    loratadine (CLARITIN) 10 mg tablet    metoprolol succinate (TOPROL-XL) 100 mg 24 hr tablet    vitamin E, tocopherol, 400 units capsule       Allergies   Allergen Reactions    Atorvastatin Other (See Comments)     Urinary retention       Objective   Vitals: There were no vitals taken for this visit  No intake or output data in the 24 hours ending 05/04/22 1352    Invasive Devices  Report    Drain            External Urinary Catheter Medium 1273 days                Physical Exam  Constitutional:       General: He is not in acute distress  Appearance: Normal appearance  He is not diaphoretic  HENT:      Head: Normocephalic and atraumatic  Right Ear: External ear normal       Left Ear: External ear normal       Nose: Nose normal       Mouth/Throat:      Mouth: Mucous membranes are moist       Pharynx: Oropharynx is clear  Eyes:      General:         Right eye: No discharge  Left eye: No discharge  Cardiovascular:      Rate and Rhythm: Normal rate and regular rhythm  Pulmonary:      Effort: Pulmonary effort is normal  No accessory muscle usage or respiratory distress  Abdominal:      General: Abdomen is flat  Palpations: Abdomen is soft  Musculoskeletal:         General: No deformity or signs of injury  Cervical back: No edema or signs of trauma  Right lower leg: No edema  Left lower leg: No edema  Skin:     General: Skin is warm and dry  Coloration: Skin is not jaundiced  Neurological:      General: No focal deficit present  Mental Status: He is alert and oriented to person, place, and time  Mental status is at baseline     Psychiatric:         Mood and Affect: Mood normal          Behavior: Behavior normal          Judgment: Judgment normal              Lab Results: I have personally reviewed pertinent lab results      Results from last 7 days   Lab Units 04/27/22  1446   WBC Thousand/uL 6 71   HEMOGLOBIN g/dL 15 1   HEMATOCRIT % 44 9   PLATELETS Thousands/uL 203     Results from last 7 days   Lab Units 04/27/22  1446   POTASSIUM mmol/L 3 9   CHLORIDE mmol/L 108   CO2 mmol/L 27   BUN mg/dL 16   CREATININE mg/dL 1 09   CALCIUM mg/dL 9 4                   Code Status: Prior

## 2022-05-04 NOTE — DISCHARGE INSTRUCTIONS
LOOP RECORDER INSTRUCTIONS:  - Keep loop recorder incision dry for one week  Do not use lotions, powders, creams, or ointments on incision      - Remove outer bandage 24-48 hours after procedure  There is waterproof glue present over the incision  This should keep the incision dry  Avoid picking at the glue or peeling it off  The glue will come off in its own time      - Because the glue is waterproof, it is safe to shower if the glue remains on over the incision  It is ok to let the soap and water gently run over the incision  Avoid direct exposure to the stream of water  Do not soak the incision  Do not scrub  Pat dry  - If the glue comes off in less that 7 days, place a waterproof bandage over the incision before showering     - If present, leave underlying steri-strips in place  They will either fall off on their own or will be removed at the 2 week follow up appointment  If present, leave stitches in place  They will be removed at the 2 week follow up appointment  - Please call the office if you notice redness, swelling, bleeding, or drainage from incision or if you develop fevers  Cardiac Loop Recorder Insertion      WHAT YOU SHOULD KNOW:    A cardiac loop recorder is a device used to diagnose heart rhythm problems, such as a fast or irregular heartbeat  It is implanted in your left chest, just under the skin  The device records a pattern of your heart's rhythm, called an EKG  Your device records automatic EKGs, depending on how your caregiver programs it  You may also receive a handheld controller  You press a button on the controller when you have symptoms, such as dizziness, lightheadedness, or palpitations  The device will record an EKG at that moment  The recording can help your caregiver see if your symptoms may be caused by heart rhythm problems  Your caregiver will remove the device after it has collected enough data  You may need the device for up to 3 years   The procedure to remove the device is similar to the procedure used to implant it  AFTER YOU LEAVE:    Follow up with your cardiologist as directed: You will need to present to the office in 2 weeks  A nurse at the device clinic will check your incision and remove any stitches or steri strips that may be present  They may also program your device settings again  They will retrieve data from the device every 1 to 3 months with a monitor held over your skin  You may be able to transmit data from your device from home as well  You will do this by calling a number provided by the device clinic or as they have instructed you  Ask for information about this process  Write down your questions so you remember to ask them during your visits  Wound care: Keep loop recorder incision dry for one week  Do not use lotions, powders, creams, or ointments on incision  Remove outer bandage 24-48 hours after procedure  There is waterproof glue present over the incision  This should keep the incision dry  Avoid picking at the glue or peeling it off  The glue will come off in its own time  Because the glue is waterproof, it is safe to shower if the glue remains on over the incision  It is ok to let the soap and water gently run over the incision  Avoid direct exposure to the stream of water  Do not soak the incision  Do not scrub  Pat dry  If the glue comes off in less that 7 days, place a waterproof bandage over the incision before showering  If present, leave underlying steri-strips in place  They will either fall off on their own or will be removed at the 2 week follow up appointment  If present, leave stitches in place  They will be removed at the 2 week follow up appointment  Please call the office if you notice redness, swelling, bleeding, or drainage from incision or if you develop fevers  Return to activity: If you received anesthesia, you will not be able to drive for 24 hours   Otherwise, most people can return to normal activities soon after the procedure  Your cardiologist may want to know if your work involves electrical current or high-voltage equipment  Ask about other electrical items that could interfere with your cardiac loop recorder  Contact your cardiologist if:   · You have a fever or chills  · Your wound is red, swollen, or draining pus  · You have questions or concerns about your condition or care  Seek care immediately or call 911 if:   · You feel weak, dizzy, or faint  · You lose consciousness  © 2014 [address] is for End User's use only and may not be sold, redistributed or otherwise used for commercial purposes  All illustrations and images included in CareNotes® are the copyrighted property of A D [employer] M [employer] Inc  or Anjel Ochoa  The above information is an  only  It is not intended as medical advice for individual conditions or treatments  Talk to your doctor, nurse or pharmacist before following any medical regimen to see if it is safe and effective for you

## 2022-05-23 ENCOUNTER — IN-CLINIC DEVICE VISIT (OUTPATIENT)
Dept: CARDIOLOGY CLINIC | Facility: CLINIC | Age: 72
End: 2022-05-23

## 2022-05-23 DIAGNOSIS — Z95.818 PRESENCE OF OTHER CARDIAC IMPLANTS AND GRAFTS: Primary | ICD-10-CM

## 2022-05-23 PROCEDURE — 99024 POSTOP FOLLOW-UP VISIT: CPT | Performed by: INTERNAL MEDICINE

## 2022-05-24 NOTE — PROGRESS NOTES
Results for orders placed or performed in visit on 05/23/22   Cardiac EP device report    Narrative    MDT Nessa Meza 34: INCISION CLEAN AND DRY WITH EDGES APPROXIMATED; SUTURES REMOVED; WOUND CARE AND RESTRICTIONS REVIEWED WITH PATIENT  DEVICE INTERROGATED IN THE [place] OFFICE: BATTERY VOLTAGE ADEQUATE  NO PATIENT OR DEVICE ACTIVATED EPISODES  NORMAL DEVICE FUNCTION   NC

## 2022-06-11 DIAGNOSIS — I48.3 TYPICAL ATRIAL FLUTTER (HCC): ICD-10-CM

## 2022-06-13 RX ORDER — METOPROLOL SUCCINATE 100 MG/1
TABLET, EXTENDED RELEASE ORAL
Qty: 90 TABLET | Refills: 3 | Status: SHIPPED | OUTPATIENT
Start: 2022-06-13

## 2022-06-14 ENCOUNTER — APPOINTMENT (OUTPATIENT)
Dept: LAB | Facility: AMBULARY SURGERY CENTER | Age: 72
End: 2022-06-14
Payer: MEDICARE

## 2022-06-14 DIAGNOSIS — N40.0 BENIGN PROSTATIC HYPERPLASIA WITHOUT LOWER URINARY TRACT SYMPTOMS: ICD-10-CM

## 2022-06-14 DIAGNOSIS — E29.1 MALE HYPOGONADISM: ICD-10-CM

## 2022-06-14 LAB
ERYTHROCYTE [DISTWIDTH] IN BLOOD BY AUTOMATED COUNT: 12 % (ref 11.6–15.1)
HCT VFR BLD AUTO: 47.3 % (ref 36.5–49.3)
HGB BLD-MCNC: 15.6 G/DL (ref 12–17)
MCH RBC QN AUTO: 30.7 PG (ref 26.8–34.3)
MCHC RBC AUTO-ENTMCNC: 33 G/DL (ref 31.4–37.4)
MCV RBC AUTO: 93 FL (ref 82–98)
PLATELET # BLD AUTO: 166 THOUSANDS/UL (ref 149–390)
PMV BLD AUTO: 10.3 FL (ref 8.9–12.7)
PSA SERPL-MCNC: 1.2 NG/ML (ref 0–4)
RBC # BLD AUTO: 5.08 MILLION/UL (ref 3.88–5.62)
WBC # BLD AUTO: 6.32 THOUSAND/UL (ref 4.31–10.16)

## 2022-06-14 PROCEDURE — 84403 ASSAY OF TOTAL TESTOSTERONE: CPT

## 2022-06-14 PROCEDURE — 84153 ASSAY OF PSA TOTAL: CPT

## 2022-06-14 PROCEDURE — 36415 COLL VENOUS BLD VENIPUNCTURE: CPT

## 2022-06-14 PROCEDURE — 84402 ASSAY OF FREE TESTOSTERONE: CPT

## 2022-06-14 PROCEDURE — 85027 COMPLETE CBC AUTOMATED: CPT

## 2022-06-15 LAB
TESTOST FREE SERPL-MCNC: 8 PG/ML (ref 6.6–18.1)
TESTOST SERPL-MCNC: 272 NG/DL (ref 264–916)

## 2022-06-17 ENCOUNTER — OFFICE VISIT (OUTPATIENT)
Dept: UROLOGY | Facility: CLINIC | Age: 72
End: 2022-06-17
Payer: MEDICARE

## 2022-06-17 VITALS
BODY MASS INDEX: 26.7 KG/M2 | WEIGHT: 176.2 LBS | OXYGEN SATURATION: 96 % | SYSTOLIC BLOOD PRESSURE: 132 MMHG | HEART RATE: 88 BPM | DIASTOLIC BLOOD PRESSURE: 62 MMHG | HEIGHT: 68 IN

## 2022-06-17 DIAGNOSIS — N52.8 OTHER MALE ERECTILE DYSFUNCTION: Primary | ICD-10-CM

## 2022-06-17 PROCEDURE — 99213 OFFICE O/P EST LOW 20 MIN: CPT | Performed by: PHYSICIAN ASSISTANT

## 2022-06-17 RX ORDER — TADALAFIL 20 MG/1
20 TABLET ORAL DAILY PRN
Qty: 30 TABLET | Refills: 3 | Status: SHIPPED | OUTPATIENT
Start: 2022-06-17

## 2022-06-19 NOTE — PROGRESS NOTES
UROLOGY PROGRESS NOTE   Patient Identifiers: Kendall Heard (MRN [id number])  Date of Service: 6/19/2022    Subjective:    80-year-old man with history of testosterone deficiency and erectile dysfunction  He was getting AVEED injections but unfortunately his H&H jasmin above the  accepted level  His last testosterone was 272    He still has difficulty with erection but we discussed other factors that may be influencing at    Reason for visit:  Testosterone deficiency and erectile dysfunction    Objective:     VITALS:    Vitals:    06/17/22 0911   BP: 132/62   Pulse: 88   SpO2: 96%           LABS:  Lab Results   Component Value Date    HGB 15 6 06/14/2022    HCT 47 3 06/14/2022    WBC 6 32 06/14/2022     06/14/2022   ]    Lab Results   Component Value Date    K 3 9 04/27/2022     04/27/2022    CO2 27 04/27/2022    BUN 16 04/27/2022    CREATININE 1 09 04/27/2022    CALCIUM 9 4 04/27/2022   ]        INPATIENT MEDS:    Current Outpatient Medications:     albuterol (PROVENTIL HFA,VENTOLIN HFA) 90 mcg/act inhaler, Inhale 2 puffs every 6 (six) hours as needed  , Disp: , Rfl:     aspirin 81 MG tablet, Take 81 mg by mouth daily, Disp: , Rfl:     fluticasone (FLONASE) 50 mcg/act nasal spray, SPRAY 2 SPRAYS INTO EACH NOSTRIL EVERY DAY, Disp: , Rfl:     metoprolol succinate (TOPROL-XL) 100 mg 24 hr tablet, TAKE ONE TABLET BY MOUTH EVERY DAY, Disp: 90 tablet, Rfl: 3    tadalafil (CIALIS) 20 MG tablet, Take 1 tablet (20 mg total) by mouth daily as needed for erectile dysfunction, Disp: 30 tablet, Rfl: 3    vitamin E, tocopherol, 400 units capsule, Take 400 Units by mouth daily, Disp: , Rfl:     loratadine (CLARITIN) 10 mg tablet, Take 10 mg by mouth as needed  (Patient not taking: No sig reported), Disp: , Rfl:       Physical Exam:   /62   Pulse 88   Ht 5' 8" (1 727 m)   Wt 79 9 kg (176 lb 3 2 oz)   SpO2 96%   BMI 26 79 kg/m²   GEN: no acute distress    RESP: breathing comfortably with no accessory muscle use    ABD: soft, non-tender, non-distended   INCISION:    EXT: no significant peripheral edema    RADIOLOGY:   None     Assessment:   1  Testosterone deficiency  2   Erectile dysfunction     Plan:   -follow-up in 6 months with labs prior to visit-  -  -

## 2022-08-25 ENCOUNTER — REMOTE DEVICE CLINIC VISIT (OUTPATIENT)
Dept: CARDIOLOGY CLINIC | Facility: CLINIC | Age: 72
End: 2022-08-25
Payer: MEDICARE

## 2022-08-25 DIAGNOSIS — Z95.818 PRESENCE OF OTHER CARDIAC IMPLANTS AND GRAFTS: Primary | ICD-10-CM

## 2022-08-25 PROCEDURE — 93298 REM INTERROG DEV EVAL SCRMS: CPT | Performed by: INTERNAL MEDICINE

## 2022-08-25 PROCEDURE — G2066 INTER DEVC REMOTE 30D: HCPCS | Performed by: INTERNAL MEDICINE

## 2022-08-25 NOTE — PROGRESS NOTES
MDT LNQ22/ ACTIVE SYSTEM IS MRI CONDITIONAL   CARELINK TRANSMISSION: LOOP RECORDER  PRESENTING RHYTHM NSR W/ PAC @ 72 BPM  BATTERY STATUS "OK " 2 DEVICE CLASSIFIED AF EPISODES, AVAILABLE STRIPS DEMONSTRATE NO ATRIAL FIBRILLATION  INSTEAD EGM'S SHOW ST W/ PACs AND PVCs  AF BURDEN IS 0%  AF BURDEN MAYBE OVERESTIMATED DUE TO INAPPROPRIATE CLASSIFICATION OF AF  SOME STRIPS MAY NOT BE INTERPRETABLE OR AVAILABLE, CANNOT DEFINITIVELY RULE OUT ATRIAL FIBRILLATION  HX OF AFL  PT TAKES METOPROLOL  NO PATIENT ACTIVATED EPISODES  NORMAL DEVICE FUNCTION   DL

## 2022-09-22 ENCOUNTER — EVALUATION (OUTPATIENT)
Dept: PHYSICAL THERAPY | Facility: CLINIC | Age: 72
End: 2022-09-22
Payer: MEDICARE

## 2022-09-22 DIAGNOSIS — M51.36 LUMBAR DEGENERATIVE DISC DISEASE: Primary | ICD-10-CM

## 2022-09-22 PROCEDURE — 97110 THERAPEUTIC EXERCISES: CPT | Performed by: PHYSICAL THERAPIST

## 2022-09-22 PROCEDURE — 97161 PT EVAL LOW COMPLEX 20 MIN: CPT | Performed by: PHYSICAL THERAPIST

## 2022-09-22 NOTE — PROGRESS NOTES
PT Evaluation     Today's date: 2022  Patient name: Rosemary Heredia  : 1950  MRN: [id number]  Referring provider: Ida Rader MD  Dx:   Encounter Diagnosis     ICD-10-CM    1  Lumbar degenerative disc disease  M51 36                   Assessment  Assessment details: Rosemary Heredia is a 67 y o  male who presents with pain, decreased strength, and decreased ROM  Due to these impairments, patient has difficulty performing a/iadls and recreational activities  Patient's clinical presentation is consistent with their referring diagnosis of lumbar degenerative disc disease  Patient did not demonstrate a directional preference of the L/S upon initial evaluation  Patient would benefit from skilled physical therapy to address their aforementioned impairments, improve their level of function and to improve their overall quality of life  Impairments: abnormal or restricted ROM, impaired physical strength and pain with function  Understanding of Dx/Px/POC: good   Prognosis: good    Goals  Short term goals - to be achieved in 4 weeks:    Decrease pain 20-50%  Increase strength by 1/2 grade  Improve L/S ROM by 25%  Long term goals - to be achieved by discharge:    Lifting is improved to maximal level of function  Performance in related household activities is improved to maximal level of function  IADL performance in related activities is improved to maximal level of function  Patient will be able to transfer from sit to stand without c/o pain       Plan  Planned therapy interventions: manual therapy, neuromuscular re-education, patient education, postural training, strengthening, therapeutic activities, stretching, therapeutic exercise and home exercise program  Frequency: 2x week  Duration in visits: 12  Duration in weeks: 6  Plan of Care beginning date: 2022  Plan of Care expiration date: 11/3/2022  Treatment plan discussed with: patient        Subjective Evaluation    History of Present Illness  Mechanism of injury: Patient refers to PT with c/o pain in his low back; patient states chronic low back pain for at least ten years  Patient received X-rays of his L/S in 2017 which revealed moderate multilevel degenerative disease as evidenced by endplate sclerosis, anterior osteophytosis and mild to moderate disc height loss at L1-L5 and moderate to severe disc at L5-S1  Patient states pain is greatest in the morning  Patient denies radicular symptoms in bilateral LE's  Patient states pain and difficulty with performance of household activities including lifting and performing yard work  Patient is retired     Pain  Current pain ratin  At best pain ratin  At worst pain ratin  Quality: sharp  Relieving factors: medications  Aggravating factors: lifting and walking (Transferring from sit to stand)          Objective     Neurological Testing     Sensation     Lumbar   Left   Intact: light touch    Right   Intact: light touch    Active Range of Motion     Lumbar   Flexion:  WFL  Extension:  Restriction level: moderate  Left lateral flexion:  Restriction level: moderate  Right lateral flexion:  Restriction level: moderate    Strength/Myotome Testing     Left Hip   Planes of Motion   Flexion: 4+    Right Hip   Planes of Motion   Flexion: 5    Left Knee   Flexion: 5  Extension: 5    Right Knee   Flexion: 5  Extension: 5    Left Ankle/Foot   Dorsiflexion: 5  Plantar flexion: 5    Right Ankle/Foot   Dorsiflexion: 5  Plantar flexion: 5    General Comments:      Lumbar Comments  Repeated flexion in standing: No effect  Repeated extension in standing: No effect             Precautions: Cardiac ablation - 2018, HTN, [place]      Manuals                                                                 Neuro Re-Ed             Sup TA activation HEP            Sup TA with marches HEP            Sup TA with alt UE/LE             Alonso HEP                                                   Ther Ex Bike or TM             TB rows             TB lat pull downs             TB Multifidus press             Mini squats with TA activ                                                      Ther Activity             Lifting mechanics                          Gait Training                                       Modalities                                           HEP access code: [email or web address]

## 2022-09-26 ENCOUNTER — OFFICE VISIT (OUTPATIENT)
Dept: PHYSICAL THERAPY | Facility: CLINIC | Age: 72
End: 2022-09-26
Payer: MEDICARE

## 2022-09-26 DIAGNOSIS — M51.36 LUMBAR DEGENERATIVE DISC DISEASE: Primary | ICD-10-CM

## 2022-09-26 PROCEDURE — 97112 NEUROMUSCULAR REEDUCATION: CPT | Performed by: PHYSICAL THERAPIST

## 2022-09-26 PROCEDURE — 97110 THERAPEUTIC EXERCISES: CPT | Performed by: PHYSICAL THERAPIST

## 2022-09-26 NOTE — PROGRESS NOTES
Daily Note     Today's date: 2022  Patient name: Evelina Hill  : 1950  MRN: [id number]  Referring provider: Jes Allred MD  Dx:   Encounter Diagnosis     ICD-10-CM    1  Lumbar degenerative disc disease  M51 36                   Subjective: Patient stated no significant pain prior to treatment session  Objective: See treatment diary below      Assessment: Patient performed all exercises without c/o pain; no c/o at completion of treatment session  Plan: Continue per plan of care  Precautions: Cardiac ablation - 2018, HTN, [facility]      Manuals                                                                Neuro Re-Ed             Sup TA activation HEP 20x5"           Sup TA with marches HEP 20x ea  Sup TA with alt UE/LE  20x ea  Bridges HEP                                                   Ther Ex             Bike or TM  Bike  6 min           TB rows  BTB 3x10           TB lat pull downs  BTB 3x10           TB Multifidus press  BTB 20 ea           Mini squats with TA activ    2x10                                                  Ther Activity             Lifting mechanics                          Gait Training                                       Modalities

## 2022-09-28 ENCOUNTER — OFFICE VISIT (OUTPATIENT)
Dept: PHYSICAL THERAPY | Facility: CLINIC | Age: 72
End: 2022-09-28
Payer: MEDICARE

## 2022-09-28 DIAGNOSIS — M51.36 LUMBAR DEGENERATIVE DISC DISEASE: Primary | ICD-10-CM

## 2022-09-28 PROCEDURE — 97110 THERAPEUTIC EXERCISES: CPT | Performed by: PHYSICAL THERAPIST

## 2022-09-28 PROCEDURE — 97112 NEUROMUSCULAR REEDUCATION: CPT | Performed by: PHYSICAL THERAPIST

## 2022-09-28 NOTE — PROGRESS NOTES
Daily Note     Today's date: 2022  Patient name: Mikhail Padilla  : 1950  MRN: [id number]  Referring provider: John Garrison MD  Dx:   Encounter Diagnosis     ICD-10-CM    1  Lumbar degenerative disc disease  M51 36                   Subjective: Pt presents today stating he has been feeling some pain in the past, has had some sciatic pain in the last few days, never longer then a few moments  To L knee never below  States no symptoms in the R  Reports symptoms at most are in the AM leaning at counter top for too long, twisting or bending the wrong way  States he cannot recall the last time he went a month without back pain  Pt reports that he has sharp pains in the back and the L leg  Pt reports being better with activity and walking  Objective: See treatment diary below  L low back pain and glute currently  LS ROM Nil Flex, Mod Ext, SGIS L Mod, SGIS R Nil  B/L Strength strong and painless 5/5 all LE  DTR Patella 2+ B Achilles 2+ B  + Slump L  Postural correction B  Prone Low back B   REIL D B  Assessment: Educated about posture, finding a DP, which appeared to be REIL and mechanical diagnosis of lumbar posterior derangement  Used Back account analogy and cut finger analogy for education  Pt was in complete accord and educated to perform postural awareness, HEP minimum of 4x's a day, up to 8-10xs, slow progression of Ext first thing in the AM   Pt was in complete accord  Plan: Continue per plan of care  Precautions: Cardiac ablation - 2018, HTN, [facility]      Manuals                                                               Neuro Re-Ed             Sup TA activation HEP 20x5"           Sup TA with marches HEP 20x ea  Sup TA with alt UE/LE  20x ea  Bridges HEP                                      Postural Ed and Assessment   15 min          Ther Ex             Bike or TM  Bike  6 min           REIL   5 x 10 D B          Progression? Filemon Cedillor CESAR   nv          ERASTO   education After Noon for first trial of day          Ther Activity             Lifting mechanics             Back account Anaolgy   performed          Cut Finger Analogy   performed          Sit<>Stand education   performed                       Modalities             Prone CP/HP                                MDT Key:  ANR: Adherent Nerve root  P: Produce  B: Better  NB: No Better  W: Worse  NW: No worse  NE: No Effect    D: Decrease  I: Increase  A: Abolish  R/Rep: Repeated  EIS: Ext in Standing  EIL: Ext in Lying  FIS: Flex in standing  FISit: Flex in sitting  SGIS: Side Glide in Standing  SGIP: Side Glide in Prone   Pro: Protrusion  Ret: Retraction  SB: Side Bend  Rot: Rotation  Ext: Extension   PE'd: peripheralized  Pe'g: Peripheralizing  CE'd: centralized  Ce'g: Centralizing

## 2022-10-03 ENCOUNTER — OFFICE VISIT (OUTPATIENT)
Dept: PHYSICAL THERAPY | Facility: CLINIC | Age: 72
End: 2022-10-03
Payer: MEDICARE

## 2022-10-03 DIAGNOSIS — M51.36 LUMBAR DEGENERATIVE DISC DISEASE: Primary | ICD-10-CM

## 2022-10-03 PROCEDURE — 97112 NEUROMUSCULAR REEDUCATION: CPT | Performed by: PHYSICAL THERAPIST

## 2022-10-03 PROCEDURE — 97110 THERAPEUTIC EXERCISES: CPT | Performed by: PHYSICAL THERAPIST

## 2022-10-03 NOTE — PROGRESS NOTES
Daily Note     Today's date: 10/3/2022  Patient name: Charlene Khan  : 1950  MRN: [id number]  Referring provider: Yves Maurice MD  Dx:   Encounter Diagnosis     ICD-10-CM    1  Lumbar degenerative disc disease  M51 36                   Subjective: Pt presents today stating that other then one back day Thursday, he has been doing fairly well  He reports that he has been doing his HEP 20 reps at a time  But has been performing t/o the day  Mild Sciatica in the AM when waking up, 20 mins or so     Objective: See treatment diary below  Presents with L thigh symptoms  Assessment: Able to abolish L thigh symptoms post REIL Pt OP  Reviewed about awakening with L thigh symptoms and how to address positioning in bed  Also addressed how to comfortable sleep in bed with towel/pillows  Reviewed about performing HEP when he has symptoms, and proper frequency  Pt was in complete accord  Follow up n v  in regards of this  Plan: Continue per plan of care  Precautions: Cardiac ablation - , HTN, [facility]      Manuals 9/22 9/26 9/28 10/3                                                             Neuro Re-Ed             Sup TA activation HEP 20x5"           Sup TA with marches HEP 20x ea  Sup TA with alt UE/LE  20x ea  Bridges HEP                                      Postural Ed and Assessment   15 min 10 min         Ther Ex             Bike or TM  Bike  6 min           REIL   5 x 10 D B 4 x 10 D NB         REIL Pt OP    2 x 10 D A            progression                                       Slump Slider L   nv nv         ERASTO   education After Noon for first trial of day          Ther Activity             Lifting mechanics             Back account Anaolgy   performed          Cut Finger Analogy   performed          Sit<>Stand education   performed          Sleeping education    performed            Modalities             Prone CP/HP                                MDT Key:  ANR: Adherent Nerve root  P: Produce  B: Better  NB: No Better  W: Worse  NW: No worse  NE: No Effect    D: Decrease  I: Increase  A: Abolish  R/Rep: Repeated  EIS: Ext in Standing  EIL: Ext in Lying  FIS: Flex in standing  FISit: Flex in sitting  SGIS: Side Glide in Standing  SGIP: Side Glide in Prone   Pro: Protrusion  Ret: Retraction  SB: Side Bend  Rot: Rotation  Ext: Extension   PE'd: peripheralized  Pe'g: Peripheralizing  CE'd: centralized  Ce'g: Centralizing

## 2022-10-05 ENCOUNTER — APPOINTMENT (OUTPATIENT)
Dept: PHYSICAL THERAPY | Facility: CLINIC | Age: 72
End: 2022-10-05

## 2022-10-06 ENCOUNTER — OFFICE VISIT (OUTPATIENT)
Dept: PHYSICAL THERAPY | Facility: CLINIC | Age: 72
End: 2022-10-06
Payer: MEDICARE

## 2022-10-06 DIAGNOSIS — M51.36 LUMBAR DEGENERATIVE DISC DISEASE: Primary | ICD-10-CM

## 2022-10-06 PROCEDURE — 97112 NEUROMUSCULAR REEDUCATION: CPT

## 2022-10-06 PROCEDURE — 97110 THERAPEUTIC EXERCISES: CPT

## 2022-10-06 NOTE — PROGRESS NOTES
Daily Note     Today's date: 10/6/2022  Patient name: Radha Taylor  : 1950  MRN: [id number]  Referring provider: Bharath Thomas MD  Dx:   Encounter Diagnosis     ICD-10-CM    1  Lumbar degenerative disc disease  M51 36        Start Time: 1630  Stop Time: 1655  Total time in clinic (min): 25 minutes    Subjective: Pt reports compliance with HEP and has noticed an improvement  He was able to cut the grass today without issue  Objective: See treatment diary below  Presents asymptomatic      Assessment:  Pt tolerated session well  Pt remained asymptomatic throughout session and was able to re introduce strengthening  Pt demonstrates understanding of HEP/frequency  Plan: Continue per plan of care  Precautions: Cardiac ablation - , HTN, [facility]      Manuals 9/22 9/26 9/28 10/3 10/06                                                            Neuro Re-Ed             Sup TA activation HEP 20x5"           Sup TA with marches HEP 20x ea  Sup TA with alt UE/LE  20x ea  Bridges HEP                                      Postural Ed and Assessment   15 min 10 min 5 min         Ther Ex             Bike or TM  Bike  6 min           REIL   5 x 10 D B 4 x 10 D NB 4x10         REIL Pt OP    2 x 10 D A            progression                                       Slump Slider L   nv nv 1"x10        ERASTO   education After Noon for first trial of day          Ther Activity             Lifting mechanics             Back account Anaolgy   performed          Cut Finger Analogy   performed          Sit<>Stand education   performed  minisquat  2x10        sidesteps     5 laps         Sleeping education    performed  Modalities             Prone CP/HP                                MDT Key:  ANR: Adherent Nerve root  P: Produce  B: Better  NB: No Better  W: Worse  NW: No worse  NE: No Effect    D: Decrease  I: Increase  A: Abolish  R/Rep: Repeated  EIS: Ext in Standing  EIL: Ext in Lying  FIS: Flex in standing  FISit: Flex in sitting  SGIS: Side Glide in Standing  SGIP: Side Glide in Prone   Pro: Protrusion  Ret: Retraction  SB: Side Bend  Rot: Rotation  Ext: Extension   PE'd: peripheralized  Pe'g: Peripheralizing  CE'd: centralized  Ce'g: Centralizing

## 2022-10-10 ENCOUNTER — OFFICE VISIT (OUTPATIENT)
Dept: PHYSICAL THERAPY | Facility: CLINIC | Age: 72
End: 2022-10-10
Payer: MEDICARE

## 2022-10-10 DIAGNOSIS — M51.36 LUMBAR DEGENERATIVE DISC DISEASE: Primary | ICD-10-CM

## 2022-10-10 PROCEDURE — 97110 THERAPEUTIC EXERCISES: CPT

## 2022-10-10 PROCEDURE — 97112 NEUROMUSCULAR REEDUCATION: CPT

## 2022-10-10 NOTE — PROGRESS NOTES
Daily Note     Today's date: 10/10/2022  Patient name: Faheem Ayon  : 1950  MRN: [id number]  Referring provider: Lakia Cross MD  Dx:   Encounter Diagnosis     ICD-10-CM    1  Lumbar degenerative disc disease  M51 36        Start Time: 95  Stop Time: 1600  Total time in clinic (min): 30 minutes    Subjective: Pt reports that he has been feeling very good until he bent down to pet his cat, pain went away with 24 hours  Objective: See treatment diary below  Presents asymptomatic      Assessment:  Pt tolerated session well  Pt educated to avoid BLT's, and reviewed performance of HEP when painful  Able to tolerate strengthening  Plan: Continue per plan of care  Precautions: Cardiac ablation - , HTN, [facility]      Manuals 9/22 9/26 9/28 10/3 10/06 10/10                                                           Neuro Re-Ed             Sup TA activation HEP 20x5"           Sup TA with marches HEP 20x ea  Sup TA with alt UE/LE  20x ea  Bridges HEP            Hip abduction/exte       2x10                    Postural Ed and Assessment   15 min 10 min 5 min  5 min        Ther Ex             Bike or TM  Bike  6 min           REIL   5 x 10 D B 4 x 10 D NB 4x10  4x10        REIL Pt OP    2 x 10 D A            progression                                       Slump Slider L   nv nv 1"x10 1"x10       ERASTO   education After Noon for first trial of day          Ther Activity             Lifting mechanics             Back account Anaolgy   performed          Cut Finger Analogy   performed          Sit<>Stand education   performed  minisquat  2x10 2x10 squat and STS       sidesteps     5 laps  5 laps        Sleeping education    performed  Modalities             Prone CP/HP                                MDT Key:  ANR: Adherent Nerve root  P: Produce  B: Better  NB: No Better  W: Worse  NW: No worse  NE: No Effect    D: Decrease  I: Increase  A: Abolish  R/Rep: Repeated  EIS: Ext in Standing  EIL: Ext in Lying  FIS: Flex in standing  FISit: Flex in sitting  SGIS: Side Glide in Standing  SGIP: Side Glide in Prone   Pro: Protrusion  Ret: Retraction  SB: Side Bend  Rot: Rotation  Ext: Extension   PE'd: peripheralized  Pe'g: Peripheralizing  CE'd: centralized  Ce'g: Centralizing

## 2022-10-12 ENCOUNTER — OFFICE VISIT (OUTPATIENT)
Dept: PHYSICAL THERAPY | Facility: CLINIC | Age: 72
End: 2022-10-12
Payer: MEDICARE

## 2022-10-12 DIAGNOSIS — M51.36 LUMBAR DEGENERATIVE DISC DISEASE: Primary | ICD-10-CM

## 2022-10-12 PROCEDURE — 97110 THERAPEUTIC EXERCISES: CPT | Performed by: PHYSICAL THERAPIST

## 2022-10-12 PROCEDURE — 97112 NEUROMUSCULAR REEDUCATION: CPT | Performed by: PHYSICAL THERAPIST

## 2022-10-12 NOTE — PROGRESS NOTES
Daily Note     Today's date: 10/12/2022  Patient name: Apple Macedo  : 1950  MRN: [id number]  Referring provider: Yamilka Cha MD  Dx:   Encounter Diagnosis     ICD-10-CM    1  Lumbar degenerative disc disease  M51 36                   Subjective: Pt presents today stating that he has been feeling pretty well  Mild symptoms when he woke up this AM, but it went away very quickly  Objective: See treatment diary below  Presents asymptomatic      Assessment:  Progressing very well  Continued to assist with sleeping positions that could offer relief when he awakens in the AM   Also discussed performing HEP prior to sleep to assist with AM symptoms  No pain t/o entire session no fatigue at all  RE nv    Plan: Continue per plan of care  Precautions: Cardiac ablation - 2018, HTN, [facility]      Manuals 9/22 9/26 9/28 10/3 10/06 10/10 10/12                                                          Neuro Re-Ed             Sup TA activation HEP 20x5"           Sup TA with marches HEP 20x ea  Sup TA with alt UE/LE  20x ea  Bridges HEP            Hip abduction/exte       2x10 2 x 10                    Postural Ed and Assessment   15 min 10 min 5 min  5 min  5 min      Ther Ex             Bike or TM  Bike  6 min           REIL   5 x 10 D B 4 x 10 D NB 4x10  4x10  4 x 10      REIL Pt OP    2 x 10 D A            progression                                       Slump Slider L   nv nv 1"x10 1"x10 1" x 10      ERASTO   education After Noon for first trial of day    2 x 10      Ther Activity             Lifting mechanics             Back account Anaolgy   performed          Cut Finger Analogy   performed          Sit<>Stand education   performed  minisquat  2x10 2x10 squat and STS 2 x 10/ +Sit<>stand      sidesteps     5 laps  5 laps  5 laps      Sleeping education    performed            Modalities             Prone CP/HP       sustained x 5 mins                         MDT Key:  ANR: Adherent Nerve root  P: Produce  B: Better  NB: No Better  W: Worse  NW: No worse  NE: No Effect    D: Decrease  I: Increase  A: Abolish  R/Rep: Repeated  EIS: Ext in Standing  EIL: Ext in Lying  FIS: Flex in standing  FISit: Flex in sitting  SGIS: Side Glide in Standing  SGIP: Side Glide in Prone   Pro: Protrusion  Ret: Retraction  SB: Side Bend  Rot: Rotation  Ext: Extension   PE'd: peripheralized  Pe'g: Peripheralizing  CE'd: centralized  Ce'g: Centralizing

## 2022-10-17 ENCOUNTER — EVALUATION (OUTPATIENT)
Dept: PHYSICAL THERAPY | Facility: CLINIC | Age: 72
End: 2022-10-17
Payer: MEDICARE

## 2022-10-17 DIAGNOSIS — M51.36 LUMBAR DEGENERATIVE DISC DISEASE: Primary | ICD-10-CM

## 2022-10-17 PROCEDURE — 97112 NEUROMUSCULAR REEDUCATION: CPT | Performed by: PHYSICAL THERAPIST

## 2022-10-17 PROCEDURE — 97110 THERAPEUTIC EXERCISES: CPT | Performed by: PHYSICAL THERAPIST

## 2022-10-17 NOTE — PROGRESS NOTES
PT Evaluation     Today's date: 10/17/2022  Patient name: Lia Funez  : 1950  MRN: [id number]  Referring provider: Derek Gray MD  Dx:   Encounter Diagnosis     ICD-10-CM    1  Lumbar degenerative disc disease  M51 36                   Assessment  Assessment details: Pt is progressing very well at this time  They have demonstrated improvement in pain levels, strength, range, flexibility as well as tolerance to activity  They have achieved all STGs sought out for them as well as by them  They will benefit continued Skilled PT intervention in order to achieve all LTGs sought out for them as well as by them in order to perform all desired activities with minimal to nil symptom exacerbation  Thank you very much for this kind and motivated referral       Impairments: abnormal or restricted ROM, impaired physical strength and pain with function  Understanding of Dx/Px/POC: good   Prognosis: good    Goals  Short term goals - to be achieved in 4 weeks:    Decrease pain 20-50%  -met   Increase strength by 1/2 grade -met   Improve L/S ROM by 25%  -met  Long term goals - to be achieved by discharge:    Lifting is improved to maximal level of function  Performance in related household activities is improved to maximal level of function  IADL performance in related activities is improved to maximal level of function  Patient will be able to transfer from sit to stand without c/o pain  Plan  Patient would benefit from: skilled physical therapy  Planned therapy interventions: manual therapy, neuromuscular re-education, patient education, postural training, strengthening, therapeutic activities, stretching, therapeutic exercise and home exercise program  Frequency: 2x week  Duration in weeks: 10  Treatment plan discussed with: patient        Subjective Evaluation    History of Present Illness  Mechanism of injury: Pt presents today stating that as a whole he is feeling better    He states that the AM pain is not as severe as it was  Pt reports pain at worst is 2/10  He states that the symptoms go down as far as his knee  Pt reports that he is more cognizant of his posture, bending over, taking care of the house  Pt reports that he is performing HEP 20reps 2xs a day  Pt reports that he is content with his progression  Pt reports that his goals at this time are to be able to continue reducing pain, improve endurance and getting back to recreational exercise  Pain  Current pain ratin  At best pain ratin  At worst pain ratin  Quality: sharp  Relieving factors: medications  Aggravating factors: lifting and walking (Transferring from sit to stand)          Objective     Neurological Testing     Sensation     Lumbar   Left   Intact: light touch    Right   Intact: light touch    Active Range of Motion     Lumbar   Flexion:  WFL  Extension:  Restriction level: moderate  Left lateral flexion:  Restriction level: moderate  Right lateral flexion:  Restriction level: moderate    Strength/Myotome Testing     Left Hip   Planes of Motion   Flexion: 5    Right Hip   Planes of Motion   Flexion: 5    Left Knee   Flexion: 5  Extension: 5    Right Knee   Flexion: 5  Extension: 5    Left Ankle/Foot   Dorsiflexion: 5  Plantar flexion: 5    Right Ankle/Foot   Dorsiflexion: 5  Plantar flexion: 5    General Comments:      Lumbar Comments  REIL abolishes symptoms  Precautions: Cardiac ablation - 2018, HTN, [facility]      Manuals 9/22 9/26 9/28 10/3 10/06 10/10 10/12 10/17                                                         Neuro Re-Ed             Sup TA activation HEP 20x5"           Sup TA with marches HEP 20x ea  Sup TA with alt UE/LE  20x ea             Bridges HEP            Hip abduction/exte       2x10 2 x 10  nv                  Postural Ed and Assessment   15 min 10 min 5 min  5 min  5 min 10 min assessment      Ther Ex             Bike or TM  Bike  6 min           REIL   5 x 10 D B 4 x 10 D NB 4x10  4x10  4 x 10 4 x 10     REIL Pt OP    2 x 10 D A            progression                                       Slump Slider L   nv nv 1"x10 1"x10 1" x 10 1" x 10     ERASTO   education After Noon for first trial of day    2 x 10 2 x 10     Ther Activity             Lifting mechanics             Back account Anaolgy   performed          Cut Finger Analogy   performed          Sit<>Stand education   performed  minisquat  2x10 2x10 squat and STS 2 x 10/ +Sit<>stand nv     sidesteps     5 laps  5 laps  5 laps nv     Sleeping education    performed  Modalities             Prone CP/HP       sustained x 5 mins nv prn? MDT Key:  ANR: Adherent Nerve root  P: Produce  B: Better  NB: No Better  W: Worse  NW: No worse  NE: No Effect    D: Decrease  I: Increase  A: Abolish  R/Rep: Repeated  EIS: Ext in Standing  EIL: Ext in Lying  FIS: Flex in standing  FISit: Flex in sitting  SGIS: Side Glide in Standing  SGIP: Side Glide in Prone   Pro: Protrusion  Ret: Retraction  SB: Side Bend  Rot: Rotation  Ext: Extension   PE'd: peripheralized  Pe'g: Peripheralizing  CE'd: centralized  Ce'g: Centralizing

## 2022-10-17 NOTE — LETTER
2022    [address]  [address] [facility]    Patient: Krish Brody   YOB: 1950   Date of Visit: 10/17/2022     Encounter Diagnosis     ICD-10-CM    1  Lumbar degenerative disc disease  M51 36        Dear Dr Jailene Almendarez:    Thank you for your recent referral of Krish Brody  Please review the attached evaluation summary from Maurice's recent visit  Please verify that you agree with the plan of care by signing the attached order  If you have any questions or concerns, please do not hesitate to call  I sincerely appreciate the opportunity to share in the care of one of your patients and hope to have another opportunity to work with you in the near future  Sincerely,    Morena Crandall, PT      Referring Provider:      I certify that I have read the below Plan of Care and certify the need for these services furnished under this plan of treatment while under my care  Loretta Payan MD  [address] [place] [address]  Via Fax: [phone]          PT Evaluation     Today's date: 10/17/2022  Patient name: Krish Brody  : 1950  MRN: [id number]  Referring provider: Jacobo Cardozo MD  Dx:   Encounter Diagnosis     ICD-10-CM    1  Lumbar degenerative disc disease  M51 36                   Assessment  Assessment details: Pt is progressing very well at this time  They have demonstrated improvement in pain levels, strength, range, flexibility as well as tolerance to activity  They have achieved all STGs sought out for them as well as by them  They will benefit continued Skilled PT intervention in order to achieve all LTGs sought out for them as well as by them in order to perform all desired activities with minimal to nil symptom exacerbation    Thank you very much for this kind and motivated referral       Impairments: abnormal or restricted ROM, impaired physical strength and pain with function  Understanding of Dx/Px/POC: good   Prognosis: good    Goals  Short term goals - to be achieved in 4 weeks:    Decrease pain 20-50%  -met   Increase strength by 1/2 grade -met   Improve L/S ROM by 25%  -met  Long term goals - to be achieved by discharge:    Lifting is improved to maximal level of function  Performance in related household activities is improved to maximal level of function  IADL performance in related activities is improved to maximal level of function  Patient will be able to transfer from sit to stand without c/o pain  Plan  Patient would benefit from: skilled physical therapy  Planned therapy interventions: manual therapy, neuromuscular re-education, patient education, postural training, strengthening, therapeutic activities, stretching, therapeutic exercise and home exercise program  Frequency: 2x week  Duration in weeks: 10  Treatment plan discussed with: patient        Subjective Evaluation    History of Present Illness  Mechanism of injury: Pt presents today stating that as a whole he is feeling better  He states that the AM pain is not as severe as it was  Pt reports pain at worst is 2/10  He states that the symptoms go down as far as his knee  Pt reports that he is more cognizant of his posture, bending over, taking care of the house  Pt reports that he is performing HEP 20reps 2xs a day  Pt reports that he is content with his progression  Pt reports that his goals at this time are to be able to continue reducing pain, improve endurance and getting back to recreational exercise       Pain  Current pain ratin  At best pain ratin  At worst pain ratin  Quality: sharp  Relieving factors: medications  Aggravating factors: lifting and walking (Transferring from sit to stand)          Objective     Neurological Testing     Sensation     Lumbar   Left   Intact: light touch    Right   Intact: light touch    Active Range of Motion     Lumbar   Flexion:  WFL  Extension:  Restriction level: moderate  Left lateral flexion:  Restriction level: moderate  Right lateral flexion:  Restriction level: moderate    Strength/Myotome Testing     Left Hip   Planes of Motion   Flexion: 5    Right Hip   Planes of Motion   Flexion: 5    Left Knee   Flexion: 5  Extension: 5    Right Knee   Flexion: 5  Extension: 5    Left Ankle/Foot   Dorsiflexion: 5  Plantar flexion: 5    Right Ankle/Foot   Dorsiflexion: 5  Plantar flexion: 5    General Comments:      Lumbar Comments  REIL abolishes symptoms  Precautions: Cardiac ablation - 2018, HTN, [facility]      Manuals 9/22 9/26 9/28 10/3 10/06 10/10 10/12 10/17                                                         Neuro Re-Ed             Sup TA activation HEP 20x5"           Sup TA with marches HEP 20x ea  Sup TA with alt UE/LE  20x ea  Bridges HEP            Hip abduction/exte       2x10 2 x 10  nv                  Postural Ed and Assessment   15 min 10 min 5 min  5 min  5 min 10 min assessment      Ther Ex             Bike or TM  Bike  6 min           REIL   5 x 10 D B 4 x 10 D NB 4x10  4x10  4 x 10 4 x 10     REIL Pt OP    2 x 10 D A            progression                                       Slump Slider L   nv nv 1"x10 1"x10 1" x 10 1" x 10     ERASTO   education After Noon for first trial of day    2 x 10 2 x 10     Ther Activity             Lifting mechanics             Back account Anaolgy   performed          Cut Finger Analogy   performed          Sit<>Stand education   performed  minisquat  2x10 2x10 squat and STS 2 x 10/ +Sit<>stand nv     sidesteps     5 laps  5 laps  5 laps nv     Sleeping education    performed  Modalities             Prone CP/HP       sustained x 5 mins nv prn? MDT Key:  ANR: Adherent Nerve root  P: Produce  B: Better  NB: No Better  W: Worse  NW: No worse  NE: No Effect    D: Decrease  I: Increase  A: Abolish  R/Rep: Repeated  EIS: Ext in Standing  EIL: Ext in Lying  FIS: Flex in standing  FISit: Flex in sitting  SGIS: Side Glide in Standing  SGIP: Side Glide in Prone   Pro: Protrusion  Ret: Retraction  SB: Side Bend  Rot: Rotation  Ext: Extension   PE'd: peripheralized  Pe'g: Peripheralizing  CE'd: centralized  Ce'g: Centralizing [Follow-Up] : a follow-up evaluation of

## 2022-10-19 ENCOUNTER — APPOINTMENT (OUTPATIENT)
Dept: PHYSICAL THERAPY | Facility: CLINIC | Age: 72
End: 2022-10-19

## 2022-10-24 ENCOUNTER — APPOINTMENT (OUTPATIENT)
Dept: PHYSICAL THERAPY | Facility: CLINIC | Age: 72
End: 2022-10-24

## 2022-10-24 ENCOUNTER — APPOINTMENT (OUTPATIENT)
Dept: LAB | Facility: AMBULARY SURGERY CENTER | Age: 72
End: 2022-10-24
Payer: MEDICARE

## 2022-10-24 DIAGNOSIS — Z13.6 SCREENING FOR ISCHEMIC HEART DISEASE: ICD-10-CM

## 2022-10-24 DIAGNOSIS — E29.1 3-OXO-5 ALPHA-STEROID DELTA 4-DEHYDROGENASE DEFICIENCY: ICD-10-CM

## 2022-10-24 DIAGNOSIS — E78.5 HYPERLIPOPROTEINEMIA: ICD-10-CM

## 2022-10-24 LAB
ALBUMIN SERPL BCP-MCNC: 3.7 G/DL (ref 3.5–5)
ALP SERPL-CCNC: 75 U/L (ref 46–116)
ALT SERPL W P-5'-P-CCNC: 28 U/L (ref 12–78)
ANION GAP SERPL CALCULATED.3IONS-SCNC: 9 MMOL/L (ref 4–13)
AST SERPL W P-5'-P-CCNC: 14 U/L (ref 5–45)
BILIRUB SERPL-MCNC: 0.72 MG/DL (ref 0.2–1)
BUN SERPL-MCNC: 21 MG/DL (ref 5–25)
CALCIUM SERPL-MCNC: 9.1 MG/DL (ref 8.3–10.1)
CHLORIDE SERPL-SCNC: 111 MMOL/L (ref 96–108)
CHOLEST SERPL-MCNC: 176 MG/DL
CO2 SERPL-SCNC: 20 MMOL/L (ref 21–32)
CREAT SERPL-MCNC: 1.09 MG/DL (ref 0.6–1.3)
GFR SERPL CREATININE-BSD FRML MDRD: 67 ML/MIN/1.73SQ M
GLUCOSE P FAST SERPL-MCNC: 122 MG/DL (ref 65–99)
HDLC SERPL-MCNC: 36 MG/DL
LDLC SERPL CALC-MCNC: 95 MG/DL (ref 0–100)
NONHDLC SERPL-MCNC: 140 MG/DL
POTASSIUM SERPL-SCNC: 3.8 MMOL/L (ref 3.5–5.3)
PROT SERPL-MCNC: 6.8 G/DL (ref 6.4–8.4)
SODIUM SERPL-SCNC: 140 MMOL/L (ref 135–147)
TRIGL SERPL-MCNC: 226 MG/DL

## 2022-10-24 PROCEDURE — 36415 COLL VENOUS BLD VENIPUNCTURE: CPT

## 2022-10-24 PROCEDURE — 80053 COMPREHEN METABOLIC PANEL: CPT

## 2022-10-24 PROCEDURE — 80061 LIPID PANEL: CPT

## 2022-10-26 ENCOUNTER — OFFICE VISIT (OUTPATIENT)
Dept: PHYSICAL THERAPY | Facility: CLINIC | Age: 72
End: 2022-10-26
Payer: MEDICARE

## 2022-10-26 DIAGNOSIS — M51.36 LUMBAR DEGENERATIVE DISC DISEASE: Primary | ICD-10-CM

## 2022-10-26 PROCEDURE — 97112 NEUROMUSCULAR REEDUCATION: CPT | Performed by: PHYSICAL THERAPIST

## 2022-10-26 PROCEDURE — 97110 THERAPEUTIC EXERCISES: CPT | Performed by: PHYSICAL THERAPIST

## 2022-10-26 NOTE — PROGRESS NOTES
Daily Note     Today's date: 10/26/2022  Patient name: Rocco Gould  : 1950  MRN: [id number]  Referring provider: Kristine West MD  Dx:   Encounter Diagnosis     ICD-10-CM    1  Lumbar degenerative disc disease  M51 36                   Subjective: Pt presents today stating that he has been feeling really well  Not getting sharp shooting pain any more  Having mild low back soreness here and there  Pt reports that he is compliant with HEP  Objective: See treatment diary below      Assessment:  Entire session without symptom exacerbation  Able to progress with resistance through out entire session with out challenge  Continue to progress as able  Precautions: Cardiac ablation - , HTN, [facility]      Manuals 9/22 9/26 9/28 10/3 10/06 10/10 10/12 10/17 10/26                                                        Neuro Re-Ed             Sup TA activation HEP 20x5"           Sup TA with marches HEP 20x ea  Sup TA with alt UE/LE  20x ea  Bridges HEP            Hip abduction/exte       2x10 2 x 10  nv 2 x 10 RTB                  Postural Ed and Assessment   15 min 10 min 5 min  5 min  5 min 10 min assessment  5 min    Ther Ex             Bike or TM  Bike  6 min           REIL   5 x 10 D B 4 x 10 D NB 4x10  4x10  4 x 10 4 x 10 2 x 10    REIL Pt OP    2 x 10 D A            progression                          Mini Squats         2 x 10    Slump Slider L   nv nv 1"x10 1"x10 1" x 10 1" x 10 1" x 10    ERASTO   education After Noon for first trial of day    2 x 10 2 x 10 2 x 10    Ther Activity             Lifting mechanics             Back account Anaolgy   performed          Cut Finger Analogy   performed          Sit<>Stand education   performed  minisquat  2x10 2x10 squat and STS 2 x 10/ +Sit<>stand nv 2 x 10    sidesteps     5 laps  5 laps  5 laps nv 5 Laps RTB    Sleeping education    performed            Modalities             Prone CP/HP       sustained x 5 mins nv prn? MDT Key:  ANR: Adherent Nerve root  P: Produce  B: Better  NB: No Better  W: Worse  NW: No worse  NE: No Effect    D: Decrease  I: Increase  A: Abolish  R/Rep: Repeated  EIS: Ext in Standing  EIL: Ext in Lying  FIS: Flex in standing  FISit: Flex in sitting  SGIS: Side Glide in Standing  SGIP: Side Glide in Prone   Pro: Protrusion  Ret: Retraction  SB: Side Bend  Rot: Rotation  Ext: Extension   PE'd: peripheralized  Pe'g: Peripheralizing  CE'd: centralized  Ce'g: Centralizing

## 2022-10-31 ENCOUNTER — APPOINTMENT (OUTPATIENT)
Dept: PHYSICAL THERAPY | Facility: CLINIC | Age: 72
End: 2022-10-31

## 2022-11-02 ENCOUNTER — APPOINTMENT (OUTPATIENT)
Dept: PHYSICAL THERAPY | Facility: CLINIC | Age: 72
End: 2022-11-02

## 2022-11-09 ENCOUNTER — OFFICE VISIT (OUTPATIENT)
Dept: PHYSICAL THERAPY | Facility: CLINIC | Age: 72
End: 2022-11-09

## 2022-11-09 DIAGNOSIS — M51.36 LUMBAR DEGENERATIVE DISC DISEASE: Primary | ICD-10-CM

## 2022-11-09 NOTE — PROGRESS NOTES
Daily Note     Today's date: 2022  Patient name: Racquel Rank  : 1950  MRN: [id number]  Referring provider: Catie Nicolas MD  Dx:   Encounter Diagnosis     ICD-10-CM    1  Lumbar degenerative disc disease  M51 36                   Subjective: Pt presents today stating that he was ill last week, and didn't pass it on to his wife, due to sleeping on stiff guestroom bed  He indicates that sleeping was great last week, intending to start looking for a more firm mattress otherwise feeling really good  Objective: See treatment diary below      Assessment: Progressing very well with strength, flexibility as well as tolerance to activity  RE n v       Precautions: Cardiac ablation - , HTN, [facility]      Manuals 9/22 9/26 9/28 10/3 10/06 10/10 10/12 10/17 10/26 11/9                                                       Neuro Re-Ed             Sup TA activation HEP 20x5"           Sup TA with marches HEP 20x ea  Sup TA with alt UE/LE  20x ea  Bridges HEP            Hip abduction/exte       2x10 2 x 10  nv 2 x 10 RTB  2  x10 GTB                Postural Ed and Assessment   15 min 10 min 5 min  5 min  5 min 10 min assessment  5 min 5 min   Ther Ex             Bike or TM  Bike  6 min           REIL   5 x 10 D B 4 x 10 D NB 4x10  4x10  4 x 10 4 x 10 2 x 10 2 x 10   REIL Pt OP    2 x 10 D A            progression                          Mini Squats         2 x 10 2 x 10   Slump Slider L   nv nv 1"x10 1"x10 1" x 10 1" x 10 1" x 10 1" x 10   ERASTO   education After Noon for first trial of day    2 x 10 2 x 10 2 x 10 2 x 10   Ther Activity             Lifting mechanics             Back account Anaolgy   performed          Cut Finger Analogy   performed          Sit<>Stand education   performed  minisquat  2x10 2x10 squat and STS 2 x 10/ +Sit<>stand nv 2 x 10 2 x 10   sidesteps     5 laps  5 laps  5 laps nv 5 Laps RTB 5 laps RTB   Sleeping education    performed            Modalities Prone CP/HP       sustained x 5 mins nv prn? MDT Key:  ANR: Adherent Nerve root  P: Produce  B: Better  NB: No Better  W: Worse  NW: No worse  NE: No Effect    D: Decrease  I: Increase  A: Abolish  R/Rep: Repeated  EIS: Ext in Standing  EIL: Ext in Lying  FIS: Flex in standing  FISit: Flex in sitting  SGIS: Side Glide in Standing  SGIP: Side Glide in Prone   Pro: Protrusion  Ret: Retraction  SB: Side Bend  Rot: Rotation  Ext: Extension   PE'd: peripheralized  Pe'g: Peripheralizing  CE'd: centralized  Ce'g: Centralizing

## 2022-11-16 ENCOUNTER — EVALUATION (OUTPATIENT)
Dept: PHYSICAL THERAPY | Facility: CLINIC | Age: 72
End: 2022-11-16

## 2022-11-16 DIAGNOSIS — M51.36 LUMBAR DEGENERATIVE DISC DISEASE: Primary | ICD-10-CM

## 2022-11-16 NOTE — PROGRESS NOTES
PT Evaluation     Today's date: 2022  Patient name: Krish Brody  : 1950  MRN: [id number]  Referring provider: Jacobo Cardozo MD  Dx:   Encounter Diagnosis     ICD-10-CM    1  Lumbar degenerative disc disease  M51 36                      Assessment  Assessment details: Pt at this time demonstrates achievement of all goals sought out for him as well as by him  He has demonstrated improved endurance, strength, range, flexibility as well as tolerance to activity  If he is to have a decline in any form he is welcome to return as needed  Thank you very much for this kind, motivated and familiar referral       Impairments: abnormal or restricted ROM, impaired physical strength and pain with function  Understanding of Dx/Px/POC: good   Prognosis: good    Goals  Short term goals - to be achieved in 4 weeks:    Decrease pain 20-50%  -met   Increase strength by 1/2 grade -met   Improve L/S ROM by 25%  -met  Long term goals - to be achieved by discharge:    Lifting is improved to maximal level of function  -met   Performance in related household activities is improved to maximal level of function  -met   IADL performance in related activities is improved to maximal level of function  -met   Patient will be able to transfer from sit to stand without c/o pain   -met    Plan  Planned therapy interventions: manual therapy, neuromuscular re-education, patient education, postural training, strengthening, therapeutic activities, stretching, therapeutic exercise and home exercise program  Duration in weeks: 6  Treatment plan discussed with: patient        Subjective Evaluation    History of Present Illness  Mechanism of injury: Pt presents today stating that he is no long having sharp pain in his low back or legs  It has been not present for at least 3 week  Pt reports that he is having minimal to nil back pain  Sleeping is without challenge   He has been active with house and yard work, no issues and compliant with HEP  Pt reports that he feels as though he is ready to be DC to HEP  Pain  Current pain ratin  At best pain ratin  At worst pain ratin  Quality: sharp  Relieving factors: medications  Aggravating factors: lifting and walking (Transferring from sit to stand)          Objective     Neurological Testing     Sensation     Lumbar   Left   Intact: light touch    Right   Intact: light touch    Active Range of Motion     Lumbar   Flexion:  WFL  Extension:  WFL  Left lateral flexion:  WFL  Right lateral flexion:  [facility]    Strength/Myotome Testing     Left Hip   Planes of Motion   Flexion: 5    Right Hip   Planes of Motion   Flexion: 5    Left Knee   Flexion: 5  Extension: 5    Right Knee   Flexion: 5  Extension: 5    Left Ankle/Foot   Dorsiflexion: 5  Plantar flexion: 5    Right Ankle/Foot   Dorsiflexion: 5  Plantar flexion: 5    General Comments:      Lumbar Comments  REIL abolishes symptoms  Precautions: Cardiac ablation - , HTN, [facility]      Manuals 11/16 9/26 9/28 10/3 10/06 10/10 10/12 10/17 10/26 11/9                                                       Neuro Re-Ed             Sup TA activation  20x5"           Sup TA with marches  20x ea  Sup TA with alt UE/LE  20x ea             Bridges             Hip abduction/exte       2x10 2 x 10  nv 2 x 10 RTB  2  x10 GTB                Postural Ed and Assessment 20 mins  15 min 10 min 5 min  5 min  5 min 10 min assessment  5 min 5 min   Ther Ex             Bike or TM  Bike  6 min           REIL   5 x 10 D B 4 x 10 D NB 4x10  4x10  4 x 10 4 x 10 2 x 10 2 x 10   REIL Pt OP    2 x 10 D A            progression                          Mini Squats         2 x 10 2 x 10   Slump Slider L   nv nv 1"x10 1"x10 1" x 10 1" x 10 1" x 10 1" x 10   ERASTO   education After Noon for first trial of day    2 x 10 2 x 10 2 x 10 2 x 10   Ther Activity             Lifting mechanics             Back account Anaolgy   performed          Cut Finger Analogy performed          Sit<>Stand education   performed  minisquat  2x10 2x10 squat and STS 2 x 10/ +Sit<>stand nv 2 x 10 2 x 10   sidesteps     5 laps  5 laps  5 laps nv 5 Laps RTB 5 laps RTB   Sleeping education    performed  Modalities             Prone CP/HP       sustained x 5 mins nv prn? MDT Key:  ANR: Adherent Nerve root  P: Produce  B: Better  NB: No Better  W: Worse  NW: No worse  NE: No Effect    D: Decrease  I: Increase  A: Abolish  R/Rep: Repeated  EIS: Ext in Standing  EIL: Ext in Lying  FIS: Flex in standing  FISit: Flex in sitting  SGIS: Side Glide in Standing  SGIP: Side Glide in Prone   Pro: Protrusion  Ret: Retraction  SB: Side Bend  Rot: Rotation  Ext: Extension   PE'd: peripheralized  Pe'g: Peripheralizing  CE'd: centralized  Ce'g: Centralizing

## 2022-11-16 NOTE — LETTER
2022    [address]  [address]  [facility]    Patient: Tequila Short   YOB: 1950   Date of Visit: 2022     Encounter Diagnosis     ICD-10-CM    1  Lumbar degenerative disc disease  M51 36           Dear Dr Edith Vogt:    Thank you for your recent referral of Tequila Short  Please review the attached evaluation summary from Maurice's recent visit  Please verify that you agree with the plan of care by signing the attached order  If you have any questions or concerns, please do not hesitate to call  I sincerely appreciate the opportunity to share in the care of one of your patients and hope to have another opportunity to work with you in the near future  Sincerely,    Julita Myers, PT      Referring Provider:      I certify that I have read the below Plan of Care and certify the need for these services furnished under this plan of treatment while under my care  Rachel Bazan MD  [address] [place] [address] [employer]  [address] [place] [address]  Via Fax: [phone]          PT Evaluation     Today's date: 2022  Patient name: Tequila Short  : 1950  MRN: [id number]  Referring provider: Chava Levin MD  Dx:   Encounter Diagnosis     ICD-10-CM    1  Lumbar degenerative disc disease  M51 36                      Assessment  Assessment details: Pt at this time demonstrates achievement of all goals sought out for him as well as by him  He has demonstrated improved endurance, strength, range, flexibility as well as tolerance to activity  If he is to have a decline in any form he is welcome to return as needed  Thank you very much for this kind, motivated and familiar referral       Impairments: abnormal or restricted ROM, impaired physical strength and pain with function  Understanding of Dx/Px/POC: good   Prognosis: good    Goals  Short term goals - to be achieved in 4 weeks:    Decrease pain 20-50%   -met   Increase strength by 1/2 grade -met   Improve L/S ROM by 25%  -met  Long term goals - to be achieved by discharge:    Lifting is improved to maximal level of function  -met   Performance in related household activities is improved to maximal level of function  -met   IADL performance in related activities is improved to maximal level of function  -met   Patient will be able to transfer from sit to stand without c/o pain   -met    Plan  Planned therapy interventions: manual therapy, neuromuscular re-education, patient education, postural training, strengthening, therapeutic activities, stretching, therapeutic exercise and home exercise program  Duration in weeks: 6  Treatment plan discussed with: patient        Subjective Evaluation    History of Present Illness  Mechanism of injury: Pt presents today stating that he is no long having sharp pain in his low back or legs  It has been not present for at least 3 week  Pt reports that he is having minimal to nil back pain  Sleeping is without challenge  He has been active with house and yard work, no issues and compliant with HEP  Pt reports that he feels as though he is ready to be DC to HEP      Pain  Current pain ratin  At best pain ratin  At worst pain ratin  Quality: sharp  Relieving factors: medications  Aggravating factors: lifting and walking (Transferring from sit to stand)          Objective     Neurological Testing     Sensation     Lumbar   Left   Intact: light touch    Right   Intact: light touch    Active Range of Motion     Lumbar   Flexion:  WFL  Extension:  WFL  Left lateral flexion:  WFL  Right lateral flexion:  [facility]    Strength/Myotome Testing     Left Hip   Planes of Motion   Flexion: 5    Right Hip   Planes of Motion   Flexion: 5    Left Knee   Flexion: 5  Extension: 5    Right Knee   Flexion: 5  Extension: 5    Left Ankle/Foot   Dorsiflexion: 5  Plantar flexion: 5    Right Ankle/Foot   Dorsiflexion: 5  Plantar flexion: 5    General Comments:      Lumbar Comments  REIL abolishes symptoms  Precautions: Cardiac ablation - 2018, HTN, [facility]      Manuals 11/16 9/26 9/28 10/3 10/06 10/10 10/12 10/17 10/26 11/9                                                       Neuro Re-Ed             Sup TA activation  20x5"           Sup TA with marches  20x ea  Sup TA with alt UE/LE  20x ea  Bridges             Hip abduction/exte       2x10 2 x 10  nv 2 x 10 RTB  2  x10 GTB                Postural Ed and Assessment 20 mins  15 min 10 min 5 min  5 min  5 min 10 min assessment  5 min 5 min   Ther Ex             Bike or TM  Bike  6 min           REIL   5 x 10 D B 4 x 10 D NB 4x10  4x10  4 x 10 4 x 10 2 x 10 2 x 10   REIL Pt OP    2 x 10 D A            progression                          Mini Squats         2 x 10 2 x 10   Slump Slider L   nv nv 1"x10 1"x10 1" x 10 1" x 10 1" x 10 1" x 10   ERASTO   education After Noon for first trial of day    2 x 10 2 x 10 2 x 10 2 x 10   Ther Activity             Lifting mechanics             Back account Anaolgy   performed          Cut Finger Analogy   performed          Sit<>Stand education   performed  minisquat  2x10 2x10 squat and STS 2 x 10/ +Sit<>stand nv 2 x 10 2 x 10   sidesteps     5 laps  5 laps  5 laps nv 5 Laps RTB 5 laps RTB   Sleeping education    performed  Modalities             Prone CP/HP       sustained x 5 mins nv prn? MDT Key:  ANR: Adherent Nerve root  P: Produce  B: Better  NB: No Better  W: Worse  NW: No worse  NE: No Effect    D: Decrease  I: Increase  A: Abolish  R/Rep: Repeated  EIS: Ext in Standing  EIL: Ext in Lying  FIS: Flex in standing  FISit: Flex in sitting  SGIS: Side Glide in Standing  SGIP: Side Glide in Prone   Pro: Protrusion  Ret: Retraction  SB: Side Bend  Rot: Rotation  Ext: Extension   PE'd: peripheralized  Pe'g: Peripheralizing  CE'd: centralized  Ce'g: Centralizing

## 2022-11-23 ENCOUNTER — APPOINTMENT (OUTPATIENT)
Dept: PHYSICAL THERAPY | Facility: CLINIC | Age: 72
End: 2022-11-23

## 2022-11-29 ENCOUNTER — REMOTE DEVICE CLINIC VISIT (OUTPATIENT)
Dept: CARDIOLOGY CLINIC | Facility: CLINIC | Age: 72
End: 2022-11-29

## 2022-11-29 DIAGNOSIS — Z95.818 PRESENCE OF OTHER CARDIAC IMPLANTS AND GRAFTS: Primary | ICD-10-CM

## 2022-11-29 NOTE — PROGRESS NOTES
MDT LNQ22/ ACTIVE SYSTEM IS MRI CONDITIONAL   CARELINK TRANSMISSION: LOOP RECORDER  PRESENTING RHYTHM NSR @ 96 BPM  BATTERY STATUS "OK " 1 DEVICE CLASSIFIED AF EPISODES, AVAILABLE STRIPS DEMONSTRATE NO ATRIAL FIBRILLATION  INSTEAD EGM'S SHOW ST W/ PACs AND OVERSENSING  AF BURDEN IS 0%  AF BURDEN MAYBE OVERESTIMATED DUE TO INAPPROPRIATE CLASSIFICATION OF AF  SOME STRIPS MAY NOT BE INTERPRETABLE OR AVAILABLE, CANNOT DEFINITIVELY RULE OUT ATRIAL FIBRILLATION  HX OF AFL  PT TAKES METOPROLOL SUCC AND ASA 81  NO PATIENT ACTIVATED EPISODES  NORMAL DEVICE FUNCTION   DL

## 2022-11-30 ENCOUNTER — APPOINTMENT (OUTPATIENT)
Dept: PHYSICAL THERAPY | Facility: CLINIC | Age: 72
End: 2022-11-30

## 2022-12-16 ENCOUNTER — APPOINTMENT (OUTPATIENT)
Dept: LAB | Facility: AMBULARY SURGERY CENTER | Age: 72
End: 2022-12-16

## 2022-12-16 DIAGNOSIS — N52.8 OTHER MALE ERECTILE DYSFUNCTION: ICD-10-CM

## 2022-12-17 LAB
TESTOST FREE SERPL-MCNC: 11.6 PG/ML (ref 6.6–18.1)
TESTOST SERPL-MCNC: 241 NG/DL (ref 264–916)

## 2022-12-20 ENCOUNTER — OFFICE VISIT (OUTPATIENT)
Dept: UROLOGY | Facility: CLINIC | Age: 72
End: 2022-12-20

## 2022-12-20 VITALS
OXYGEN SATURATION: 95 % | DIASTOLIC BLOOD PRESSURE: 78 MMHG | HEART RATE: 82 BPM | SYSTOLIC BLOOD PRESSURE: 126 MMHG | BODY MASS INDEX: 26.76 KG/M2 | HEIGHT: 68 IN

## 2022-12-20 DIAGNOSIS — N40.0 BENIGN PROSTATIC HYPERPLASIA WITHOUT LOWER URINARY TRACT SYMPTOMS: Primary | ICD-10-CM

## 2022-12-20 DIAGNOSIS — E34.9 TESTOSTERONE DEFICIENCY: ICD-10-CM

## 2022-12-21 NOTE — PROGRESS NOTES
UROLOGY PROGRESS NOTE   Patient Identifiers: Alec Combs (MRN [id number])  Date of Service: 12/21/2022    Subjective:    17-year-old man history of testosterone deficiency and ADD  Was getting replacement but unfortunately developed elevated H&H to an unacceptable level  Testosterone 241  He feels fairly comfortable      Reason for visit: Testosterone deficiency follow-up    Objective:     VITALS:    Vitals:    12/20/22 1020   BP: 126/78   Pulse: 82   SpO2: 95%           LABS:  Lab Results   Component Value Date    HGB 15 6 06/14/2022    HCT 47 3 06/14/2022    WBC 6 32 06/14/2022     06/14/2022   ]    Lab Results   Component Value Date    K 3 8 10/24/2022     (H) 10/24/2022    CO2 20 (L) 10/24/2022    BUN 21 10/24/2022    CREATININE 1 09 10/24/2022    CALCIUM 9 1 10/24/2022   ]        INPATIENT MEDS:    Current Outpatient Medications:   •  albuterol (PROVENTIL HFA,VENTOLIN HFA) 90 mcg/act inhaler, Inhale 2 puffs every 6 (six) hours as needed  , Disp: , Rfl:   •  aspirin 81 MG tablet, Take 81 mg by mouth daily, Disp: , Rfl:   •  fluticasone (FLONASE) 50 mcg/act nasal spray, SPRAY 2 SPRAYS INTO EACH NOSTRIL EVERY DAY, Disp: , Rfl:   •  metoprolol succinate (TOPROL-XL) 100 mg 24 hr tablet, TAKE ONE TABLET BY MOUTH EVERY DAY, Disp: 90 tablet, Rfl: 3  •  tadalafil (CIALIS) 20 MG tablet, Take 1 tablet (20 mg total) by mouth daily as needed for erectile dysfunction, Disp: 30 tablet, Rfl: 3  •  vitamin E, tocopherol, 400 units capsule, Take 400 Units by mouth daily, Disp: , Rfl:   •  loratadine (CLARITIN) 10 mg tablet, Take 10 mg by mouth as needed  (Patient not taking: Reported on 8/25/2021), Disp: , Rfl:       Physical Exam:   /78 (BP Location: Left arm, Patient Position: Sitting, Cuff Size: Adult)   Pulse 82   Ht 5' 8" (1 727 m)   SpO2 95%   BMI 26 76 kg/m²   GEN: no acute distress    RESP: breathing comfortably with no accessory muscle use    ABD: soft, non-tender, non-distended   INCISION: EXT: no significant peripheral edema       RADIOLOGY:   None    Assessment:   #1  Testosterone deficiency  #2    BPH    Plan:   -Follow-up in 6 months with labs prior  -  -  -

## 2022-12-27 ENCOUNTER — TELEPHONE (OUTPATIENT)
Dept: OBGYN CLINIC | Facility: HOSPITAL | Age: 72
End: 2022-12-27

## 2022-12-27 NOTE — TELEPHONE ENCOUNTER
Caller: patient    Doctor: Andrea Barbosa    Reason for call: follow up bilat hand appt requesting injection    He would like this week    Call back#: [phone]

## 2023-01-26 ENCOUNTER — TELEPHONE (OUTPATIENT)
Dept: OBGYN CLINIC | Facility: HOSPITAL | Age: 73
End: 2023-01-26

## 2023-01-26 ENCOUNTER — OFFICE VISIT (OUTPATIENT)
Dept: OBGYN CLINIC | Facility: CLINIC | Age: 73
End: 2023-01-26

## 2023-01-26 VITALS
SYSTOLIC BLOOD PRESSURE: 143 MMHG | DIASTOLIC BLOOD PRESSURE: 77 MMHG | HEIGHT: 68 IN | HEART RATE: 71 BPM | WEIGHT: 176 LBS | BODY MASS INDEX: 26.67 KG/M2

## 2023-01-26 DIAGNOSIS — M18.12 ARTHRITIS OF CARPOMETACARPAL (CMC) JOINT OF LEFT THUMB: Primary | ICD-10-CM

## 2023-01-26 DIAGNOSIS — M19.041 DEGENERATIVE ARTHRITIS OF METACARPOPHALANGEAL JOINT OF MIDDLE FINGER OF RIGHT HAND: ICD-10-CM

## 2023-01-26 RX ORDER — BUPIVACAINE HYDROCHLORIDE 2.5 MG/ML
0.5 INJECTION, SOLUTION INFILTRATION; PERINEURAL
Status: COMPLETED | OUTPATIENT
Start: 2023-01-26 | End: 2023-01-26

## 2023-01-26 RX ORDER — LIDOCAINE HYDROCHLORIDE 10 MG/ML
1 INJECTION, SOLUTION INFILTRATION; PERINEURAL
Status: COMPLETED | OUTPATIENT
Start: 2023-01-26 | End: 2023-01-26

## 2023-01-26 RX ORDER — TRIAMCINOLONE ACETONIDE 40 MG/ML
20 INJECTION, SUSPENSION INTRA-ARTICULAR; INTRAMUSCULAR
Status: COMPLETED | OUTPATIENT
Start: 2023-01-26 | End: 2023-01-26

## 2023-01-26 RX ADMIN — TRIAMCINOLONE ACETONIDE 20 MG: 40 INJECTION, SUSPENSION INTRA-ARTICULAR; INTRAMUSCULAR at 15:16

## 2023-01-26 RX ADMIN — LIDOCAINE HYDROCHLORIDE 1 ML: 10 INJECTION, SOLUTION INFILTRATION; PERINEURAL at 15:16

## 2023-01-26 RX ADMIN — BUPIVACAINE HYDROCHLORIDE 0.5 ML: 2.5 INJECTION, SOLUTION INFILTRATION; PERINEURAL at 15:16

## 2023-01-26 NOTE — TELEPHONE ENCOUNTER
Caller: Patient     Doctor: [facility]    Reason for call: Would like to schedule follow up appt for 3 months from today    Call back#: [phone]

## 2023-01-26 NOTE — PROGRESS NOTES
ASSESSMENT/PLAN:      67 y o  male with left thumb CMC OA and right long finger MP joint OA     Due to acute exacerbation of chronic left thumb CMC arthritis and right long finger MP joint arthritis, the decision was made to proceed with repeat injections  Left thumb CMC CSI and right long finger MP joint CSI was performed in the office without complications  Post injection protocol/expectations were reviewed  The CSI's can be repeated on a 3 month basis as needed  We discussed warm moist heat can be beneficial  He should wear gloves or mittens when walking in the winter, to help with post stiffness after a long walk  He may use Voltaren Gel up to 4x a day, which he has at home  Follow up in the office as needed if symptoms worsen or fail to improve  The patient verbalized understanding of exam findings and treatment plan  We engaged in the shared decision-making process and treatment options were discussed at length with the patient  Surgical and conservative management discussed today along with risks and benefits  Diagnoses and all orders for this visit:    Arthritis of carpometacarpal (CMC) joint of left thumb  -     Small joint arthrocentesis: L thumb CMC    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  -     Small joint arthrocentesis: R long MCP      Follow Up:  Return if symptoms worsen or fail to improve  To Do Next Visit:  Re-evaluation of current issue    ____________________________________________________________________________________________________________________________________________      CHIEF COMPLAINT:  No chief complaint on file  SUBJECTIVE:  Yanna Box is a 67y o  year old RHD male who presents to the office for a follow up regarding left thumb CMC arthritis and right long finger MP joint arthritis  He underwent a left thumb CMC CSI and right long finger MP joint CSI on 12/29/21   He notes that the CSI's were beneficial until aprox  2-3 months  He is currently not taking anything for pain control at this time  He does have Voltaren Gel at home  I have personally reviewed all the relevant PMH, PSH, SH, FH, Medications and allergies  PAST MEDICAL HISTORY:  Past Medical History:   Diagnosis Date   • Arthritis    • Asthma     seasonal allergy triggered   • Colon polyp    • Diverticulosis    • Erectile dysfunction    • Kiana (hard of hearing)    • Hyperlipidemia    • Hypertension    • Irregular heart beat     hx a flutter had an ablation in past, has loop recorder   • Perceptive hearing loss, both sides     waers hearing aides bilateral   • Seasonal allergies    • Seasonal allergies    • Spinal stenosis     lumbar       PAST SURGICAL HISTORY:  Past Surgical History:   Procedure Laterality Date   • CARDIAC ELECTROPHYSIOLOGY MAPPING AND ABLATION     • CARDIAC ELECTROPHYSIOLOGY PROCEDURE N/A 5/4/2022    Procedure: Cardiac Loop Recorder Explant/Implant;  Surgeon: Lisa Wan MD;  Location: [facility];   Service: Cardiology   • CARDIAC ELECTROPHYSIOLOGY STUDY AND ABLATION     • CARDIAC LOOP RECORDER Bilateral    • COLONOSCOPY     • ORCHIECTOMY Left 2006   • VASECTOMY         FAMILY HISTORY:  Family History   Problem Relation Age of Onset   • Lung cancer Mother    • Colon cancer Mother        SOCIAL HISTORY:  Social History     Tobacco Use   • Smoking status: Former   • Smokeless tobacco: Never   • Tobacco comments:     quit in 20's   Vaping Use   • Vaping Use: Never used   Substance Use Topics   • Alcohol use: Yes     Comment: beer daily   • Drug use: No       MEDICATIONS:    Current Outpatient Medications:   •  albuterol (PROVENTIL HFA,VENTOLIN HFA) 90 mcg/act inhaler, Inhale 2 puffs every 6 (six) hours as needed  , Disp: , Rfl:   •  aspirin 81 MG tablet, Take 81 mg by mouth daily, Disp: , Rfl:   •  fluticasone (FLONASE) 50 mcg/act nasal spray, SPRAY 2 SPRAYS INTO EACH NOSTRIL EVERY DAY, Disp: , Rfl:   •  metoprolol succinate (TOPROL-XL) 100 mg 24 hr tablet, TAKE ONE TABLET BY MOUTH EVERY DAY, Disp: 90 tablet, Rfl: 3  •  tadalafil (CIALIS) 20 MG tablet, Take 1 tablet (20 mg total) by mouth daily as needed for erectile dysfunction, Disp: 30 tablet, Rfl: 3  •  vitamin E, tocopherol, 400 units capsule, Take 400 Units by mouth daily, Disp: , Rfl:   •  loratadine (CLARITIN) 10 mg tablet, Take 10 mg by mouth as needed  (Patient not taking: Reported on 8/25/2021), Disp: , Rfl:     ALLERGIES:  Allergies   Allergen Reactions   • Atorvastatin Other (See Comments)     Urinary retention       REVIEW OF SYSTEMS:  Review of Systems   Constitutional: Negative for chills, fever and unexpected weight change  HENT: Negative for hearing loss, nosebleeds and sore throat  Eyes: Negative for pain, redness and visual disturbance  Respiratory: Negative for cough, shortness of breath and wheezing  Cardiovascular: Negative for chest pain, palpitations and leg swelling  Gastrointestinal: Negative for abdominal pain, nausea and vomiting  Endocrine: Negative for polydipsia and polyuria  Genitourinary: Negative for difficulty urinating and hematuria  Musculoskeletal: Positive for arthralgias  Negative for joint swelling and myalgias  Skin: Negative for rash and wound  Neurological: Negative for dizziness, numbness and headaches  Psychiatric/Behavioral: Negative for decreased concentration, dysphoric mood and suicidal ideas  The patient is not nervous/anxious          VITALS:  Vitals:    01/26/23 1451   BP: 143/77   Pulse: 71       LABS:  HgA1c: No results found for: HGBA1C  BMP:   Lab Results   Component Value Date    CALCIUM 9 1 10/24/2022    K 3 8 10/24/2022    CO2 20 (L) 10/24/2022     (H) 10/24/2022    BUN 21 10/24/2022    CREATININE 1 09 10/24/2022       _____________________________________________________  PHYSICAL EXAMINATION:  General: well developed and well nourished, alert, oriented times 3 and appears comfortable  Psychiatric: Normal  HEENT: Normocephalic, Atraumatic Trachea Midline, No torticollis  Pulmonary: No audible wheezing or respiratory distress   Cardiovascular: No pitting edema, 2+ radial pulse   Abdominal/GI: abdomen non tender, non distended   Skin: No masses, erythema, lacerations, fluctation, ulcerations  Neurovascular: Sensation Intact to the Median, Ulnar, Radial Nerve, Motor Intact to the Median, Ulnar, Radial Nerve and Pulses Intact  Musculoskeletal: Normal, except as noted in detailed exam and in HPI  MUSCULOSKELETAL EXAMINATION:    Right long:   No erythema, ecchymosis or edema  Dorsal MP joint is tender to palpation   Full extension of all digits   Loose full composite fist   Brisk capillary refill     Left CMC Exam:  No adduction contracture  No hyperextension deformity of MCP joint  Positive localized tenderness over radial and dorsal aspect of thumb (CMC joint)  Grind test is Positive for pain and Positive for crepitus  Metacarpal load shift test positive   No triggering or tenderness over the A1 pulley    ___________________________________________________  STUDIES REVIEWED:  No new imaging to review           PROCEDURES PERFORMED:  Small joint arthrocentesis: L thumb CMC  Bruce Protocol:  Consent: Verbal consent obtained  Written consent not obtained  Risks and benefits: risks, benefits and alternatives were discussed  Consent given by: patient  Time out: Immediately prior to procedure a "time out" was called to verify the correct patient, procedure, equipment, support staff and site/side marked as required    Site marked: the operative site was marked  Patient identity confirmed: verbally with patient    Supporting Documentation  Indications: pain   Procedure Details  Location: thumb - L thumb CMC  Preparation: Patient was prepped and draped in the usual sterile fashion  Needle size: 25 G  Ultrasound guidance: no  Medications administered: 0 5 mL bupivacaine 0 25 %; 1 mL lidocaine 1 %; 20 mg triamcinolone acetonide 40 mg/mL    Patient tolerance: patient tolerated the procedure well with no immediate complications  Dressing:  Sterile dressing applied    Small joint arthrocentesis: R long MCP  Universal Protocol:  Consent: Verbal consent obtained  Written consent not obtained  Risks and benefits: risks, benefits and alternatives were discussed  Consent given by: patient  Time out: Immediately prior to procedure a "time out" was called to verify the correct patient, procedure, equipment, support staff and site/side marked as required    Site marked: the operative site was marked  Patient identity confirmed: verbally with patient    Supporting Documentation  Indications: pain   Procedure Details  Location: long finger - R long MCP  Preparation: Patient was prepped and draped in the usual sterile fashion  Needle size: 25 G  Ultrasound guidance: no  Medications administered: 0 5 mL bupivacaine 0 25 %; 1 mL lidocaine 1 %; 20 mg triamcinolone acetonide 40 mg/mL    Patient tolerance: patient tolerated the procedure well with no immediate complications  Dressing:  Sterile dressing applied           _____________________________________________________      Scribe Attestation    I,:  Jb Daugherty am acting as a scribe while in the presence of the attending physician :       I,:  Yvette Garcia MD personally performed the services described in this documentation    as scribed in my presence :

## 2023-02-28 ENCOUNTER — REMOTE DEVICE CLINIC VISIT (OUTPATIENT)
Dept: CARDIOLOGY CLINIC | Facility: CLINIC | Age: 73
End: 2023-02-28

## 2023-02-28 DIAGNOSIS — Z95.818 PRESENCE OF OTHER CARDIAC IMPLANTS AND GRAFTS: Primary | ICD-10-CM

## 2023-02-28 NOTE — PROGRESS NOTES
MDT LNQ22/ ACTIVE SYSTEM IS MRI CONDITIONAL   CARELINK TRANSMISSION: LOOP RECORDER  PRESENTING RHYTHM NSR @ 78 BPM  BATTERY STATUS "OK " NO PATIENT OR DEVICE ACTIVATED EPISODES  NORMAL DEVICE FUNCTION   DL

## 2023-03-07 DIAGNOSIS — I48.3 TYPICAL ATRIAL FLUTTER (HCC): ICD-10-CM

## 2023-03-07 RX ORDER — METOPROLOL SUCCINATE 100 MG/1
100 TABLET, EXTENDED RELEASE ORAL DAILY
Qty: 90 TABLET | Refills: 3 | Status: SHIPPED | OUTPATIENT
Start: 2023-03-07

## 2023-05-18 ENCOUNTER — REMOTE DEVICE CLINIC VISIT (OUTPATIENT)
Dept: CARDIOLOGY CLINIC | Facility: CLINIC | Age: 73
End: 2023-05-18

## 2023-05-18 DIAGNOSIS — Z95.818 PRESENCE OF OTHER CARDIAC IMPLANTS AND GRAFTS: Primary | ICD-10-CM

## 2023-05-18 NOTE — PROGRESS NOTES
"MDT LNQ22/ ACTIVE SYSTEM IS MRI CONDITIONAL   CARELINK TRANSMISSION: LOOP RECORDER  PRESENTING RHYTHM  NSR @ 72 BPM  BATTERY STATUS \"OK  \" NO PATIENT OR DEVICE ACTIVATED EPISODES  HOWEVER THERE IS AN EGRAM SHOWING ST W/PACs  NORMAL DEVICE FUNCTION   DL   "

## 2023-06-19 ENCOUNTER — APPOINTMENT (OUTPATIENT)
Dept: LAB | Facility: AMBULARY SURGERY CENTER | Age: 73
End: 2023-06-19
Payer: MEDICARE

## 2023-06-19 DIAGNOSIS — E34.9 TESTOSTERONE DEFICIENCY: ICD-10-CM

## 2023-06-19 DIAGNOSIS — N40.0 BENIGN PROSTATIC HYPERPLASIA WITHOUT LOWER URINARY TRACT SYMPTOMS: ICD-10-CM

## 2023-06-19 LAB
ERYTHROCYTE [DISTWIDTH] IN BLOOD BY AUTOMATED COUNT: 12.1 % (ref 11.6–15.1)
HCT VFR BLD AUTO: 46.7 % (ref 36.5–49.3)
HGB BLD-MCNC: 15.4 G/DL (ref 12–17)
MCH RBC QN AUTO: 29.7 PG (ref 26.8–34.3)
MCHC RBC AUTO-ENTMCNC: 33 G/DL (ref 31.4–37.4)
MCV RBC AUTO: 90 FL (ref 82–98)
PLATELET # BLD AUTO: 186 THOUSANDS/UL (ref 149–390)
PMV BLD AUTO: 9.8 FL (ref 8.9–12.7)
PSA SERPL-MCNC: 1.35 NG/ML (ref 0–4)
RBC # BLD AUTO: 5.18 MILLION/UL (ref 3.88–5.62)
WBC # BLD AUTO: 5.38 THOUSAND/UL (ref 4.31–10.16)

## 2023-06-19 PROCEDURE — 84402 ASSAY OF FREE TESTOSTERONE: CPT

## 2023-06-19 PROCEDURE — 84153 ASSAY OF PSA TOTAL: CPT

## 2023-06-19 PROCEDURE — 85027 COMPLETE CBC AUTOMATED: CPT

## 2023-06-19 PROCEDURE — 84403 ASSAY OF TOTAL TESTOSTERONE: CPT

## 2023-06-19 PROCEDURE — 36415 COLL VENOUS BLD VENIPUNCTURE: CPT

## 2023-06-20 LAB
TESTOST FREE SERPL-MCNC: 2.3 PG/ML (ref 6.6–18.1)
TESTOST SERPL-MCNC: 233 NG/DL (ref 264–916)

## 2023-06-22 ENCOUNTER — OFFICE VISIT (OUTPATIENT)
Dept: UROLOGY | Facility: CLINIC | Age: 73
End: 2023-06-22
Payer: MEDICARE

## 2023-06-22 VITALS
HEART RATE: 80 BPM | HEIGHT: 68 IN | DIASTOLIC BLOOD PRESSURE: 70 MMHG | BODY MASS INDEX: 28.01 KG/M2 | OXYGEN SATURATION: 98 % | WEIGHT: 184.8 LBS | SYSTOLIC BLOOD PRESSURE: 128 MMHG | RESPIRATION RATE: 16 BRPM

## 2023-06-22 DIAGNOSIS — E34.9 TESTOSTERONE DEFICIENCY: Primary | ICD-10-CM

## 2023-06-22 PROCEDURE — 99213 OFFICE O/P EST LOW 20 MIN: CPT | Performed by: PHYSICIAN ASSISTANT

## 2023-06-22 NOTE — PROGRESS NOTES
1  Testosterone deficiency  Testosterone 2 MG/24HR PT24    CBC    Testosterone            Assessment and plan:       1  Low testosterone  -We discussed alternative therapies for testosterone replacement therapy with AndroGel, Androderm, testosterone cypionate  We will plan for Androderm per patient's preference  Use and side effect profile reviewed  We will repeat a CBC and testosterone after 3 months of therapy to evaluate for therapeutic response  Allie Gonzalez PA-C      Chief Complaint     Chief Complaint   Patient presents with   • Benign Prostatic Hypertrophy   • Follow-up         History of Present Illness     Trevor Cuenca is a 68 y o  man history of testosterone deficiency and ADD  Was getting replacement but unfortunately developed elevated H&H  While on Aveed  Currently off pharmacotherapy for the past year  He does feel symptomatic of this  He has low libido and low energy levels  He is using Cialis 20 mg as needed for erectile function which she feels like is getting less efficacious now that his testosterone levels are lower  Recent  (6/19/23)  Hct 46 7  PSA 1 35          Laboratory     Lab Results   Component Value Date    CREATININE 1 09 10/24/2022       Lab Results   Component Value Date    PSA 1 35 06/19/2023    PSA 1 2 06/14/2022    PSA 0 8 01/08/2021         Review of Systems     Review of Systems   Constitutional: Positive for fatigue  Negative for activity change, appetite change, chills, diaphoresis, fever and unexpected weight change  Respiratory: Negative for chest tightness and shortness of breath  Cardiovascular: Negative for chest pain, palpitations and leg swelling  Gastrointestinal: Negative for abdominal distention, abdominal pain, constipation, diarrhea, nausea and vomiting  Genitourinary: Negative for decreased urine volume, difficulty urinating, dysuria, enuresis, flank pain, frequency, genital sores, hematuria and urgency          Low "libido   Musculoskeletal: Negative for back pain, gait problem and myalgias  Skin: Negative for color change, pallor, rash and wound  Psychiatric/Behavioral: Negative for behavioral problems  The patient is not nervous/anxious  Allergies     Allergies   Allergen Reactions   • Atorvastatin Other (See Comments)     Urinary retention       Physical Exam     Physical Exam  Constitutional:       General: He is not in acute distress  Appearance: Normal appearance  He is normal weight  He is not ill-appearing, toxic-appearing or diaphoretic  HENT:      Head: Normocephalic and atraumatic  Eyes:      General:         Right eye: No discharge  Left eye: No discharge  Conjunctiva/sclera: Conjunctivae normal    Pulmonary:      Effort: Pulmonary effort is normal  No respiratory distress  Musculoskeletal:         General: No swelling or tenderness  Normal range of motion  Skin:     General: Skin is warm and dry  Coloration: Skin is not jaundiced or pale  Findings: No erythema  Neurological:      General: No focal deficit present  Mental Status: He is alert and oriented to person, place, and time  Psychiatric:         Mood and Affect: Mood normal          Behavior: Behavior normal          Thought Content:  Thought content normal          Vital Signs     Vitals:    06/22/23 1054   BP: 128/70   BP Location: Left arm   Patient Position: Sitting   Cuff Size: Large   Pulse: 80   Resp: 16   SpO2: 98%   Weight: 83 8 kg (184 lb 12 8 oz)   Height: 5' 8\" (1 727 m)         Current Medications       Current Outpatient Medications:   •  albuterol (PROVENTIL HFA,VENTOLIN HFA) 90 mcg/act inhaler, Inhale 2 puffs every 6 (six) hours as needed  , Disp: , Rfl:   •  aspirin 81 MG tablet, Take 81 mg by mouth daily, Disp: , Rfl:   •  fluticasone (FLONASE) 50 mcg/act nasal spray, SPRAY 2 SPRAYS INTO EACH NOSTRIL EVERY DAY, Disp: , Rfl:   •  metoprolol succinate (TOPROL-XL) 100 mg 24 hr tablet, Take 1 " tablet (100 mg total) by mouth daily, Disp: 90 tablet, Rfl: 3  •  tadalafil (CIALIS) 20 MG tablet, Take 1 tablet (20 mg total) by mouth daily as needed for erectile dysfunction, Disp: 30 tablet, Rfl: 3  •  Testosterone 2 MG/24HR PT24, Place 1 patch on the skin over 24 hours daily, Disp: 30 patch, Rfl: 4  •  vitamin E, tocopherol, 400 units capsule, Take 400 Units by mouth daily, Disp: , Rfl:   •  loratadine (CLARITIN) 10 mg tablet, Take 10 mg by mouth as needed  (Patient not taking: Reported on 8/25/2021), Disp: , Rfl:       Active Problems     Patient Active Problem List   Diagnosis   • Typical atrial flutter (HCC)   • RBBB   • Polycythemia         Past Medical History     Past Medical History:   Diagnosis Date   • Arthritis    • Asthma     seasonal allergy triggered   • Colon polyp    • Diverticulosis    • Erectile dysfunction    • [place] (hard of hearing)    • Hyperlipidemia    • Hypertension    • Irregular heart beat     hx a flutter had an ablation in past, has loop recorder   • Perceptive hearing loss, both sides     waers hearing aides bilateral   • Seasonal allergies    • Seasonal allergies    • Spinal stenosis     lumbar         Surgical History     Past Surgical History:   Procedure Laterality Date   • CARDIAC ELECTROPHYSIOLOGY MAPPING AND ABLATION     • CARDIAC ELECTROPHYSIOLOGY PROCEDURE N/A 5/4/2022    Procedure: Cardiac Loop Recorder Explant/Implant;  Surgeon: Jade Bragg MD;  [facility];   Service: Cardiology   • CARDIAC ELECTROPHYSIOLOGY STUDY AND ABLATION     • CARDIAC LOOP RECORDER Bilateral    • COLONOSCOPY     • ORCHIECTOMY Left 2006   • VASECTOMY           Family History     Family History   Problem Relation Age of Onset   • Lung cancer Mother    • Colon cancer Mother          Social History     Social History       Radiology

## 2023-08-02 ENCOUNTER — REMOTE DEVICE CLINIC VISIT (OUTPATIENT)
Dept: CARDIOLOGY CLINIC | Facility: CLINIC | Age: 73
End: 2023-08-02
Payer: MEDICARE

## 2023-08-02 ENCOUNTER — OFFICE VISIT (OUTPATIENT)
Dept: OBGYN CLINIC | Facility: CLINIC | Age: 73
End: 2023-08-02

## 2023-08-02 VITALS — HEIGHT: 68 IN | WEIGHT: 185 LBS | BODY MASS INDEX: 28.04 KG/M2

## 2023-08-02 DIAGNOSIS — M18.12 ARTHRITIS OF CARPOMETACARPAL (CMC) JOINT OF LEFT THUMB: Primary | ICD-10-CM

## 2023-08-02 DIAGNOSIS — M19.041 DEGENERATIVE ARTHRITIS OF METACARPOPHALANGEAL JOINT OF MIDDLE FINGER OF RIGHT HAND: ICD-10-CM

## 2023-08-02 DIAGNOSIS — Z95.818 PRESENCE OF OTHER CARDIAC IMPLANTS AND GRAFTS: Primary | ICD-10-CM

## 2023-08-02 PROCEDURE — G2066 INTER DEVC REMOTE 30D: HCPCS | Performed by: INTERNAL MEDICINE

## 2023-08-02 PROCEDURE — 93298 REM INTERROG DEV EVAL SCRMS: CPT | Performed by: INTERNAL MEDICINE

## 2023-08-02 RX ORDER — TRIAMCINOLONE ACETONIDE 40 MG/ML
20 INJECTION, SUSPENSION INTRA-ARTICULAR; INTRAMUSCULAR
Status: COMPLETED | OUTPATIENT
Start: 2023-08-02 | End: 2023-08-02

## 2023-08-02 RX ORDER — BUPIVACAINE HYDROCHLORIDE 2.5 MG/ML
0.5 INJECTION, SOLUTION INFILTRATION; PERINEURAL
Status: COMPLETED | OUTPATIENT
Start: 2023-08-02 | End: 2023-08-02

## 2023-08-02 RX ORDER — LIDOCAINE HYDROCHLORIDE 10 MG/ML
1 INJECTION, SOLUTION INFILTRATION; PERINEURAL
Status: COMPLETED | OUTPATIENT
Start: 2023-08-02 | End: 2023-08-02

## 2023-08-02 RX ADMIN — LIDOCAINE HYDROCHLORIDE 1 ML: 10 INJECTION, SOLUTION INFILTRATION; PERINEURAL at 10:30

## 2023-08-02 RX ADMIN — BUPIVACAINE HYDROCHLORIDE 0.5 ML: 2.5 INJECTION, SOLUTION INFILTRATION; PERINEURAL at 10:30

## 2023-08-02 RX ADMIN — TRIAMCINOLONE ACETONIDE 20 MG: 40 INJECTION, SUSPENSION INTRA-ARTICULAR; INTRAMUSCULAR at 10:30

## 2023-08-02 NOTE — PROGRESS NOTES
MDT LNQ22/ ACTIVE SYSTEM IS MRI CONDITIONAL   CARELINK TRANSMISSION: LOOP RECORDER. PRESENTING RHYTHM NSR W/ OVERSENSING. BATTERY STATUS "OK." NO PATIENT OR DEVICE ACTIVATED EPISODES. HOWEVER THERE IS AN EGRAM SHOWING ST W/ PACs. NORMAL DEVICE FUNCTION.  DL

## 2023-08-02 NOTE — PROGRESS NOTES
ASSESSMENT/PLAN:      68 y.o. male with left thumb CMC OA and right long finger MP joint OA    Due to acute exacerbation of chronic left thumb CMC arthritis and right long finger MP joint arthritis, the decision was made to proceed with repeat CSI's. Left thumb CMC and right long finger MP joint CSI's were performed in the office without complication. Post injection protocol/expectations were reviewed. The CSI's may be repeated on a 3 month basis as needed. Continue Advil for pain control. Voltaren Gel and warm moist heat can be beneficial.      The patient verbalized understanding of exam findings and treatment plan. We engaged in the shared decision-making process and treatment options were discussed at length with the patient. Surgical and conservative management discussed today along with risks and benefits. 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 To Do Next Visit:  Re-evaluation of current issue    ____________________________________________________________________________________________________________________________________________      CHIEF COMPLAINT:  Chief Complaint   Patient presents with   • Follow-up     Patient would like a left thumb injection today        SUBJECTIVE:  Yisel Tamayo is a 68y.o. year old RHD male who presents ti the office for left thumb pain and right long finger pain. He was last seen in the office in January, at which time he underwent a left thumb CMC CSI and right long finger MP joint CSI. he notes that the CSI's were beneficial for approx. 6 months. He notes the CSI's do resolve all of his pain.  He notes pain to the base of his left thumb. He also notes pain to his right long finger MCP joint. He is taking Advil for pain control. I have personally reviewed all the relevant PMH, PSH, SH, FH, Medications and allergies. PAST MEDICAL HISTORY:  Past Medical History:   Diagnosis Date   • Arthritis    • Asthma     seasonal allergy triggered   • Colon polyp    • Diverticulosis    • Erectile dysfunction    • [place] (hard of hearing)    • Hyperlipidemia    • Hypertension    • Irregular heart beat     hx a flutter had an ablation in past, has loop recorder   • Perceptive hearing loss, both sides     waers hearing aides bilateral   • Seasonal allergies    • Seasonal allergies    • Spinal stenosis     lumbar       PAST SURGICAL HISTORY:  Past Surgical History:   Procedure Laterality Date   • CARDIAC ELECTROPHYSIOLOGY MAPPING AND ABLATION     • CARDIAC ELECTROPHYSIOLOGY PROCEDURE N/A 5/4/2022    Procedure: Cardiac Loop Recorder Explant/Implant;  Surgeon: Zo Wood MD;  [facility];   Service: Cardiology   • CARDIAC ELECTROPHYSIOLOGY STUDY AND ABLATION     • CARDIAC LOOP RECORDER Bilateral    • COLONOSCOPY     • ORCHIECTOMY Left 2006   • VASECTOMY         FAMILY HISTORY:  Family History   Problem Relation Age of Onset   • Lung cancer Mother    • Colon cancer Mother        SOCIAL HISTORY:  Social History     Tobacco Use   • Smoking status: Former   • Smokeless tobacco: Never   • Tobacco comments:     quit in 20's   Vaping Use   • Vaping Use: Never used   Substance Use Topics   • Alcohol use: Yes     Comment: beer daily   • Drug use: No       MEDICATIONS:    Current Outpatient Medications:   •  albuterol (PROVENTIL HFA,VENTOLIN HFA) 90 mcg/act inhaler, Inhale 2 puffs every 6 (six) hours as needed  , Disp: , Rfl:   •  fluticasone (FLONASE) 50 mcg/act nasal spray, SPRAY 2 SPRAYS INTO EACH NOSTRIL EVERY DAY, Disp: , Rfl:   •  metoprolol succinate (TOPROL-XL) 100 mg 24 hr tablet, Take 1 tablet (100 mg total) by mouth daily, Disp: 90 tablet, Rfl: 3  •  tadalafil (CIALIS) 20 MG tablet, Take 1 tablet (20 mg total) by mouth daily as needed for erectile dysfunction, Disp: 30 tablet, Rfl: 3  •  Testosterone 2 MG/24HR PT24, Place 1 patch on the skin over 24 hours daily, Disp: 30 patch, Rfl: 4  •  vitamin E, tocopherol, 400 units capsule, Take 400 Units by mouth daily, Disp: , Rfl:   •  aspirin 81 MG tablet, Take 81 mg by mouth daily, Disp: , Rfl:   •  loratadine (CLARITIN) 10 mg tablet, Take 10 mg by mouth as needed  (Patient not taking: Reported on 8/2/2023), Disp: , Rfl:     ALLERGIES:  Allergies   Allergen Reactions   • Atorvastatin Other (See Comments)     Urinary retention       REVIEW OF SYSTEMS:  Review of Systems   Constitutional: Negative for chills, fever and unexpected weight change. HENT: Negative for hearing loss, nosebleeds and sore throat. Eyes: Negative for pain, redness and visual disturbance. Respiratory: Negative for cough, shortness of breath and wheezing. Cardiovascular: Negative for chest pain, palpitations and leg swelling. Gastrointestinal: Negative for abdominal pain, nausea and vomiting. Endocrine: Negative for polydipsia and polyuria. Genitourinary: Negative for difficulty urinating and hematuria. Musculoskeletal: Positive for arthralgias. Negative for joint swelling and myalgias. Skin: Negative for rash and wound. Neurological: Negative for dizziness, numbness and headaches. Psychiatric/Behavioral: Negative for decreased concentration, dysphoric mood and suicidal ideas. The patient is not nervous/anxious.         VITALS:  Vitals:       LABS:  HgA1c: No results found for: "HGBA1C"  BMP:   Lab Results   Component Value Date    CALCIUM 9.1 10/24/2022    K 3.8 10/24/2022    CO2 20 (L) 10/24/2022     (H) 10/24/2022    BUN 21 10/24/2022    CREATININE 1.09 10/24/2022       _____________________________________________________  PHYSICAL EXAMINATION:  General: well developed and well nourished, alert, oriented times 3 and appears comfortable  Psychiatric: Normal  HEENT: Normocephalic, Atraumatic Trachea Midline, No torticollis  Pulmonary: No audible wheezing or respiratory distress   Cardiovascular: No pitting edema, 2+ radial pulse   Abdominal/GI: abdomen non tender, non distended   Skin: No masses, erythema, lacerations, fluctation, ulcerations  Neurovascular: Sensation Intact to the Median, Ulnar, Radial Nerve, Motor Intact to the Median, Ulnar, Radial Nerve and Pulses Intact  Musculoskeletal: Normal, except as noted in detailed exam and in HPI. MUSCULOSKELETAL EXAMINATION:    Left CMC Exam:  No adduction contracture  No hyperextension deformity of MCP joint  Positive localized tenderness over radial and dorsal aspect of thumb (CMC joint)  Grind test is Positive for pain and Positive for crepitus  Metacarpal load shift test positive   No triggering or tenderness over the A1 pulley    Right long finger:   No erythema or ecchymosis   Mild MP joint edema  Dorsal MP joint is tender to palpation   Full extension   Full composite fist   Brisk capillary refill     ___________________________________________________  STUDIES REVIEWED:  No new imaging to review           PROCEDURES PERFORMED:  Small joint arthrocentesis: L thumb CMC  [place] Protocol:  Consent: Verbal consent obtained. Written consent not obtained. Risks and benefits: risks, benefits and alternatives were discussed  Consent given by: patient  Time out: Immediately prior to procedure a "time out" was called to verify the correct patient, procedure, equipment, support staff and site/side marked as required.   Patient understanding: patient states understanding of the procedure being performed  Site marked: the operative site was marked  Patient identity confirmed: verbally with patient    Supporting Documentation  Indications: pain   Procedure Details  Location: thumb - L thumb CMC  Preparation: Patient was prepped and draped in the usual sterile fashion  Needle size: 25 G  Ultrasound guidance: no  Medications administered: 0.5 mL bupivacaine 0.25 %; 1 mL lidocaine 1 %; 20 mg triamcinolone acetonide 40 mg/mL    Patient tolerance: patient tolerated the procedure well with no immediate complications  Dressing:  Sterile dressing applied    Small joint arthrocentesis: R long MCP  Universal Protocol:  Consent: Verbal consent obtained. Written consent not obtained. Risks and benefits: risks, benefits and alternatives were discussed  Consent given by: patient  Time out: Immediately prior to procedure a "time out" was called to verify the correct patient, procedure, equipment, support staff and site/side marked as required.   Patient understanding: patient states understanding of the procedure being performed  Site marked: the operative site was marked  Patient identity confirmed: verbally with patient    Supporting Documentation  Indications: pain   Procedure Details  Location: long finger - R long MCP  Preparation: Patient was prepped and draped in the usual sterile fashion  Needle size: 25 G  Ultrasound guidance: no  Medications administered: 0.5 mL bupivacaine 0.25 %; 1 mL lidocaine 1 %; 20 mg triamcinolone acetonide 40 mg/mL    Patient tolerance: patient tolerated the procedure well with no immediate complications  Dressing:  Sterile dressing applied           _____________________________________________________      Scribe Attestation    I,:  Kvng Daugherty am acting as a scribe while in the presence of the attending physician.:       I,:  Deidra Bonner MD personally performed the services described in this documentation    as scribed in my presence.:

## 2023-08-18 ENCOUNTER — APPOINTMENT (OUTPATIENT)
Dept: LAB | Facility: AMBULARY SURGERY CENTER | Age: 73
End: 2023-08-18
Payer: MEDICARE

## 2023-08-18 DIAGNOSIS — E34.9 TESTOSTERONE DEFICIENCY: ICD-10-CM

## 2023-08-18 LAB
ERYTHROCYTE [DISTWIDTH] IN BLOOD BY AUTOMATED COUNT: 12.3 % (ref 11.6–15.1)
HCT VFR BLD AUTO: 46.6 % (ref 36.5–49.3)
HGB BLD-MCNC: 15.7 G/DL (ref 12–17)
MCH RBC QN AUTO: 30.4 PG (ref 26.8–34.3)
MCHC RBC AUTO-ENTMCNC: 33.7 G/DL (ref 31.4–37.4)
MCV RBC AUTO: 90 FL (ref 82–98)
PLATELET # BLD AUTO: 184 THOUSANDS/UL (ref 149–390)
PMV BLD AUTO: 10.2 FL (ref 8.9–12.7)
RBC # BLD AUTO: 5.17 MILLION/UL (ref 3.88–5.62)
TESTOST SERPL-MSCNC: 190 NG/DL
WBC # BLD AUTO: 6.28 THOUSAND/UL (ref 4.31–10.16)

## 2023-08-18 PROCEDURE — 84403 ASSAY OF TOTAL TESTOSTERONE: CPT

## 2023-08-18 PROCEDURE — 36415 COLL VENOUS BLD VENIPUNCTURE: CPT

## 2023-08-18 PROCEDURE — 85027 COMPLETE CBC AUTOMATED: CPT

## 2023-10-03 ENCOUNTER — OFFICE VISIT (OUTPATIENT)
Dept: UROLOGY | Facility: CLINIC | Age: 73
End: 2023-10-03
Payer: MEDICARE

## 2023-10-03 VITALS
RESPIRATION RATE: 18 BRPM | OXYGEN SATURATION: 98 % | WEIGHT: 185 LBS | SYSTOLIC BLOOD PRESSURE: 140 MMHG | HEIGHT: 68 IN | DIASTOLIC BLOOD PRESSURE: 80 MMHG | BODY MASS INDEX: 28.04 KG/M2 | HEART RATE: 78 BPM

## 2023-10-03 DIAGNOSIS — E34.9 TESTOSTERONE DEFICIENCY: Primary | ICD-10-CM

## 2023-10-03 PROCEDURE — 99213 OFFICE O/P EST LOW 20 MIN: CPT | Performed by: PHYSICIAN ASSISTANT

## 2023-10-03 RX ORDER — TESTOSTERONE GEL, 1% 10 MG/G
50 GEL TRANSDERMAL DAILY
Qty: 30 PACKET | Refills: 5 | Status: SHIPPED | OUTPATIENT
Start: 2023-10-03

## 2023-10-03 NOTE — PROGRESS NOTES
1. Testosterone deficiency  testosterone (ANDROGEL) 1%    Testosterone, free, total    CBC            Assessment and plan:       1. Low testosterone  -We discussed alternative therapies for testosterone replacement therapy with AndroGel, Androderm, testosterone cypionate. We will plan for androgel. Use and side effect profile reviewed. We will repeat a CBC and testosterone after 6 months of therapy to evaluate for therapeutic response. Kathleen Cruz PA-C      Chief Complaint     Low testosterone    History of Present Illness     Fern Doyle is a 68 y.o. man history of testosterone deficiency and ADD. Was getting replacement but unfortunately developed elevated H&H. While on Aveed. Currently off pharmacotherapy for the past year. He does feel symptomatic of this. He has low libido and low energy levels. He is using Cialis 20 mg as needed for erectile function which she feels like is getting less efficacious now that his testosterone levels are lower. Androderm was prescribed at last visit. Unfortunately not covered by insurance and patient has not started any TRT since last visit. Recent  (68/18/23)  Hct 46.7  PSA 1.35          Laboratory     Lab Results   Component Value Date    CREATININE 1.09 10/24/2022       Lab Results   Component Value Date    PSA 1.35 06/19/2023    PSA 1.2 06/14/2022    PSA 0.8 01/08/2021         Review of Systems     Review of Systems   Constitutional:  Positive for fatigue. Negative for activity change, appetite change, chills, diaphoresis, fever and unexpected weight change. Respiratory:  Negative for chest tightness and shortness of breath. Cardiovascular:  Negative for chest pain, palpitations and leg swelling. Gastrointestinal:  Negative for abdominal distention, abdominal pain, constipation, diarrhea, nausea and vomiting.    Genitourinary:  Negative for decreased urine volume, difficulty urinating, dysuria, enuresis, flank pain, frequency, genital sores, hematuria and urgency. Low libido   Musculoskeletal:  Negative for back pain, gait problem and myalgias. Skin:  Negative for color change, pallor, rash and wound. Psychiatric/Behavioral:  Negative for behavioral problems. The patient is not nervous/anxious. Allergies     Allergies   Allergen Reactions    Atorvastatin Other (See Comments)     Urinary retention       Physical Exam     Physical Exam  Constitutional:       General: He is not in acute distress. Appearance: Normal appearance. He is normal weight. He is not ill-appearing, toxic-appearing or diaphoretic. HENT:      Head: Normocephalic and atraumatic. Eyes:      General:         Right eye: No discharge. Left eye: No discharge. Conjunctiva/sclera: Conjunctivae normal.   Pulmonary:      Effort: Pulmonary effort is normal. No respiratory distress. Musculoskeletal:         General: No swelling or tenderness. Normal range of motion. Skin:     General: Skin is warm and dry. Coloration: Skin is not jaundiced or pale. Findings: No erythema. Neurological:      General: No focal deficit present. Mental Status: He is alert and oriented to person, place, and time. Psychiatric:         Mood and Affect: Mood normal.         Behavior: Behavior normal.         Thought Content:  Thought content normal.         Vital Signs     Vitals:    10/03/23 0740   BP: 140/80   BP Location: Left arm   Patient Position: Sitting   Cuff Size: Standard   Pulse: 78   Resp: 18   SpO2: 98%   Weight: 83.9 kg (185 lb)   Height: 5' 8" (1.727 m)         Current Medications       Current Outpatient Medications:     albuterol (PROVENTIL HFA,VENTOLIN HFA) 90 mcg/act inhaler, Inhale 2 puffs every 6 (six) hours as needed  , Disp: , Rfl:     fluticasone (FLONASE) 50 mcg/act nasal spray, SPRAY 2 SPRAYS INTO EACH NOSTRIL EVERY DAY, Disp: , Rfl:     metoprolol succinate (TOPROL-XL) 100 mg 24 hr tablet, Take 1 tablet (100 mg total) by mouth daily, Disp: 90 tablet, Rfl: 3    tadalafil (CIALIS) 20 MG tablet, Take 1 tablet (20 mg total) by mouth daily as needed for erectile dysfunction, Disp: 30 tablet, Rfl: 3    testosterone (ANDROGEL) 1%, Apply 1 packet (50 mg total) topically daily, Disp: 30 packet, Rfl: 5    vitamin E, tocopherol, 400 units capsule, Take 400 Units by mouth daily, Disp: , Rfl:     aspirin 81 MG tablet, Take 81 mg by mouth daily (Patient not taking: Reported on 10/3/2023), Disp: , Rfl:     loratadine (CLARITIN) 10 mg tablet, Take 10 mg by mouth as needed  (Patient not taking: Reported on 8/2/2023), Disp: , Rfl:       Active Problems     Patient Active Problem List   Diagnosis    Typical atrial flutter (HCC)    RBBB    Polycythemia         Past Medical History     Past Medical History:   Diagnosis Date    Arthritis     Asthma     seasonal allergy triggered    Colon polyp     Diverticulosis     Erectile dysfunction     [place] (hard of hearing)     Hyperlipidemia     Hypertension     Irregular heart beat     hx a flutter had an ablation in past, has loop recorder    Perceptive hearing loss, both sides     waers hearing aides bilateral    Seasonal allergies     Seasonal allergies     Spinal stenosis     lumbar         Surgical History     Past Surgical History:   Procedure Laterality Date    CARDIAC ELECTROPHYSIOLOGY MAPPING AND ABLATION      CARDIAC ELECTROPHYSIOLOGY PROCEDURE N/A 5/4/2022    Procedure: Cardiac Loop Recorder Explant/Implant;  Surgeon: Darby Garibay MD;  Location: [facility];   Service: Cardiology    CARDIAC ELECTROPHYSIOLOGY STUDY AND ABLATION      CARDIAC LOOP RECORDER Bilateral     COLONOSCOPY      ORCHIECTOMY Left 2006    VASECTOMY           Family History     Family History   Problem Relation Age of Onset    Lung cancer Mother     Colon cancer Mother          Social History     Social History       Radiology

## 2023-10-17 ENCOUNTER — TELEPHONE (OUTPATIENT)
Age: 73
End: 2023-10-17

## 2023-10-17 NOTE — TELEPHONE ENCOUNTER
Patient reports his wife would just prefer him on to not be on androgel. He previously was on Aveed but it was stopped due to a rise in his h&h.

## 2023-10-17 NOTE — TELEPHONE ENCOUNTER
PT called and did not  medication; testosterone (ANDROGEL) 1%  due to wife not wanting him to take it. PT is asking for alternative medications or care.     [address] [facility] [address] [place], [place] - [address]  [address], [address] [address]  Phone: [phone]  Fax: [phone]       Call back [phone]

## 2023-11-01 ENCOUNTER — REMOTE DEVICE CLINIC VISIT (OUTPATIENT)
Dept: CARDIOLOGY CLINIC | Facility: CLINIC | Age: 73
End: 2023-11-01
Payer: MEDICARE

## 2023-11-01 DIAGNOSIS — Z95.818 PRESENCE OF OTHER CARDIAC IMPLANTS AND GRAFTS: Primary | ICD-10-CM

## 2023-11-01 PROCEDURE — 93298 REM INTERROG DEV EVAL SCRMS: CPT | Performed by: INTERNAL MEDICINE

## 2023-11-01 PROCEDURE — G2066 INTER DEVC REMOTE 30D: HCPCS | Performed by: INTERNAL MEDICINE

## 2023-11-01 NOTE — PROGRESS NOTES
MDT LNQ22/ ACTIVE SYSTEM IS MRI CONDITIONAL   CARELINK TRANSMISSION: LOOP RECORDER. PRESENTING RHYTHM OVERSENSING. BATTERY STATUS "OK." 1 TACHY EPISODE W/ EGRAM SHOWING ST @ 122 BPM. NO PATIENT ACTIVATED EPISODES. NORMAL DEVICE FUNCTION.  DL

## 2024-01-31 ENCOUNTER — REMOTE DEVICE CLINIC VISIT (OUTPATIENT)
Dept: CARDIOLOGY CLINIC | Facility: CLINIC | Age: 74
End: 2024-01-31
Payer: MEDICARE

## 2024-01-31 ENCOUNTER — OFFICE VISIT (OUTPATIENT)
Dept: CARDIOLOGY CLINIC | Facility: CLINIC | Age: 74
End: 2024-01-31
Payer: MEDICARE

## 2024-01-31 VITALS
BODY MASS INDEX: 27.2 KG/M2 | DIASTOLIC BLOOD PRESSURE: 76 MMHG | HEIGHT: 68 IN | WEIGHT: 179.5 LBS | SYSTOLIC BLOOD PRESSURE: 142 MMHG | HEART RATE: 72 BPM

## 2024-01-31 DIAGNOSIS — I48.3 TYPICAL ATRIAL FLUTTER (HCC): ICD-10-CM

## 2024-01-31 DIAGNOSIS — I45.10 RBBB: ICD-10-CM

## 2024-01-31 DIAGNOSIS — I47.29 NSVT (NONSUSTAINED VENTRICULAR TACHYCARDIA) (HCC): ICD-10-CM

## 2024-01-31 DIAGNOSIS — Z95.818 PRESENCE OF OTHER CARDIAC IMPLANTS AND GRAFTS: Primary | ICD-10-CM

## 2024-01-31 DIAGNOSIS — D75.1 POLYCYTHEMIA: ICD-10-CM

## 2024-01-31 DIAGNOSIS — I11.9 HYPERTENSIVE HEART DISEASE WITHOUT HEART FAILURE: ICD-10-CM

## 2024-01-31 PROBLEM — I49.3 PVCS (PREMATURE VENTRICULAR CONTRACTIONS): Status: ACTIVE | Noted: 2024-01-31

## 2024-01-31 PROCEDURE — 93298 REM INTERROG DEV EVAL SCRMS: CPT | Performed by: INTERNAL MEDICINE

## 2024-01-31 PROCEDURE — 93000 ELECTROCARDIOGRAM COMPLETE: CPT | Performed by: PHYSICIAN ASSISTANT

## 2024-01-31 PROCEDURE — 99214 OFFICE O/P EST MOD 30 MIN: CPT | Performed by: PHYSICIAN ASSISTANT

## 2024-01-31 NOTE — PROGRESS NOTES
"MDT LNQ22/ ACTIVE SYSTEM IS MRI CONDITIONAL   CARELINK TRANSMISSION: LOOP RECORDER. PRESENTING RHYTHM NSR W/ PVC @ 60 BPM. BATTERY STATUS \"OK.\" NO PATIENT OR DEVICE ACTIVATED EPISODES. NORMAL DEVICE FUNCTION. DL   "

## 2024-01-31 NOTE — PATIENT INSTRUCTIONS
You had non-sustained ventricular tachycardia (NSVT) on your loop recorder - this is a fast heart rhythm from the bottom chamber of the heart  Schedule echocardiogram and stress test to check your heart function  Continue Toprol  mg once daily  Contact us for results once your tests are complete

## 2024-01-31 NOTE — PROGRESS NOTES
EPS Consultation/New Patient Evaluation   [facility]  [facility] [address], [place] [address]    Name: Maurice Wilder  : 1950  MRN: [id number]    ASSESSMENT:  1. Presence of other cardiac implants and grafts  POCT ECG      2. Typical atrial flutter (HCC)        3. RBBB        4. Polycythemia        5. NSVT (nonsustained ventricular tachycardia) (HCC)            PLAN:  MMVT on loop recorder 2024 lasting 10 seconds, 188 bpm  -- occurred at 5:21 in am, patient was sleeping  -- no exertional chest pain or dyspnea  -- echo and nuc stress test ordered today  -- continue Toprol  mg daily  PVCs - 3.7% burden on loop recorder, patient is asymptomatic  Medtronic loop recorder (explant/reimplant May 2022)  PAF - only one episode found on loop recorder lasting 1 hour 10 min 21  -- maintained off anticoagulation, [id number] = 2 (age, HTN)  Typical atrial flutter s/p CTI ablation 2018 by Dr. Goyal  -- was asymptomatic with atrial flutter  -- echo at the time showed low normal EF 53%  Mild RV dilation and LUBA. No valve disease.  HTN - controlled    I reviewed the findings of monomorphic VT on the loop recorder with Gianluca.  He has not had exertional symptoms.  He is also asymptomatic with respect to his PVCs. I ordered a nuclear stress test and echocardiogram.  He will continue Toprol- mg once daily.  Further recommendations will be made based on his test results.    He has very low burden A-fib and has not had any recent episodes.  He remains off anticoagulation.  His [id number] is low at 2 (HTN, age). He will continue Toprol XL.     We will follow up with him regarding his stress test and echocardiogram when the results are available.       CC/HPI:   Maurice Wilder is a 73 year old male with a history of typical atrial flutter status post CTI ablation in 2018, low burden of PAF noted on loop recorder, Medtronic loop recorder s/p explant and  "reimplant May 2022, who presents for evaluation of Afib.     Atrial flutter with RVR was found incidentally during routine physical by his PCP.  His heart rate was in the 120s and he was asymptomatic.  He was able to walk 4 miles with no symptoms.  His EF was low normal at 53% and mild RV dilatation and LUBA with no valvular disease.  He was started on Toprol- mg daily and Eliquis.  His GFO2IB3-SGBs is 2 for age and hypertension.    He underwent loop recorder implantation that showed 1 hour and 10-minute episode of A-fib in July 2021.  He did not have any symptoms.  There were some false positive episodes on his loop recorder.  He underwent loop explant and reimplant in May 2022.    On 1/16/2024.  He was noted to have monomorphic VT lasting 10 seconds at a rate of 188 bpm that occurred at 5:21 AM.  He was presumably sleeping at the time and was asymptomatic.    He presents today to discuss the nonsustained VT on his loop recorder.  He has been feeling well with no changes in his health.  He is able to walk several miles.  He is active around the house.  He denies exertional chest pain or dyspnea.  He and his wife recently moved to [place] to be closer to their daughter, after living in [place] for his entire life.  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     EKG 1/31/2024: normal sinus rhythm with RBBB, occasional PVCs, HR 72 bpm,  ms,  ms, Qtc 459 ms. PVC morphology transition in V2, inferiorly directed    Review of Systems   Constitutional: Negative.    HENT: Negative.     Eyes: Negative.    Respiratory: Negative.     Cardiovascular: Negative.    Gastrointestinal: Negative.    Endocrine: Negative.    Genitourinary: Negative.    Musculoskeletal: Negative.    Skin:  Negative for rash.   Neurological:  Negative for dizziness and weakness.     Objective:     Vitals: Blood pressure 142/76, pulse 72, height 5' 8\" (1.727 m), weight 81.4 " kg (179 lb 8 oz)., Body mass index is 27.29 kg/m².,      Physical Exam:   GEN: NAD, alert and oriented x 3, well appearing  SKIN: warm, dry without significant lesions or rashes  HEENT: NCAT  NECK: supple, no JVD appreciated  CARDIOVASCULAR: RRR, normal S1, S2 without murmurs, rubs, or gallops   LUNGS: CTA bilaterally without wheezes, rhonchi, or rales  ABDOMEN: Soft, nontender, nondistended.   EXTREMITIES/VASCULAR: perfused without clubbing, cyanosis, or LE edema b/l  PSYCH: Normal mood and affect  NEURO: CN ll-Xll grossly intact    Medications:      Current Outpatient Medications:     albuterol (PROVENTIL HFA,VENTOLIN HFA) 90 mcg/act inhaler, Inhale 2 puffs every 6 (six) hours as needed  , Disp: , Rfl:     metoprolol succinate (TOPROL-XL) 100 mg 24 hr tablet, Take 1 tablet (100 mg total) by mouth daily, Disp: 90 tablet, Rfl: 3    vitamin E, tocopherol, 400 units capsule, Take 400 Units by mouth daily, Disp: , Rfl:     aspirin 81 MG tablet, Take 81 mg by mouth daily (Patient not taking: Reported on 10/3/2023), Disp: , Rfl:     fluticasone (FLONASE) 50 mcg/act nasal spray, SPRAY 2 SPRAYS INTO EACH NOSTRIL EVERY DAY, Disp: , Rfl:     loratadine (CLARITIN) 10 mg tablet, Take 10 mg by mouth as needed  (Patient not taking: Reported on 8/2/2023), Disp: , Rfl:     tadalafil (CIALIS) 20 MG tablet, Take 1 tablet (20 mg total) by mouth daily as needed for erectile dysfunction (Patient not taking: Reported on 1/31/2024), Disp: 30 tablet, Rfl: 3    testosterone (ANDROGEL) 1%, Apply 1 packet (50 mg total) topically daily (Patient not taking: Reported on 1/31/2024), Disp: 30 packet, Rfl: 5       Historical Information   Past Medical History:   Diagnosis Date    Arthritis     Asthma     seasonal allergy triggered    Colon polyp     Diverticulosis     Erectile dysfunction     [place] (hard of hearing)     Hyperlipidemia     Hypertension     Irregular heart beat     hx a flutter had an ablation in past, has loop recorder    Perceptive  hearing loss, both sides     waers hearing aides bilateral    Seasonal allergies     Seasonal allergies     Spinal stenosis     lumbar       Past Surgical History:   Procedure Laterality Date    CARDIAC ELECTROPHYSIOLOGY MAPPING AND ABLATION      CARDIAC ELECTROPHYSIOLOGY PROCEDURE N/A 5/4/2022    Procedure: Cardiac Loop Recorder Explant/Implant;  Surgeon: Demetri Pryor MD;  [facility];  Service: Cardiology    CARDIAC ELECTROPHYSIOLOGY STUDY AND ABLATION      CARDIAC LOOP RECORDER Bilateral     COLONOSCOPY      ORCHIECTOMY Left 2006    VASECTOMY         Social History     Substance and Sexual Activity   Alcohol Use Yes    Comment: beer daily     Social History     Substance and Sexual Activity   Drug Use No     Social History     Tobacco Use   Smoking Status Former   Smokeless Tobacco Never   Tobacco Comments    quit in 20's       Family History   Problem Relation Age of Onset    Lung cancer Mother     Colon cancer Mother        Labs & Results:  Below is the patient's most recent value for Albumin, ALT, AST, BUN, Calcium, Chloride, Cholesterol, CO2, Creatinine, GFR, Glucose, HDL, Hematocrit, Hemoglobin, Hemoglobin A1C, LDL, Magnesium, Phosphorus, Platelets, Potassium, PSA, Sodium, Triglycerides, and WBC.   Lab Results   Component Value Date    ALT 28 10/24/2022    AST 14 10/24/2022    BUN 21 10/24/2022    CALCIUM 9.1 10/24/2022     (H) 10/24/2022    CO2 20 (L) 10/24/2022    CREATININE 1.09 10/24/2022    HDL 36 (L) 10/24/2022    HCT 46.6 08/18/2023    HGB 15.7 08/18/2023    MG 2.1 10/18/2018     08/18/2023    K 3.8 10/24/2022    PSA 1.35 06/19/2023    TRIG 226 (H) 10/24/2022    WBC 6.28 08/18/2023     Note: for a comprehensive list of the patient's lab results, access the Results Review activity.

## 2024-02-07 ENCOUNTER — OFFICE VISIT (OUTPATIENT)
Dept: OBGYN CLINIC | Facility: CLINIC | Age: 74
End: 2024-02-07
Payer: MEDICARE

## 2024-02-07 VITALS
HEIGHT: 68 IN | DIASTOLIC BLOOD PRESSURE: 71 MMHG | WEIGHT: 180 LBS | SYSTOLIC BLOOD PRESSURE: 163 MMHG | BODY MASS INDEX: 27.28 KG/M2 | HEART RATE: 66 BPM

## 2024-02-07 DIAGNOSIS — M19.041 DEGENERATIVE ARTHRITIS OF METACARPOPHALANGEAL JOINT OF MIDDLE FINGER OF RIGHT HAND: ICD-10-CM

## 2024-02-07 DIAGNOSIS — M18.12 ARTHRITIS OF CARPOMETACARPAL (CMC) JOINT OF LEFT THUMB: Primary | ICD-10-CM

## 2024-02-07 PROCEDURE — 20600 DRAIN/INJ JOINT/BURSA W/O US: CPT | Performed by: SURGERY

## 2024-02-07 PROCEDURE — 99214 OFFICE O/P EST MOD 30 MIN: CPT | Performed by: SURGERY

## 2024-02-07 RX ORDER — LIDOCAINE HYDROCHLORIDE 10 MG/ML
1 INJECTION, SOLUTION INFILTRATION; PERINEURAL
Status: COMPLETED | OUTPATIENT
Start: 2024-02-07 | End: 2024-02-07

## 2024-02-07 RX ORDER — TRIAMCINOLONE ACETONIDE 40 MG/ML
20 INJECTION, SUSPENSION INTRA-ARTICULAR; INTRAMUSCULAR
Status: COMPLETED | OUTPATIENT
Start: 2024-02-07 | End: 2024-02-07

## 2024-02-07 RX ADMIN — LIDOCAINE HYDROCHLORIDE 1 ML: 10 INJECTION, SOLUTION INFILTRATION; PERINEURAL at 09:00

## 2024-02-07 RX ADMIN — TRIAMCINOLONE ACETONIDE 20 MG: 40 INJECTION, SUSPENSION INTRA-ARTICULAR; INTRAMUSCULAR at 09:00

## 2024-02-07 NOTE — PROGRESS NOTES
ASSESSMENT/PLAN:      73 year old male here for his left thumb CMC joint OA and right long MP joint OA.  He continues to have significant relief with the cortisone injections. Today he wished to continue with the injections, which he tolerated well. We will follow up in 6 months for another set of injections.     The patient verbalized understanding of exam findings and treatment plan. We engaged in the shared decision-making process and treatment options were discussed at length with the patient. Surgical and conservative management discussed today along with risks and benefits.    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      To Do Next Visit:  Re-evaluation of current issue    ____________________________________________________________________________________________________________________________________________      CHIEF COMPLAINT:  Chief Complaint   Patient presents with    Injections     Patient would like left cmc injections last was 8/2/23 possible right long mcp injection        SUBJECTIVE:  Maurice Wilder is a 73 y.o. year old RHD male who presents today for a 6-month follow-up for her left thumb CMC joint osteoarthritis and arthritis of the MP joint of the right middle finger.  At his last visit on 8/2/2023, patient had injections into both of the joints which continue to give him significant relief. He does use heat and Voltaren gel.        I have personally reviewed all the relevant PMH, PSH, SH, FH, Medications and allergies.     PAST MEDICAL HISTORY:  Past Medical History:   Diagnosis Date    Arthritis     Asthma     seasonal allergy triggered    Colon polyp      Diverticulosis     Erectile dysfunction     [place] (hard of hearing)     Hyperlipidemia     Hypertension     Irregular heart beat     hx a flutter had an ablation in past, has loop recorder    Perceptive hearing loss, both sides     waers hearing aides bilateral    Seasonal allergies     Seasonal allergies     Spinal stenosis     lumbar       PAST SURGICAL HISTORY:  Past Surgical History:   Procedure Laterality Date    CARDIAC ELECTROPHYSIOLOGY MAPPING AND ABLATION      CARDIAC ELECTROPHYSIOLOGY PROCEDURE N/A 5/4/2022    Procedure: Cardiac Loop Recorder Explant/Implant;  Surgeon: Demetri Pryor MD;  Location[facility];  Service: Cardiology    CARDIAC ELECTROPHYSIOLOGY STUDY AND ABLATION      CARDIAC LOOP RECORDER Bilateral     COLONOSCOPY      ORCHIECTOMY Left 2006    VASECTOMY         FAMILY HISTORY:  Family History   Problem Relation Age of Onset    Lung cancer Mother     Colon cancer Mother        SOCIAL HISTORY:  Social History     Tobacco Use    Smoking status: Former    Smokeless tobacco: Never    Tobacco comments:     quit in 20's   Vaping Use    Vaping status: Never Used   Substance Use Topics    Alcohol use: Yes     Comment: beer daily    Drug use: No       MEDICATIONS:    Current Outpatient Medications:     albuterol (PROVENTIL HFA,VENTOLIN HFA) 90 mcg/act inhaler, Inhale 2 puffs every 6 (six) hours as needed  , Disp: , Rfl:     fluticasone (FLONASE) 50 mcg/act nasal spray, SPRAY 2 SPRAYS INTO EACH NOSTRIL EVERY DAY, Disp: , Rfl:     metoprolol succinate (TOPROL-XL) 100 mg 24 hr tablet, Take 1 tablet (100 mg total) by mouth daily, Disp: 90 tablet, Rfl: 3    vitamin E, tocopherol, 400 units capsule, Take 400 Units by mouth daily, Disp: , Rfl:     aspirin 81 MG tablet, Take 81 mg by mouth daily (Patient not taking: Reported on 10/3/2023), Disp: , Rfl:     loratadine (CLARITIN) 10 mg tablet, Take 10 mg by mouth as needed  (Patient not taking: Reported on 8/2/2023), Disp: , Rfl:     tadalafil (CIALIS) 20  "MG tablet, Take 1 tablet (20 mg total) by mouth daily as needed for erectile dysfunction (Patient not taking: Reported on 1/31/2024), Disp: 30 tablet, Rfl: 3    testosterone (ANDROGEL) 1%, Apply 1 packet (50 mg total) topically daily (Patient not taking: Reported on 1/31/2024), Disp: 30 packet, Rfl: 5    ALLERGIES:  Allergies   Allergen Reactions    Atorvastatin Other (See Comments)     Urinary retention       REVIEW OF SYSTEMS:  Review of Systems   Constitutional:  Negative for chills, fever and unexpected weight change.   HENT:  Negative for hearing loss, nosebleeds and sore throat.    Eyes:  Negative for pain, redness and visual disturbance.   Respiratory:  Negative for cough, shortness of breath and wheezing.    Cardiovascular:  Negative for chest pain, palpitations and leg swelling.   Gastrointestinal:  Negative for abdominal pain, nausea and vomiting.   Endocrine: Negative for polydipsia and polyuria.   Genitourinary:  Negative for dysuria and hematuria.   Musculoskeletal:  Positive for arthralgias.   Skin:  Negative for rash and wound.   Neurological:  Negative for dizziness, light-headedness and headaches.   Psychiatric/Behavioral:  Negative for decreased concentration, dysphoric mood and suicidal ideas. The patient is not nervous/anxious.        VITALS:  Vitals:    02/07/24 0829   BP: 163/71   Pulse: 66       LABS:  HgA1c: No results found for: \"HGBA1C\"  BMP:   Lab Results   Component Value Date    CALCIUM 9.1 10/24/2022    K 3.8 10/24/2022    CO2 20 (L) 10/24/2022     (H) 10/24/2022    BUN 21 10/24/2022    CREATININE 1.09 10/24/2022       _____________________________________________________  PHYSICAL EXAMINATION:  General: well developed and well nourished, alert, oriented times 3, and appears comfortable  Psychiatric: Normal  HEENT: Normocephalic, Atraumatic Trachea Midline, No torticollis  Pulmonary: No audible wheezing or respiratory distress   Cardiovascular: No pitting edema, 2+ radial pulse " "  Abdominal/GI: abdomen non tender, non distended   Skin: No masses, erythema, lacerations, fluctation, ulcerations  Neurovascular: Sensation Intact to the Median, Ulnar, Radial Nerve, Motor Intact to the Median, Ulnar, Radial Nerve, and Pulses Intact  Musculoskeletal: Normal, except as noted in detailed exam and in HPI.      MUSCULOSKELETAL EXAMINATION:  Left CMC Exam:  No adduction contracture  No hyperextension deformity of MCP joint  Positive localized tenderness over radial and dorsal aspect of thumb (CMC joint)  Grind test is Positive for pain and Positive for crepitus  Metacarpal load shift test positive   No triggering or tenderness over the A1 pulley     Right long finger:   No erythema or ecchymosis   Mild MP joint edema  Dorsal MP joint is tender to palpation   Full extension   Full composite fist   Brisk capillary refill     ___________________________________________________  STUDIES REVIEWED:  No images reviewed at today's visit        PROCEDURES PERFORMED:  Small joint arthrocentesis: L thumb CMC  [place] Protocol:  Consent: Verbal consent obtained.  Risks and benefits: risks, benefits and alternatives were discussed  Consent given by: patient  Time out: Immediately prior to procedure a \"time out\" was called to verify the correct patient, procedure, equipment, support staff and site/side marked as required.  Timeout called at: 2/7/2024 8:38 AM.  Patient understanding: patient states understanding of the procedure being performed  Site marked: the operative site was marked  Patient identity confirmed: verbally with patient  Supporting Documentation  Indications: pain   Procedure Details  Location: thumb - L thumb CMC  Preparation: Patient was prepped and draped in the usual sterile fashion  Needle size: 25 G  Ultrasound guidance: no  Approach: volar  Medications administered: 1 mL lidocaine 1 %; 20 mg triamcinolone acetonide 40 mg/mL    Patient tolerance: patient tolerated the procedure well with no " "immediate complications  Dressing:  Sterile dressing applied      Small joint arthrocentesis: R long MCP  Universal Protocol:  Consent: Verbal consent obtained.  Risks and benefits: risks, benefits and alternatives were discussed  Consent given by: patient  Time out: Immediately prior to procedure a \"time out\" was called to verify the correct patient, procedure, equipment, support staff and site/side marked as required.  Timeout called at: 2/7/2024 8:38 AM.  Patient understanding: patient states understanding of the procedure being performed  Site marked: the operative site was marked  Patient identity confirmed: verbally with patient  Supporting Documentation  Indications: pain   Procedure Details  Location: long finger - R long MCP  Preparation: Patient was prepped and draped in the usual sterile fashion  Needle size: 25 G  Ultrasound guidance: no  Approach: volar  Medications administered: 1 mL lidocaine 1 %; 20 mg triamcinolone acetonide 40 mg/mL    Patient tolerance: patient tolerated the procedure well with no immediate complications  Dressing:  Sterile dressing applied        _____________________________________________________      Scribe Attestation      I,:  Guera Jauregui am acting as a scribe while in the presence of the attending physician.:       I,:  Valentín Morgan MD personally performed the services described in this documentation    as scribed in my presence.:                  "

## 2024-02-15 ENCOUNTER — TELEPHONE (OUTPATIENT)
Dept: CARDIOLOGY CLINIC | Facility: CLINIC | Age: 74
End: 2024-02-15

## 2024-02-15 ENCOUNTER — HOSPITAL ENCOUNTER (OUTPATIENT)
Dept: NON INVASIVE DIAGNOSTICS | Facility: CLINIC | Age: 74
Discharge: HOME | End: 2024-02-15
Payer: MEDICARE

## 2024-02-15 VITALS
BODY MASS INDEX: 27.28 KG/M2 | WEIGHT: 180 LBS | HEIGHT: 68 IN | SYSTOLIC BLOOD PRESSURE: 163 MMHG | DIASTOLIC BLOOD PRESSURE: 71 MMHG | HEART RATE: 70 BPM

## 2024-02-15 DIAGNOSIS — I47.29 NSVT (NONSUSTAINED VENTRICULAR TACHYCARDIA) (HCC): ICD-10-CM

## 2024-02-15 DIAGNOSIS — I11.9 HYPERTENSIVE HEART DISEASE WITHOUT HEART FAILURE: ICD-10-CM

## 2024-02-15 LAB
AORTIC ROOT: 2.9 CM
AORTIC VALVE MEAN VELOCITY: 10.8 M/S
APICAL FOUR CHAMBER EJECTION FRACTION: 62 %
ASCENDING AORTA: 3.8 CM
AV AREA BY CONTINUOUS VTI: 2.7 CM2
AV AREA PEAK VELOCITY: 2.6 CM2
AV LVOT MEAN GRADIENT: 2 MMHG
AV LVOT PEAK GRADIENT: 5 MMHG
AV MEAN GRADIENT: 5 MMHG
AV PEAK GRADIENT: 8 MMHG
AV VALVE AREA: 2.7 CM2
AV VELOCITY RATIO: 0.74
CHEST PAIN STATEMENT: NORMAL
DOP CALC AO PEAK VEL: 1.44 M/S
DOP CALC AO VTI: 27.9 CM
DOP CALC LVOT AREA: 3.46 CM2
DOP CALC LVOT CARDIAC INDEX: 2.66 L/MIN/M2
DOP CALC LVOT CARDIAC OUTPUT: 5.19 L/MIN
DOP CALC LVOT DIAMETER: 2.1 CM
DOP CALC LVOT PEAK VEL VTI: 21.75 CM
DOP CALC LVOT PEAK VEL: 1.07 M/S
DOP CALC LVOT STROKE INDEX: 37.9 ML/M2
DOP CALC LVOT STROKE VOLUME: 75.3
E WAVE DECELERATION TIME: 204 MS
E/A RATIO: 1.4
FRACTIONAL SHORTENING: 35 (ref 28–44)
INTERVENTRICULAR SEPTUM IN DIASTOLE (PARASTERNAL SHORT AXIS VIEW): 1.1 CM
INTERVENTRICULAR SEPTUM: 1.1 CM (ref 0.6–1.1)
LAAS-AP2: 12.7 CM2
LAAS-AP4: 15.9 CM2
LEFT ATRIUM SIZE: 3.8 CM
LEFT ATRIUM VOLUME (MOD BIPLANE): 35 ML
LEFT ATRIUM VOLUME INDEX (MOD BIPLANE): 17.9 ML/M2
LEFT INTERNAL DIMENSION IN SYSTOLE: 2.6 CM (ref 2.1–4)
LEFT VENTRICULAR INTERNAL DIMENSION IN DIASTOLE: 4 CM (ref 3.5–6)
LEFT VENTRICULAR POSTERIOR WALL IN END DIASTOLE: 1.1 CM
LEFT VENTRICULAR STROKE VOLUME: 45 ML
LVSV (TEICH): 45 ML
MAX DIASTOLIC BP: 64 MMHG
MAX HR PERCENT: 68 %
MAX HR: 101 BPM
MAX PREDICTED HEART RATE: 147 BPM
MV E'TISSUE VEL-LAT: 11 CM/S
MV E'TISSUE VEL-SEP: 6 CM/S
MV PEAK A VEL: 0.53 M/S
MV PEAK E VEL: 74 CM/S
MV STENOSIS PRESSURE HALF TIME: 59 MS
MV VALVE AREA P 1/2 METHOD: 3.73
NUC STRESS EJECTION FRACTION: 65 %
PROTOCOL NAME: NORMAL
RATE PRESSURE PRODUCT: NORMAL
REASON FOR TERMINATION: NORMAL
RIGHT ATRIUM AREA SYSTOLE A4C: 18.1 CM2
RIGHT VENTRICLE ID DIMENSION: 4 CM
SINOTUBULAR JUNCTION: 3 CM
SL CV LEFT ATRIUM LENGTH A2C: 4.6 CM
SL CV LV EF: 55
SL CV PED ECHO LEFT VENTRICLE DIASTOLIC VOLUME (MOD BIPLANE) 2D: 69 ML
SL CV PED ECHO LEFT VENTRICLE SYSTOLIC VOLUME (MOD BIPLANE) 2D: 25 ML
SL CV REST NUCLEAR ISOTOPE DOSE: 10.49 MCI
SL CV SINUS OF VALSALVA 2D: 3.4 CM
SL CV STRESS NUCLEAR ISOTOPE DOSE: 30.7 MCI
SL CV STRESS RECOVERY BP: NORMAL MMHG
SL CV STRESS RECOVERY HR: 73 BPM
SL CV STRESS RECOVERY O2 SAT: 98 %
STJ: 3 CM
STRESS ANGINA INDEX: 0
STRESS BASELINE BP: NORMAL MMHG
STRESS BASELINE HR: 60 BPM
STRESS O2 SAT REST: 97 %
STRESS PEAK HR: 101 BPM
STRESS POST EXERCISE DUR MIN: 3 MIN
STRESS POST EXERCISE DUR SEC: 0 SEC
STRESS POST O2 SAT PEAK: 97 %
STRESS POST PEAK BP: 140 MMHG
STRESS POST PEAK HR: 101 BPM
STRESS POST PEAK SYSTOLIC BP: 142 MMHG
STRESS/REST PERFUSION RATIO: 0.99
TARGET HR FORMULA: NORMAL
TEST INDICATION: NORMAL
TR MAX PG: 23 MMHG
TR PEAK VELOCITY: 2.4 M/S
TRICUSPID ANNULAR PLANE SYSTOLIC EXCURSION: 2.3 CM
TRICUSPID VALVE PEAK REGURGITATION VELOCITY: 2.39 M/S

## 2024-02-15 PROCEDURE — 93016 CV STRESS TEST SUPVJ ONLY: CPT | Performed by: INTERNAL MEDICINE

## 2024-02-15 PROCEDURE — 93306 TTE W/DOPPLER COMPLETE: CPT | Performed by: INTERNAL MEDICINE

## 2024-02-15 PROCEDURE — 78452 HT MUSCLE IMAGE SPECT MULT: CPT | Performed by: INTERNAL MEDICINE

## 2024-02-15 PROCEDURE — G1004 CDSM NDSC: HCPCS

## 2024-02-15 PROCEDURE — 93017 CV STRESS TEST TRACING ONLY: CPT

## 2024-02-15 PROCEDURE — A9502 TC99M TETROFOSMIN: HCPCS

## 2024-02-15 PROCEDURE — 93306 TTE W/DOPPLER COMPLETE: CPT

## 2024-02-15 PROCEDURE — 93018 CV STRESS TEST I&R ONLY: CPT | Performed by: INTERNAL MEDICINE

## 2024-02-15 PROCEDURE — 78452 HT MUSCLE IMAGE SPECT MULT: CPT

## 2024-02-15 RX ORDER — REGADENOSON 0.08 MG/ML
0.4 INJECTION, SOLUTION INTRAVENOUS ONCE
Status: COMPLETED | OUTPATIENT
Start: 2024-02-15 | End: 2024-02-15

## 2024-02-15 RX ADMIN — REGADENOSON 0.4 MG: 0.08 INJECTION, SOLUTION INTRAVENOUS at 12:50

## 2024-02-15 NOTE — TELEPHONE ENCOUNTER
----- Message from Chava Goyal MD sent at 2/15/2024  1:59 PM EST -----  Let Mr Wilder know echo was normal

## 2024-02-19 ENCOUNTER — TELEPHONE (OUTPATIENT)
Dept: CARDIOLOGY CLINIC | Facility: CLINIC | Age: 74
End: 2024-02-19

## 2024-02-19 DIAGNOSIS — I48.3 TYPICAL ATRIAL FLUTTER (HCC): ICD-10-CM

## 2024-02-19 RX ORDER — METOPROLOL SUCCINATE 100 MG/1
100 TABLET, EXTENDED RELEASE ORAL DAILY
Qty: 90 TABLET | Refills: 3 | Status: SHIPPED | OUTPATIENT
Start: 2024-02-19

## 2024-04-24 ENCOUNTER — REMOTE DEVICE CLINIC VISIT (OUTPATIENT)
Dept: CARDIOLOGY CLINIC | Facility: CLINIC | Age: 74
End: 2024-04-24
Payer: MEDICARE

## 2024-04-24 DIAGNOSIS — Z95.818 PRESENCE OF OTHER CARDIAC IMPLANTS AND GRAFTS: Primary | ICD-10-CM

## 2024-04-24 PROCEDURE — 93298 REM INTERROG DEV EVAL SCRMS: CPT | Performed by: INTERNAL MEDICINE

## 2024-04-24 NOTE — PROGRESS NOTES
"MDT [id number]/ ACTIVE SYSTEM IS MRI CONDITIONAL   CARELINK TRANSMISSION: LOOP RECORDER. PRESENTING RHYTHM NSR W/ PVC @ 60 BPM. BATTERY STATUS \"OK.\" NO PATIENT OR DEVICE ACTIVATED EPISODES. NORMAL DEVICE FUNCTION. D   "

## 2024-05-07 ENCOUNTER — OFFICE VISIT (OUTPATIENT)
Dept: CARDIOLOGY CLINIC | Facility: CLINIC | Age: 74
End: 2024-05-07
Payer: MEDICARE

## 2024-05-07 VITALS
DIASTOLIC BLOOD PRESSURE: 60 MMHG | WEIGHT: 177.7 LBS | BODY MASS INDEX: 26.93 KG/M2 | HEIGHT: 68 IN | SYSTOLIC BLOOD PRESSURE: 132 MMHG | HEART RATE: 62 BPM

## 2024-05-07 DIAGNOSIS — I48.3 TYPICAL ATRIAL FLUTTER (HCC): Primary | ICD-10-CM

## 2024-05-07 DIAGNOSIS — I47.29 NSVT (NONSUSTAINED VENTRICULAR TACHYCARDIA) (HCC): ICD-10-CM

## 2024-05-07 DIAGNOSIS — I49.3 PVCS (PREMATURE VENTRICULAR CONTRACTIONS): ICD-10-CM

## 2024-05-07 DIAGNOSIS — I45.10 RBBB: ICD-10-CM

## 2024-05-07 PROCEDURE — 93000 ELECTROCARDIOGRAM COMPLETE: CPT | Performed by: INTERNAL MEDICINE

## 2024-05-07 PROCEDURE — 99213 OFFICE O/P EST LOW 20 MIN: CPT | Performed by: INTERNAL MEDICINE

## 2024-05-07 NOTE — PROGRESS NOTES
General Cardiology - Outpatient Progress Note   Maurice Wilder 74 y.o. male   MRN: [id number]  @ Encounter: [id number]    Kendall Arita MD  Consults        Interval History:   Mr. Wilder is here for a follow up visit and to discuss about his recent tests that were done as a work up for monomorphic VT noted in loop recorder in January 2024  (10 seconds, 188 bpm). Since last encounter, he denies lightheadedness, dizziness, syncope, headache, vision changes, diaphoresis, chest pain, palpitations, shortness of breath, PND, orthopnea, nausea, vomiting, abdominal pain or lower extremity edema. He had his nuclear stress test and echocardiogram which show no structural heart disease or ischemic heart disease.     Cardiac History Summary:     Maurice Wilder is a 73 year old male with a history of typical atrial flutter S/P CTI ablation in 2018, 1 episode of atrial fibrillation noted in July 2021 noted in loop recorder, s/p explant and reimplant of MDT loop recorder in May 2022.      His atrial flutter was an incidental finding, noted during a routine physical examination by the PCP, with a heart rate of 120. His EF was low normal at 53% and mild RV dilatation and LUBA with no valvular disease.  He was started on Toprol- mg daily and Eliquis.  His [id number] is 2 (age and hypertension)     He was noted to have monomorphic VT in January 2024, lasting 10 seconds at a rate of 188 bpm that occurred at 5:21 AM.  he had been asymptomatic, however he was likely sleeping.       Objective:  Review of Systems   Constitutional: Negative.  Negative for appetite change, diaphoresis, fatigue and fever.   HENT: Negative.     Eyes: Negative.    Respiratory: Negative.  Negative for cough, chest tightness and shortness of breath.    Cardiovascular: Negative.  Negative for chest pain, palpitations and leg swelling.   Gastrointestinal: Negative.  Negative for constipation, diarrhea and nausea.   Endocrine: Negative.    Genitourinary:  Negative.    Neurological: Negative.  Negative for dizziness, syncope and light-headedness.   Psychiatric/Behavioral: Negative.  Negative for hallucinations.    All other systems reviewed and are negative.    Review of system was conducted and was negative except for as stated in the interval history      Physical Exam:  Vitals: There were no vitals taken for this visit., There is no height or weight on file to calculate BMI.    GEN: Maurice Wilder appears well, alert and oriented x 3, pleasant and cooperative   HEENT:  Normocephalic, atraumatic, anicteric, moist mucous membranes  NECK:  no JVD or carotid bruits   HEART: regular rhythm, normal rate, normal S1 and S2, no murmurs, clicks, gallops or rubs   LUNGS: Clear to auscultation bilaterally; no wheezes, rales, or rhonchi; respiration nonlabored   ABDOMEN:  Normoactive bowel sounds, soft, no tenderness, no distention  EXTREMITIES: radial pulses palpable; no edema  NEURO: no gross focal findings; cranial nerves grossly intact   SKIN:  Dry, intact, warm to touch    Home Medications: (Not in a hospital admission)      Current Outpatient Medications:     albuterol (PROVENTIL HFA,VENTOLIN HFA) 90 mcg/act inhaler, Inhale 2 puffs every 6 (six) hours as needed  , Disp: , Rfl:     aspirin 81 MG tablet, Take 81 mg by mouth daily (Patient not taking: Reported on 10/3/2023), Disp: , Rfl:     fluticasone (FLONASE) 50 mcg/act nasal spray, SPRAY 2 SPRAYS INTO EACH NOSTRIL EVERY DAY, Disp: , Rfl:     loratadine (CLARITIN) 10 mg tablet, Take 10 mg by mouth as needed  (Patient not taking: Reported on 8/2/2023), Disp: , Rfl:     metoprolol succinate (TOPROL-XL) 100 mg 24 hr tablet, Take 1 tablet (100 mg total) by mouth daily, Disp: 90 tablet, Rfl: 3    tadalafil (CIALIS) 20 MG tablet, Take 1 tablet (20 mg total) by mouth daily as needed for erectile dysfunction (Patient not taking: Reported on 1/31/2024), Disp: 30 tablet, Rfl: 3    testosterone (ANDROGEL) 1%, Apply 1 packet (50 mg  "total) topically daily (Patient not taking: Reported on 1/31/2024), Disp: 30 packet, Rfl: 5    vitamin E, tocopherol, 400 units capsule, Take 400 Units by mouth daily, Disp: , Rfl:     Labs & Results:  No results found for: \"HSTNI0\", \"HSTNI2\", \"HSTNI4\", \"HSTNI\"  Lab Results   Component Value Date    NTBNP 319 (H) 10/17/2018     Lab Results   Component Value Date    TRIG 226 (H) 10/24/2022    HDL 36 (L) 10/24/2022    LDLCALC 95 10/24/2022     Lab Results   Component Value Date    K 3.8 10/24/2022    CO2 20 (L) 10/24/2022     (H) 10/24/2022    BUN 21 10/24/2022    CREATININE 1.09 10/24/2022    ALT 28 10/24/2022    AST 14 10/24/2022     Lab Results   Component Value Date    WBC 6.28 08/18/2023    HGB 15.7 08/18/2023    HCT 46.6 08/18/2023    MCV 90 08/18/2023     08/18/2023     Lab Results   Component Value Date    INR 1.08 11/08/2018    INR 1.02 10/17/2018       EKG:  Date: 5/7/2024  Interpretation: sinus rhythm, RBBB        HOLTER  No results found for this or any previous visit.    No results found for this or any previous visit.        Imaging: I have personally reviewed pertinent reports.        Previous STRESS TEST:  No results found for this or any previous visit.     No results found for this or any previous visit.    Results for orders placed during the hospital encounter of 02/15/24    NM myocardial perfusion spect (rx stress and/or rest)    Interpretation Summary    Stress ECG: No ST deviation is noted. The ECG was not diagnostic due to pharmacological (vasodilator) stress.    Perfusion: There are no perfusion defects.    Stress Function: Left ventricular function post-stress is normal. Stress ejection fraction is greater than  65%.    Stress Combined Conclusion: The ECG and SPECT imaging portions of the stress study are concordant with no evidence of stress induced myocardial ischemia. Left ventricular perfusion is normal.    Resting ECG: The ECG shows normal sinus rhythm.      Previous " Cath/PCI:  No results found for this or any previous visit.    No results found for this or any previous visit.    No results found for this or any previous visit.      ECHO:  Results for orders placed during the hospital encounter of 10/17/18    Echo complete with contrast if indicated    [facility]  [address]  [place] [address]  [phone]    Transthoracic Echocardiogram  2D, M-mode, Doppler, and Color Doppler    Study date:  18-Oct-2018    Patient: LANDRY ANAND  MR number: [id number]  Account number: [id number]  : 1950  Age: 68 years  Gender: Male  Status: Outpatient  Location: Bedside  Height: 68 in  Weight: 179.5 lb  BP: 125/ 83 mmHg    Indications: Atrial Flutter    Diagnoses: I48.1 - Atrial flutter    Sonographer:  Lissette Mason RDCS  Primary Physician:  Papi Cornejo MD  Referring Physician:  Spencer Alas PA-C  Group:  [facility]  Interpreting Physician:  Jeovany Hernandez MD    SUMMARY    LEFT VENTRICLE:  Systolic function was at the lower limits of normal. Ejection fraction was estimated to be 53 %.  There were no regional wall motion abnormalities.    RIGHT VENTRICLE:  The ventricle was mildly dilated.  Systolic function was normal.    RIGHT ATRIUM:  The atrium was mildly dilated.    HISTORY: PRIOR HISTORY: hypertension    PROCEDURE: The procedure was performed at the bedside. This was a routine study. The transthoracic approach was used. The study included complete 2D imaging, M-mode, complete spectral Doppler, and color Doppler. The heart rate was 116 bpm,  at the start of the study. Images were obtained from the parasternal, apical, subcostal, and suprasternal notch acoustic windows. Image quality was adequate.    LEFT VENTRICLE: Size was normal. Systolic function was at the lower limits of normal. Ejection fraction was estimated to be 53 %. There were no regional wall motion abnormalities. Wall thickness was normal. DOPPLER:  Transmitral flow  pattern: atrial fibrillation.    RIGHT VENTRICLE: The ventricle was mildly dilated. Systolic function was normal. Wall thickness was normal.    LEFT ATRIUM: Size was normal.    RIGHT ATRIUM: The atrium was mildly dilated.    MITRAL VALVE: Valve structure was normal. There was normal leaflet separation. DOPPLER: The transmitral velocity was within the normal range. There was no evidence for stenosis. There was no significant regurgitation.    AORTIC VALVE: The valve was trileaflet. Leaflets exhibited normal thickness and normal cuspal separation. DOPPLER: Transaortic velocity was within the normal range. There was no evidence for stenosis. There was no significant  regurgitation.    TRICUSPID VALVE: The valve structure was normal. There was normal leaflet separation. DOPPLER: The transtricuspid velocity was within the normal range. There was no evidence for stenosis. There was no significant regurgitation.    PULMONIC VALVE: Leaflets exhibited normal thickness, no calcification, and normal cuspal separation. DOPPLER: The transpulmonic velocity was within the normal range. There was no significant regurgitation.    PERICARDIUM: There was no pericardial effusion. The pericardium was normal in appearance.    AORTA: The root exhibited normal size.    SYSTEMIC VEINS: IVC: The inferior vena cava was normal in size.    SYSTEM MEASUREMENT TABLES    2D  %FS: 28.57 %  AV Diam: 3.14 cm  EDV(Teich): 82.2 ml  EF(Teich): 55.38 %  ESV(Teich): 36.68 ml  IVSd: 0.89 cm  LA Area: 17.04 cm2  LA Diam: 3.83 cm  LVEDV MOD A4C: 72.9 ml  LVEF MOD A4C: 62.23 %  LVESV MOD A4C: 27.53 ml  LVIDd: 4.28 cm  LVIDs: 3.06 cm  LVLd A4C: 7.12 cm  LVLs A4C: 5.71 cm  LVPWd: 0.9 cm  RA Area: 21.54 cm2  RVIDd: 4.02 cm  SV MOD A4C: 45.37 ml  SV(Teich): 45.53 ml    CW  TR MaxP.56 mmHg  TR Vmax: 2.15 m/s    MM  TAPSE: 2.08 cm    [employer] Commission Accredited Echocardiography Laboratory    Prepared and electronically signed  by    Jeovany Hernandez MD  Signed 18-Oct-2018 15:40:38    Results for orders placed during the hospital encounter of 02/15/24    Echo complete w/ contrast if indicated    Interpretation Summary    Left Ventricle: Left ventricular cavity size is normal. Wall thickness is normal. The left ventricular ejection fraction is 55%. Systolic function is normal. Wall motion is normal. Diastolic function is normal for age.      CAM:  Results for orders placed during the hospital encounter of 18    CAM    Narrative  [address] [id number] [phone]    Transesophageal Echocardiogram  2D, Doppler, and Color Doppler    Study date:  2018    Patient: LANDRY ANAND  MR number: [id number]  Account number: [id number]  : 1950  Age: 68 years  Gender: Male  Status: Outpatient  Location: Cath lab  Height: 68 in  Weight: 180 lb  BP: 132/ 98 mmHg    Indications: Atrial flutter w/ Ablation.    Diagnoses: I48.1 - Atrial flutter    Sonographer:  Jelly Valverde RDCS  Primary Physician:  Papi Cornejo MD  Referring Physician:  Chava Goyal MD  Group:  [facility]  Interpreting Physician:  Chava Goyal MD    SUMMARY    LEFT VENTRICLE:  Systolic function was normal. Ejection fraction was estimated in the range of 55 % to 65 %.    LEFT ATRIAL APPENDAGE:  The size was normal.  The function was normal (normal emptying velocity).  No thrombus was identified.    ATRIAL SEPTUM:  No defect or patent foramen ovale was identified.    MITRAL VALVE:  There was mild regurgitation.    AORTIC VALVE:  There was mild stenosis.    TRICUSPID VALVE:  There was mild regurgitation.    HISTORY: PRIOR HISTORY: RBBB. Hypertension.    PROCEDURE: The procedure was performed in the catheterization laboratory. This was a routine study. The risks and alternatives of the procedure were explained to the patient and informed consent was obtained. The transesophageal  approach  was used. The study included complete 2D imaging, limited spectral Doppler, color Doppler, and probe insertion (without interpretation). The heart rate was 137 bpm, at the start of the study. An adult omniplane probe was inserted by the  attending cardiologist. Intubated with ease. One intubation attempt(s). There was no blood detected on the probe. There were no complications during the procedure. MEDICATIONS: Anesthesia administered by anesthesia team.    LEFT VENTRICLE: Size was normal. Systolic function was normal. Ejection fraction was estimated in the range of 55 % to 65 %. Wall thickness was normal.    RIGHT VENTRICLE: The size was normal. Systolic function was normal. Wall thickness was normal.    LEFT ATRIUM: Size was normal. No thrombus was identified. APPENDAGE: The size was normal. No thrombus was identified. DOPPLER: The function was normal (normal emptying velocity).    ATRIAL SEPTUM: No defect or patent foramen ovale was identified.    MITRAL VALVE: Valve structure was normal. There was normal leaflet separation. There was no echocardiographic evidence of vegetation. DOPPLER: There was mild regurgitation.    AORTIC VALVE: The noncoronary cusp demonstrated mildly increased thickness, mild calcification, and mild nodularity. DOPPLER: There was mild stenosis.    TRICUSPID VALVE: The valve structure was normal. There was normal leaflet separation. There was no echocardiographic evidence of vegetation. DOPPLER: There was mild regurgitation.    PULMONIC VALVE: Leaflets exhibited normal thickness, no calcification, and normal cuspal separation. There was no echocardiographic evidence of vegetation.    PERICARDIUM: There was no pericardial effusion. The pericardium was normal in appearance.    AORTA: The root exhibited normal size. There was no atheroma. There was no evidence for dissection. There was no evidence for aneurysm.    [employer] Commission Accredited Echocardiography  Laboratory    Prepared and electronically signed by    Chava Goyal MD  Signed 08-Nov-2018 09:59:44    No results found for this or any previous visit.      CMR:  No results found for this or any previous visit.    No results found for this or any previous visit.    No results found for this or any previous visit.          Assessment/Plan     Assessment:    Monomorphic non sustained ventricular tachycardia  Noted on loop recorder 1/16/2024   188 bpm, lasting 10 seconds, 5:21 am  Asymptomatic/sleeping  Nuclear stress test: no perfusion defects, no scar  Echocardiogram: no structural heart disease  Currently on toprol xl 100 mg daily    PVCs  3.7% burden on loop recorder  patient is asymptomatic    [employer] loop recorder (explant/reimplant May 2022)    PAF  Post aflutter ablation  only one episode found on loop recorder lasting 1 hour 10 min 7/2/21  False positive episodes detected as well  No recent atrial fibrillaion  [id number]: 2 (age and HTN)  Not on AC    Typical atrial flutter   CTI ablation 2018 by Dr. Goyal  was asymptomatic with atrial flutter  No recurrence  Off anticoagulation    HTN   Currently controlled    RBBB        Plan:  Structural/ischemic heart disease has been ruled out. His NSVT episode is monomorphic and <200 bpm, relatively low risk, and likely idiopathic.   Would continue too monitor on loop recorder  Continue toprol xl 100 mg dialy  Monitoring off anticoagulation, given low CHADVASC, no aflutter or afib recurrence, will continue to monitor off AC.     Follow up in 1 year      Case discussed and reviewed with Dr. Goyal who agrees with my assessment and plan.      Garth Lugo MD  Cardiology Fellow   PGY-5    ==========================================================================================    Epic/ Allscripts/Care Everywhere records reviewed: yes    ** Please Note: Fluency DirectDictation voice to text software may have been used in the creation of this document. **

## 2024-07-31 ENCOUNTER — REMOTE DEVICE CLINIC VISIT (OUTPATIENT)
Dept: CARDIOLOGY CLINIC | Facility: CLINIC | Age: 74
End: 2024-07-31
Payer: MEDICARE

## 2024-07-31 DIAGNOSIS — I48.3 TYPICAL ATRIAL FLUTTER (HCC): Primary | ICD-10-CM

## 2024-07-31 DIAGNOSIS — I47.29 NSVT (NONSUSTAINED VENTRICULAR TACHYCARDIA) (HCC): ICD-10-CM

## 2024-07-31 DIAGNOSIS — I45.10 RBBB: ICD-10-CM

## 2024-07-31 DIAGNOSIS — I49.3 PVCS (PREMATURE VENTRICULAR CONTRACTIONS): ICD-10-CM

## 2024-07-31 PROCEDURE — 93298 REM INTERROG DEV EVAL SCRMS: CPT | Performed by: INTERNAL MEDICINE

## 2024-07-31 NOTE — PROGRESS NOTES
"MDT LNQ22/ ACTIVE SYSTEM IS MRI CONDITIONAL   CARELINK TRANSMISSION: LOOP RECORDER. PRESENTING RHYTHM NSR @ 60 BPM. BATTERY STATUS \"OK.\" NO PATIENT OR DEVICE ACTIVATED EPISODES. NORMAL DEVICE FUNCTION. DL   "

## 2024-10-30 ENCOUNTER — REMOTE DEVICE CLINIC VISIT (OUTPATIENT)
Dept: CARDIOLOGY CLINIC | Facility: CLINIC | Age: 74
End: 2024-10-30
Payer: MEDICARE

## 2024-10-30 DIAGNOSIS — R00.2 PALPITATIONS: Primary | ICD-10-CM

## 2024-10-30 DIAGNOSIS — I48.0 PAF (PAROXYSMAL ATRIAL FIBRILLATION) (HCC): ICD-10-CM

## 2024-10-30 PROCEDURE — 93298 REM INTERROG DEV EVAL SCRMS: CPT | Performed by: INTERNAL MEDICINE

## 2024-10-30 NOTE — PROGRESS NOTES
"MDT LNQ22/ ACTIVE SYSTEM IS MRI CONDITIONAL   CARELINK TRANSMISSION: LOOP RECORDER. PRESENTING RYTHM NSR W/ PVCs @ 86 BPM. BATTERY STATUS \"OK.\" NO PATIENT OR DEVICE ACTIVATED EPISODES. NORMAL DEVICE FUNCTION. DL   "

## 2025-01-29 ENCOUNTER — RESULTS FOLLOW-UP (OUTPATIENT)
Dept: CARDIOLOGY CLINIC | Facility: CLINIC | Age: 75
End: 2025-01-29

## 2025-02-14 ENCOUNTER — REMOTE DEVICE CLINIC VISIT (OUTPATIENT)
Dept: CARDIOLOGY CLINIC | Facility: CLINIC | Age: 75
End: 2025-02-14
Payer: MEDICARE

## 2025-02-14 ENCOUNTER — RESULTS FOLLOW-UP (OUTPATIENT)
Dept: CARDIOLOGY CLINIC | Facility: CLINIC | Age: 75
End: 2025-02-14

## 2025-02-14 DIAGNOSIS — I48.3 TYPICAL ATRIAL FLUTTER (HCC): ICD-10-CM

## 2025-02-14 DIAGNOSIS — R00.2 PALPITATIONS: Primary | ICD-10-CM

## 2025-02-14 PROCEDURE — 93298 REM INTERROG DEV EVAL SCRMS: CPT | Performed by: INTERNAL MEDICINE

## 2025-02-14 NOTE — PROGRESS NOTES
"MDT LNQ22/ ACTIVE SYSTEM IS MRI CONDITIONAL   CARELINK TRANSMISSION: LOOP RECORDER. PRESENTING RHYTHM NSR @ 72 BPM. BATTERY STATUS \"OK.\" NO PATIENT OR DEVICE ACTIVATED EPISODES. HOWEVER THERE IS AN EGRAM PREVIOUSLY ADDRESSED IN ALERT. NORMAL DEVICE FUNCTION. DL   "

## 2025-02-26 ENCOUNTER — APPOINTMENT (OUTPATIENT)
Age: 75
End: 2025-02-26
Payer: MEDICARE

## 2025-02-26 DIAGNOSIS — E78.5 HYPERLIPIDEMIA, UNSPECIFIED HYPERLIPIDEMIA TYPE: ICD-10-CM

## 2025-02-26 DIAGNOSIS — R73.03 DIABETES MELLITUS, LATENT: ICD-10-CM

## 2025-02-26 LAB
CHOLEST SERPL-MCNC: 117 MG/DL (ref ?–200)
CREAT UR-MCNC: 149.4 MG/DL
EST. AVERAGE GLUCOSE BLD GHB EST-MCNC: 140 MG/DL
HBA1C MFR BLD: 6.5 %
HDLC SERPL-MCNC: 37 MG/DL
LDLC SERPL CALC-MCNC: 57 MG/DL (ref 0–100)
MICROALBUMIN UR-MCNC: <7 MG/L
NONHDLC SERPL-MCNC: 80 MG/DL
TRIGL SERPL-MCNC: 113 MG/DL (ref ?–150)

## 2025-02-26 PROCEDURE — 82570 ASSAY OF URINE CREATININE: CPT

## 2025-02-26 PROCEDURE — 36415 COLL VENOUS BLD VENIPUNCTURE: CPT

## 2025-02-26 PROCEDURE — 80061 LIPID PANEL: CPT

## 2025-02-26 PROCEDURE — 83036 HEMOGLOBIN GLYCOSYLATED A1C: CPT

## 2025-02-26 PROCEDURE — 82043 UR ALBUMIN QUANTITATIVE: CPT

## 2025-03-06 DIAGNOSIS — I48.3 TYPICAL ATRIAL FLUTTER (HCC): ICD-10-CM

## 2025-03-06 RX ORDER — METOPROLOL SUCCINATE 100 MG/1
100 TABLET, EXTENDED RELEASE ORAL DAILY
Qty: 90 TABLET | Refills: 1 | Status: SHIPPED | OUTPATIENT
Start: 2025-03-06

## 2025-03-06 NOTE — TELEPHONE ENCOUNTER
Reason for call:   [x] Refill   [] Prior Auth  [] Other:     Office:   [] PCP/Provider -   [x] Specialty/Provider - Rina Cole PA-C / [facility]     Medication: metoprolol succinate (TOPROL-XL) 100 mg 24 hr tablet / Take 1 tablet (100 mg total) by mouth daily    Pharmacy: [facility] [address] [place] [address] Pharmacy   Does the patient have enough for 3 days?   [] Yes   [x] No - Send as HP to POD

## 2025-04-14 ENCOUNTER — TELEPHONE (OUTPATIENT)
Dept: CARDIOLOGY CLINIC | Facility: CLINIC | Age: 75
End: 2025-04-14

## 2025-04-14 NOTE — TELEPHONE ENCOUNTER
Warm transfer from RN Pod - spoke with patient regarding upcoming MRI scheduled 418/25 & loop function post MRI. No contraindications with MRI/Loop as previously advised by MRI facility. Device will remain active & auto nightly checks will continue. Alert will be sent to office of any malfunctioning of device.     Verbal understanding.

## 2025-04-22 ENCOUNTER — RESULTS FOLLOW-UP (OUTPATIENT)
Dept: CARDIOLOGY CLINIC | Facility: CLINIC | Age: 75
End: 2025-04-22

## 2025-04-22 ENCOUNTER — REMOTE DEVICE CLINIC VISIT (OUTPATIENT)
Dept: CARDIOLOGY CLINIC | Facility: CLINIC | Age: 75
End: 2025-04-22
Payer: MEDICARE

## 2025-04-22 DIAGNOSIS — I47.9 PAROXYSMAL TACHYCARDIA (HCC): ICD-10-CM

## 2025-04-22 DIAGNOSIS — R00.2 PALPITATIONS: Primary | ICD-10-CM

## 2025-04-22 PROCEDURE — 93298 REM INTERROG DEV EVAL SCRMS: CPT | Performed by: INTERNAL MEDICINE

## 2025-04-22 NOTE — PROGRESS NOTES
"MDT LNQ22/ ACTIVE SYSTEM IS MRI CONDITIONAL   CARELINK TRANSMISSION: LOOP RECORDER. PRESENTING RHYTHM NSR @ 60 BPM. BATTERY STATUS \"OK.\"  NO PATIENT OR DEVICE ACTIVATED EPISODES. HOWEVER THERE IS AN EGRAM PREVIOUSLY ADDRESSED IN ALERT. NORMAL DEVICE FUNCTION. DL   "

## 2025-05-08 ENCOUNTER — OFFICE VISIT (OUTPATIENT)
Dept: CARDIOLOGY CLINIC | Facility: CLINIC | Age: 75
End: 2025-05-08
Payer: MEDICARE

## 2025-05-08 VITALS
BODY MASS INDEX: 27.41 KG/M2 | HEART RATE: 65 BPM | HEIGHT: 68 IN | WEIGHT: 180.9 LBS | DIASTOLIC BLOOD PRESSURE: 78 MMHG | SYSTOLIC BLOOD PRESSURE: 130 MMHG

## 2025-05-08 DIAGNOSIS — I47.29 NSVT (NONSUSTAINED VENTRICULAR TACHYCARDIA) (HCC): Primary | ICD-10-CM

## 2025-05-08 LAB
ATRIAL RATE: 65 BPM
P AXIS: 78 DEGREES
PR INTERVAL: 152 MS
QRS AXIS: 59 DEGREES
QRSD INTERVAL: 148 MS
QT INTERVAL: 426 MS
QTC INTERVAL: 443 MS
T WAVE AXIS: 65 DEGREES
VENTRICULAR RATE: 65 BPM

## 2025-05-08 PROCEDURE — 99214 OFFICE O/P EST MOD 30 MIN: CPT | Performed by: INTERNAL MEDICINE

## 2025-05-08 PROCEDURE — 93000 ELECTROCARDIOGRAM COMPLETE: CPT | Performed by: INTERNAL MEDICINE

## 2025-05-08 RX ORDER — ROSUVASTATIN CALCIUM 10 MG/1
10 TABLET, COATED ORAL EVERY EVENING
COMMUNITY

## 2025-05-08 RX ORDER — GABAPENTIN 300 MG/1
300 CAPSULE ORAL
COMMUNITY
Start: 2025-04-24

## 2025-05-08 NOTE — PROGRESS NOTES
HEART AND VASCULAR  CARDIAC ELECTROPHYSIOLOGY   HEART RHYTHM CENTER  [facility]    Outpatient f/u   Today's Date: 05/08/25        Patient name: Maurice Wilder  YOB: 1950  Sex: male         Chief Complaint: f/u Atrial flutter    ASSESSMENT:  Problem List Items Addressed This Visit          Cardiovascular and Mediastinum    NSVT (nonsustained ventricular tachycardia) (HCC) - Primary    Relevant Orders    POCT ECG       74 yo male  1) S/p successful ablation for Typical aflutter 2018. He had aflutter  w RVR found incidentally in routine physical at PCP. HR 120s, was asymptomatic, actually able to walk about  4 miles recently. No edemt, no cp, no palpitations. EF remains low normal 53%  Mild RV dilation and LUBA. No valve disease.  Started on TOprol XL 100mg and ELiquis. CHADS2Vasc=3 (Age, HTN  not on AC has loops and no afib , on second loop.  He is on metoprolol for HTN as well welll controlled  2) He does snore but no daytime fatigue or other symptoms of PRUDENCIO  Denies heavy ETOH use.  3) DYslipidemia on statin.   Loop is negative, reviewed strips    Normal nuclear stress and echo last year  PLAN:  1. Continue to observe on loop , when runs out of batteries replace  2.  Cont metop      Orders Placed This Encounter   Procedures    POCT ECG     Medications Discontinued During This Encounter   Medication Reason    aspirin 81 MG tablet Therapy completed    loratadine (CLARITIN) 10 mg tablet Therapy completed    testosterone (ANDROGEL) 1% Therapy completed    vitamin E, tocopherol, 400 units capsule Therapy completed         .............................................................................................    HPI/Subjective:   74 yo male  1) S/p successful ablation for Typical aflutter 2018. He had aflutter  w RVR found incidentally in routine physical at PCP. HR 120s, was asymptomatic, actually able to walk about  4 miles recently. No edemt, no cp, no palpitations. EF remains low  normal 53%  Mild RV dilation and LUBA. No valve disease.  Started on TOprol XL 100mg and ELiquis. CHADS2Vasc=3 (Age, HTN  not on AC has loops and no afib , on second loop.  He is on metoprolol for HTN as well welll controlled  2) He does snore but no daytime fatigue or other symptoms of PRUDENCIO  Denies heavy ETOH use.      Past Medical History:   Diagnosis Date    Arthritis     Asthma     seasonal allergy triggered    Colon polyp     Diverticulosis     Erectile dysfunction     [place] (hard of hearing)     Hyperlipidemia     Hypertension     Irregular heart beat     hx a flutter had an ablation in past, has loop recorder    Perceptive hearing loss, both sides     waers hearing aides bilateral    Seasonal allergies     Seasonal allergies     Spinal stenosis     lumbar       Allergies   Allergen Reactions    Atorvastatin Other (See Comments)     Urinary retention     I reviewed the Home Medication list and Allergies in the chart.   Scheduled Meds:  Current Outpatient Medications   Medication Sig Dispense Refill    albuterol (PROVENTIL HFA,VENTOLIN HFA) 90 mcg/act inhaler Inhale 2 puffs every 6 (six) hours as needed        fluticasone (FLONASE) 50 mcg/act nasal spray SPRAY 2 SPRAYS INTO EACH NOSTRIL EVERY DAY      gabapentin (NEURONTIN) 300 mg capsule Take 300 mg by mouth daily at bedtime      metoprolol succinate (TOPROL-XL) 100 mg 24 hr tablet Take 1 tablet (100 mg total) by mouth daily 90 tablet 1    rosuvastatin (CRESTOR) 10 MG tablet Take 10 mg by mouth every evening      tadalafil (CIALIS) 20 MG tablet Take 1 tablet (20 mg total) by mouth daily as needed for erectile dysfunction 30 tablet 3     No current facility-administered medications for this visit.     PRN Meds:.        Family History   Problem Relation Age of Onset    Lung cancer Mother     Colon cancer Mother        Social History     Socioeconomic History    Marital status: /Civil Union     Spouse name: Not on file    Number of children: Not on file     "Years of education: Not on file    Highest education level: Not on file   Occupational History    Not on file   Tobacco Use    Smoking status: Former    Smokeless tobacco: Never    Tobacco comments:     quit in 20's   Vaping Use    Vaping status: Never Used   Substance and Sexual Activity    Alcohol use: Yes     Alcohol/week: 1.0 standard drink of alcohol     Types: 1 Cans of beer per week    Drug use: No    Sexual activity: Not on file   Other Topics Concern    Not on file   Social History Narrative    Not on file     Social Drivers of Health     Financial Resource Strain: Not on file   Food Insecurity: Not on file   Transportation Needs: Not on file   Physical Activity: Not on file   Stress: Not on file   Social Connections: Not on file   Intimate Partner Violence: Not on file   Housing Stability: Not on file         OBJECTIVE:    /78 (BP Location: Right arm, Patient Position: Sitting, Cuff Size: Standard)   Pulse 65   Ht 5' 8\" (1.727 m)   Wt 82.1 kg (180 lb 14.4 oz)   BMI 27.51 kg/m²   Vitals:    05/08/25 1118   Weight: 82.1 kg (180 lb 14.4 oz)     /78 (BP Location: Right arm, Patient Position: Sitting, Cuff Size: Standard)   Pulse 65   Ht 5' 8\" (1.727 m)   Wt 82.1 kg (180 lb 14.4 oz)   BMI 27.51 kg/m²   Vitals:    05/08/25 1118   Weight: 82.1 kg (180 lb 14.4 oz)     /78 (BP Location: Right arm, Patient Position: Sitting, Cuff Size: Standard)   Pulse 65   Ht 5' 8\" (1.727 m)   Wt 82.1 kg (180 lb 14.4 oz)   BMI 27.51 kg/m²   Vitals:    05/08/25 1118   Weight: 82.1 kg (180 lb 14.4 oz)     GEN: No acute distress, Alert and oriented, well appearing  HEENT:External ears normal, wearing a mask.   EYES: Pupils equal, sclera anicteric, midline, normal conjuctiva  NECK: No JVD, supple, no obvious masses or thryomegaly or goiter  CARDIOVASCULAR:  RRR, No murmur, rub, gallops S1,S2  LUNGS: Clear To auscultation bilaterally, normal effort, no rales, rhonchi, crackles   ABDOMEN:  nondistended,  " "without obvious organomegaly or ascites  EXTREMITIES/VASCULAR:  No edema. warm an well perfused.  PSYCH: Normal Affect,  linear speech pattern without evidence of psychosis.   NEURO: Grossly intact, moving all extremiteis equal, face symmetric, alert and responsive, no obvious focal defecits   GAIT:  Ambulates normally without difficulty  HEME: No bleeding, bruising, petechia, purpura   SKIN: No significant rashes on visibile skin, warm, no diaphoresis or pallor.         Lab Results:       LABS:      Chemistry        Component Value Date/Time    K 4.5 11/25/2024 0953     11/25/2024 0953    CO2 23 11/25/2024 0953    BUN 19 11/25/2024 0953    CREATININE 1.05 11/25/2024 0953        Component Value Date/Time    CALCIUM 9.4 11/25/2024 0953    ALKPHOS 65 11/25/2024 0953    AST 23 11/25/2024 0953    ALT 39 11/25/2024 0953            No results found for: \"CHOL\"  Lab Results   Component Value Date    HDL 37 (L) 02/26/2025    HDL 36 (L) 10/24/2022    HDL 39 (L) 12/16/2019     Lab Results   Component Value Date    LDLCALC 57 02/26/2025    LDLCALC 95 10/24/2022    LDLCALC 57 12/16/2019     Lab Results   Component Value Date    TRIG 113 02/26/2025    TRIG 226 (H) 10/24/2022    TRIG 154 (H) 12/16/2019     No results found for: \"CHOLHDL\"    IMAGING: Xr Chest 1 View Portable    Result Date: 10/18/2018  Narrative: CHEST INDICATION:   sob. COMPARISON:  None EXAM PERFORMED/VIEWS:  XR CHEST PORTABLE FINDINGS: Cardiomediastinal silhouette appears unremarkable. The lungs are clear.  No pneumothorax or pleural effusion. Osseous structures appear within normal limits for patient age.     Impression: No acute cardiopulmonary disease. Workstation performed: [id number]        Cardiac testing:   Results for orders placed during the hospital encounter of 10/17/18   Echo complete with contrast if indicated    Narrative [facility]  [address]  [place] [address] [phone]    Transthoracic Echocardiogram  2D, " M-mode, Doppler, and Color Doppler    Study date:  18-Oct-2018    Patient: LANDRY ANAND  MR number: [id number]  Account number: [id number]  : 1950  Age: 68 years  Gender: Male  Status: Outpatient  Location: Bedside  Height: 68 in  Weight: 179.5 lb  BP: 125/ 83 mmHg    Indications: Atrial Flutter    Diagnoses: I48.1 - Atrial flutter    Sonographer:  Lissette Mason RDCS  Primary Physician:  Papi Cornejo MD  Referring Physician:  Spencer Alas PA-C  Group:  [facility]  Interpreting Physician:  Jeovany Hernandez MD    SUMMARY    LEFT VENTRICLE:  Systolic function was at the lower limits of normal. Ejection fraction was estimated to be 53 %.  There were no regional wall motion abnormalities.    RIGHT VENTRICLE:  The ventricle was mildly dilated.  Systolic function was normal.    RIGHT ATRIUM:  The atrium was mildly dilated.    HISTORY: PRIOR HISTORY: hypertension    PROCEDURE: The procedure was performed at the bedside. This was a routine study. The transthoracic approach was used. The study included complete 2D imaging, M-mode, complete spectral Doppler, and color Doppler. The heart rate was 116 bpm,  at the start of the study. Images were obtained from the parasternal, apical, subcostal, and suprasternal notch acoustic windows. Image quality was adequate.    LEFT VENTRICLE: Size was normal. Systolic function was at the lower limits of normal. Ejection fraction was estimated to be 53 %. There were no regional wall motion abnormalities. Wall thickness was normal. DOPPLER: Transmitral flow  pattern: atrial fibrillation.    RIGHT VENTRICLE: The ventricle was mildly dilated. Systolic function was normal. Wall thickness was normal.    LEFT ATRIUM: Size was normal.    RIGHT ATRIUM: The atrium was mildly dilated.    MITRAL VALVE: Valve structure was normal. There was normal leaflet separation. DOPPLER: The transmitral velocity was within the normal range. There was no evidence for stenosis.  There was no significant regurgitation.    AORTIC VALVE: The valve was trileaflet. Leaflets exhibited normal thickness and normal cuspal separation. DOPPLER: Transaortic velocity was within the normal range. There was no evidence for stenosis. There was no significant  regurgitation.    TRICUSPID VALVE: The valve structure was normal. There was normal leaflet separation. DOPPLER: The transtricuspid velocity was within the normal range. There was no evidence for stenosis. There was no significant regurgitation.    PULMONIC VALVE: Leaflets exhibited normal thickness, no calcification, and normal cuspal separation. DOPPLER: The transpulmonic velocity was within the normal range. There was no significant regurgitation.    PERICARDIUM: There was no pericardial effusion. The pericardium was normal in appearance.    AORTA: The root exhibited normal size.    SYSTEMIC VEINS: IVC: The inferior vena cava was normal in size.    SYSTEM MEASUREMENT TABLES    2D  %FS: 28.57 %  AV Diam: 3.14 cm  EDV(Teich): 82.2 ml  EF(Teich): 55.38 %  ESV(Teich): 36.68 ml  IVSd: 0.89 cm  LA Area: 17.04 cm2  LA Diam: 3.83 cm  LVEDV MOD A4C: 72.9 ml  LVEF MOD A4C: 62.23 %  LVESV MOD A4C: 27.53 ml  LVIDd: 4.28 cm  LVIDs: 3.06 cm  LVLd A4C: 7.12 cm  LVLs A4C: 5.71 cm  LVPWd: 0.9 cm  RA Area: 21.54 cm2  RVIDd: 4.02 cm  SV MOD A4C: 45.37 ml  SV(Teich): 45.53 ml    CW  TR MaxP.56 mmHg  TR Vmax: 2.15 m/s    MM  TAPSE: 2.08 cm    [facility] Commission Accredited Echocardiography Laboratory    Prepared and electronically signed by    Jeovany Hernandez MD  Signed 18-Oct-2018 15:40:38       No results found for this or any previous visit.  No results found for this or any previous visit.  No results found for this or any previous visit.        I reviewed and interpreted the following LABS/EKG/TELE/IMAGING and below is summary of my interpretation (if data available):    LABS:normal renal function    Current EKG and Rhythm Strip:NSR, RBBB  Past EKGs and  RHYTHM strip: Flutter w rvr 10/17/18 and rbbb

## 2025-07-01 ENCOUNTER — REMOTE DEVICE CLINIC VISIT (OUTPATIENT)
Dept: CARDIOLOGY CLINIC | Facility: CLINIC | Age: 75
End: 2025-07-01
Payer: MEDICARE

## 2025-07-01 DIAGNOSIS — I48.3 TYPICAL ATRIAL FLUTTER (HCC): Primary | ICD-10-CM

## 2025-07-01 DIAGNOSIS — I45.10 RBBB: ICD-10-CM

## 2025-07-01 PROCEDURE — 93298 REM INTERROG DEV EVAL SCRMS: CPT | Performed by: INTERNAL MEDICINE

## 2025-07-01 NOTE — PROGRESS NOTES
"MDT LNQ22/ ACTIVE SYSTEM IS MRI CONDITIONAL   CARELINK TRANSMISSION: LOOP RECORDER. PRESENTING RHYTHM NSR @ 86 BPM. BATTERY STATUS \"OK.\" NO PATIENT OR DEVICE ACTIVATED EPISODES. NORMAL DEVICE FUNCTION. DL   " Lab Results   Component Value Date    EGFR 78 01/05/2024    EGFR 84 01/04/2024    EGFR 84 01/03/2024    CREATININE 0.87 01/05/2024    CREATININE 0.72 01/04/2024    CREATININE 0.72 01/03/2024     Renal function stable at baseline